# Patient Record
Sex: FEMALE | Race: WHITE | Employment: OTHER | URBAN - METROPOLITAN AREA
[De-identification: names, ages, dates, MRNs, and addresses within clinical notes are randomized per-mention and may not be internally consistent; named-entity substitution may affect disease eponyms.]

---

## 2017-03-02 ENCOUNTER — OFFICE VISIT (OUTPATIENT)
Dept: CARDIOLOGY | Facility: CLINIC | Age: 82
End: 2017-03-02
Attending: INTERNAL MEDICINE
Payer: COMMERCIAL

## 2017-03-02 VITALS
OXYGEN SATURATION: 96 % | WEIGHT: 230.69 LBS | SYSTOLIC BLOOD PRESSURE: 167 MMHG | DIASTOLIC BLOOD PRESSURE: 74 MMHG | HEART RATE: 68 BPM | BODY MASS INDEX: 40.86 KG/M2

## 2017-03-02 DIAGNOSIS — I25.10 CORONARY ARTERY DISEASE INVOLVING NATIVE CORONARY ARTERY OF NATIVE HEART WITHOUT ANGINA PECTORIS: ICD-10-CM

## 2017-03-02 DIAGNOSIS — R07.89 ATYPICAL CHEST PAIN: ICD-10-CM

## 2017-03-02 PROCEDURE — 99213 OFFICE O/P EST LOW 20 MIN: CPT | Performed by: INTERNAL MEDICINE

## 2017-03-02 ASSESSMENT — PAIN SCALES - GENERAL: PAINLEVEL: NO PAIN (0)

## 2017-03-02 NOTE — PROGRESS NOTES
HPI and Plan:   See dictation    Orders Placed This Encounter   Procedures     Follow-Up with Cardiologist       No orders of the defined types were placed in this encounter.      There are no discontinued medications.      Encounter Diagnoses   Name Primary?     Coronary artery disease involving native coronary artery of native heart without angina pectoris      Atypical chest pain        CURRENT MEDICATIONS:  Current Outpatient Prescriptions   Medication Sig Dispense Refill     allopurinol (ZYLOPRIM) 100 MG tablet Take 1 tablet (100 mg) by mouth daily 90 tablet 3     levothyroxine (SYNTHROID/LEVOTHROID) 75 MCG tablet Take 1 tablet (75 mcg) by mouth daily 90 tablet 2     GLUCOSAMINE SULFATE PO        loperamide (IMODIUM A-D) 2 MG tablet Start with 2 tabs (4 mg), then take one tab (2 mg) after each diarrheal stool.  Do not use more than  8 tabs (16 mg) per day. 30 tablet 0     tiZANidine (ZANAFLEX) 2 MG tablet Take 1 tablet (2 mg) by mouth nightly as needed for muscle spasms 30 tablet 0     HYDROcodone-acetaminophen (NORCO) 5-325 MG per tablet Take 1 tablet by mouth 2 times daily as needed for moderate to severe pain Max of 2 a day 30 tablet 0     nystatin (MYCOSTATIN) 361955 UNIT/GM POWD Apply to area where you get a yeast infection daily 60 g 1     psyllium 0.52 G capsule Take 1 capsule (0.52 g) by mouth At Bedtime 540 capsule      acetaminophen (TYLENOL) 325 MG tablet 650 BID and BID  tablet      clotrimazole-betamethasone (LOTRISONE) cream Apply topically 2 times daily 15 g 1     atorvastatin (LIPITOR) 20 MG tablet Take 1 tablet (20 mg) by mouth daily 90 tablet 1     ALOE PO Take 2 capsules by mouth daily        isosorbide mononitrate (IMDUR) 30 MG 24 hr tablet Take 1 tablet (30 mg) by mouth daily 45 tablet 3     Probiotic Product (PROBIOTIC DAILY) CAPS Take 1 capsule by mouth daily 100 capsule 4     multivitamin, therapeutic with minerals (MULTI-VITAMIN) TABS Take 1 tablet by mouth daily 100 tablet 3      metoprolol (TOPROL-XL) 25 MG 24 hr tablet Take 1 tablet (25 mg) by mouth daily 90 tablet 3     aspirin EC 81 MG EC tablet Take 1 tablet (81 mg) by mouth daily       nitroglycerin (NITROSTAT) 0.4 MG SL tablet Place 1 tablet (0.4 mg) under the tongue every 5 minutes as needed for chest pain 60 tablet 1       ALLERGIES     Allergies   Allergen Reactions     Sulfa Drugs Other (See Comments)     Causes burning when urinates. Causes yeast infections.       PAST MEDICAL HISTORY:  Past Medical History   Diagnosis Date     ACS (acute coronary syndrome) (H) 4/14/2014     Anemia 12/6/2012     Arthritis      Chronic kidney disease, stage III (moderate) 10/2/2012     CKD (chronic kidney disease) stage 3, GFR 30-59 ml/min 3/30/2011     COPD (chronic obstructive pulmonary disease) (H)      Generalised anxiety disorder 9/8/2013     GERD 5/17/2005     HX OF COMPOUND HEMANGIOMA NOS [228.00]      HX OF SUPERFICIAL PHLEBITIS      Hyperlipidemia LDL goal <100 10/31/2010     Hypertension      Hypertension goal BP (blood pressure) < 140/90 5/17/2005     Hypothyroidism 9/12/2012     Obesity, unspecified 1/8/2007     Phlebitis and thrombophlebitis of superficial vessels of lower extremities 10/2/2012     Primary localized osteoarthrosis, lower leg 12/26/2012     S/P knee replacement left 1/2/2013     Thyroid disease      Unspecified hypothyroidism 10/2/2012       PAST SURGICAL HISTORY:  Past Surgical History   Procedure Laterality Date     Hysterectomy, hong  1976     Total abdominal hysterectomy & bilateral salpingectomy oophorectomy     Colonoscopy  2002     Cataract iol, rt/lt  4/2010     bilateral     Arthroscopy knee irrigation and debridement  12/10/2010     ARTHROSCOPY KNEE IRRIGATION AND DEBRIDEMENT performed by LEY, JEFFREY DUANE at [facility]     Arthroplasty knee  4/13/2011     Procedure:ARTHROPLASTY KNEE; Surgeon:TANYA GUZMAN; Location:[facility]     Tonsillectomy & adenoidectomy       as child     Arthroplasty shoulder  9/26/2012      Procedure: ARTHROPLASTY SHOULDER;  Right total shoulder arthroplasty;  Surgeon: Sen Parry MD;  Location: [facility]     Arthroplasty knee  12/26/2012     Procedure: ARTHROPLASTY KNEE;  Left Total Knee Arthroplasty;  Surgeon: Sen Parry MD;  Location: [facility]     Coronary angiography adult order       Colonoscopy  6/19/2014     Procedure: COLONOSCOPY;  Surgeon: Steve Deng MD;  Location: [facility]     Arthroplasty shoulder Left 5/4/2016     Procedure: ARTHROPLASTY SHOULDER;  Surgeon: Sen Parry MD;  Location: [facility]       FAMILY HISTORY:  Family History   Problem Relation Age of Onset     Alcohol/Drug Father      DIABETES Brother      DIABETES Brother      Arthritis Mother 20     Other - See Comments Daughter      Fallopian tube infection 06/2015       SOCIAL HISTORY:  Social History     Social History     Marital status: Single     Spouse name: N/A     Number of children: N/A     Years of education: N/A     Social History Main Topics     Smoking status: Former Smoker     Quit date: 1/1/1970     Smokeless tobacco: Never Used      Comment: 20 year hx of smoking 3 packs of cigarettes daily; quit 1970     Alcohol use No     Drug use: No     Sexual activity: Not Currently     Other Topics Concern     Parent/Sibling W/ Cabg, Mi Or Angioplasty Before 65f 55m? Yes     Social History Narrative       Review of Systems:  Skin:  Positive for scaling     Eyes:  Positive for glasses;visual blurring    ENT:  Positive for sinus trouble    Respiratory:  Negative       Cardiovascular:    Positive for;edema;fatigue    Gastroenterology: Negative      Genitourinary:  Negative      Musculoskeletal:  Positive for joint pain    Neurologic:  Positive for headaches;numbness or tingling of feet    Psychiatric:  Negative      Heme/Lymph/Imm:  Negative      Endocrine:  Negative        Physical Exam:  Vitals: /74 (BP Location: Right arm, Patient Position: Chair, Cuff Size: Adult Regular)  Pulse 68  Wt 104.6 kg (230 lb 11  oz)  LMP 01/14/2013  SpO2 96%  BMI 40.86 kg/m2    Constitutional:  cooperative;alert and oriented;in no acute distress        Skin:  warm and dry to the touch        Head:  normocephalic        Eyes:  sclera white        ENT:  no pallor or cyanosis        Neck:  no stiffness        Chest:  clear to auscultation          Cardiac: regular rhythm;normal S1 and S2                  Abdomen:  abdomen soft        Vascular: pulses full and equal                                        Extremities and Back:  no edema              Neurological:  affect appropriate              CC  Les Biggs MD   [facility]  [address]  [place] [id number]

## 2017-03-02 NOTE — MR AVS SNAPSHOT
After Visit Summary   3/2/2017    Elly Choe    MRN: [id number]           Patient Information     Date Of Birth          1935        Visit Information        Provider Department      3/2/2017 3:30 PM Les Biggs MD [facility]        Today's Diagnoses     Coronary artery disease involving native coronary artery of native heart without angina pectoris        Atypical chest pain           Follow-ups after your visit        Additional Services     Follow-Up with Cardiologist                 Your next 10 appointments already scheduled     Mar 02, 2017  3:30 PM CST   Return Visit with Les Biggs MD   [facility] ([facility])    [address] [address]   [phone]              Future tests that were ordered for you today     Open Future Orders        Priority Expected Expires Ordered    Follow-Up with Cardiologist Routine 3/2/2018 7/15/2018 3/2/2017            Who to contact     If you have questions or need follow up information about today's clinic visit or your schedule please contact [facility] directly at [phone].  Normal or non-critical lab and imaging results will be communicated to you by MyChart, letter or phone within 4 business days after the clinic has received the results. If you do not hear from us within 7 days, please contact the clinic through [employer] or phone. If you have a critical or abnormal lab result, we will notify you by phone as soon as possible.  Submit refill requests through [employer] or call your pharmacy and they will forward the refill request to us. Please allow 3 business days for your refill to be completed.          Additional Information About Your Visit        Breadcrumbtrackinghart Information     [employer] lets you send messages to your doctor, view your test results, renew your  "prescriptions, schedule appointments and more. To sign up, go to [email or web address] . Click on \"Log in\" on the left side of the screen, which will take you to the Welcome page. Then click on \"Sign up Now\" on the right side of the page.     You will be asked to enter the access code listed below, as well as some personal information. Please follow the directions to create your username and password.     Your access code is: [email or web address]  Expires: 2017 11:58 AM     Your access code will  in 90 days. If you need help or a new code, please call your [facility] clinic or [phone].        Care EveryWhere ID     This is your Care EveryWhere ID. This could be used by other organizations to access your [facility] medical records  [id number]        Your Vitals Were     Pulse Last Period Pulse Oximetry BMI (Body Mass Index)          68 2013 96% 40.86 kg/m2         Blood Pressure from Last 3 Encounters:   17 167/74   16 107/68   10/07/16 160/72    Weight from Last 3 Encounters:   17 104.6 kg (230 lb 11 oz)   16 104.8 kg (231 lb)   10/07/16 105 kg (231 lb 8 oz)              We Performed the Following     Follow-Up with Cardiologist        Primary Care Provider Office Phone # Fax #    Malina WatersDO [phone]       [address] [address]        Thank you!     Thank you for choosing [facility]  for your care. Our goal is always to provide you with excellent care. Hearing back from our patients is one way we can continue to improve our services. Please take a few minutes to complete the written survey that you may receive in the mail after your visit with us. Thank you!             Your Updated Medication List - Protect others around you: Learn how to safely use, store and throw away your medicines at www.disposemymeds.org.          This list is accurate as of: 3/2/17 11:58 " AM.  Always use your most recent med list.                   Brand Name Dispense Instructions for use    acetaminophen 325 MG tablet    TYLENOL    100 tablet    650 BID and BID PRN       allopurinol 100 MG tablet    ZYLOPRIM    90 tablet    Take 1 tablet (100 mg) by mouth daily       ALOE PO      Take 2 capsules by mouth daily       aspirin 81 MG EC tablet      Take 1 tablet (81 mg) by mouth daily       atorvastatin 20 MG tablet    LIPITOR    90 tablet    Take 1 tablet (20 mg) by mouth daily       clotrimazole-betamethasone cream    LOTRISONE    15 g    Apply topically 2 times daily       GLUCOSAMINE SULFATE PO          HYDROcodone-acetaminophen 5-325 MG per tablet    NORCO    30 tablet    Take 1 tablet by mouth 2 times daily as needed for moderate to severe pain Max of 2 a day       isosorbide mononitrate 30 MG 24 hr tablet    IMDUR    45 tablet    Take 1 tablet (30 mg) by mouth daily       levothyroxine 75 MCG tablet    SYNTHROID/LEVOTHROID    90 tablet    Take 1 tablet (75 mcg) by mouth daily       loperamide 2 MG tablet    IMODIUM A-D    30 tablet    Start with 2 tabs (4 mg), then take one tab (2 mg) after each diarrheal stool.  Do not use more than  8 tabs (16 mg) per day.       metoprolol 25 MG 24 hr tablet    TOPROL-XL    90 tablet    Take 1 tablet (25 mg) by mouth daily       multivitamin, therapeutic with minerals Tabs tablet     100 tablet    Take 1 tablet by mouth daily       nitroglycerin 0.4 MG sublingual tablet    NITROSTAT    60 tablet    Place 1 tablet (0.4 mg) under the tongue every 5 minutes as needed for chest pain       nystatin 356715 UNIT/GM Powd    MYCOSTATIN    60 g    Apply to area where you get a yeast infection daily       PROBIOTIC DAILY Caps     100 capsule    Take 1 capsule by mouth daily       psyllium 0.52 G capsule     540 capsule    Take 1 capsule (0.52 g) by mouth At Bedtime       tiZANidine 2 MG tablet    ZANAFLEX    30 tablet    Take 1 tablet (2 mg) by mouth nightly as needed for  muscle spasms

## 2017-03-02 NOTE — LETTER
3/2/2017    Malina Waters, DO  [facility]   [address] [facility] [address] [address]    RE: Elly Salazarzelda       Dear Colleague,    I had the pleasure of seeing Elly Choe in the [facility].    Ms. Choe is a pleasant 81-year-old female with a history of PCI to the left main in 04/2014 who returns in annual followup.      A year ago she was undergoing orthopedic preoperative risk assessment for her shoulder surgery, and I had the patient undergo a nuclear stress test.  This was completed on 02/10/2016 and showed no evidence for myocardial ischemia.  Gating showed an ejection fraction of 75%.      The patient has been feeling well from a cardiovascular standpoint, denying any chest pain, pressure, shortness of breath, orthopnea or paroxysmal nocturnal dyspnea.      In reviewing her chart, her most recent lipids were from 2015 and show an LDL of 78.  She has continued to take atorvastatin 20 mg daily for secondary prevention.      Please see my separate note with her full physical examination.     Outpatient Encounter Prescriptions as of 3/2/2017   Medication Sig Dispense Refill     allopurinol (ZYLOPRIM) 100 MG tablet Take 1 tablet (100 mg) by mouth daily 90 tablet 3     levothyroxine (SYNTHROID/LEVOTHROID) 75 MCG tablet Take 1 tablet (75 mcg) by mouth daily 90 tablet 2     GLUCOSAMINE SULFATE PO Take 1 tablet by mouth 2 times daily        loperamide (IMODIUM A-D) 2 MG tablet Start with 2 tabs (4 mg), then take one tab (2 mg) after each diarrheal stool.  Do not use more than  8 tabs (16 mg) per day. 30 tablet 0     [DISCONTINUED] tiZANidine (ZANAFLEX) 2 MG tablet Take 1 tablet (2 mg) by mouth nightly as needed for muscle spasms 30 tablet 0     nystatin (MYCOSTATIN) 853855 UNIT/GM POWD Apply to area where you get a yeast infection daily 60 g 1     [DISCONTINUED] HYDROcodone-acetaminophen (NORCO) 5-325 MG per tablet Take 1 tablet by mouth 2 times  daily as needed for moderate to severe pain Max of 2 a day 30 tablet 0     psyllium 0.52 G capsule Take 1 capsule (0.52 g) by mouth At Bedtime (Patient taking differently: Take 1 capsule by mouth 2 times daily ) 540 capsule      acetaminophen (TYLENOL) 325 MG tablet 650 BID and BID  tablet      clotrimazole-betamethasone (LOTRISONE) cream Apply topically 2 times daily 15 g 1     atorvastatin (LIPITOR) 20 MG tablet Take 1 tablet (20 mg) by mouth daily 90 tablet 1     ALOE PO Take 2 capsules by mouth daily        isosorbide mononitrate (IMDUR) 30 MG 24 hr tablet Take 1 tablet (30 mg) by mouth daily 45 tablet 3     Probiotic Product (PROBIOTIC DAILY) CAPS Take 1 capsule by mouth daily 100 capsule 4     multivitamin, therapeutic with minerals (MULTI-VITAMIN) TABS Take 1 tablet by mouth daily 100 tablet 3     metoprolol (TOPROL-XL) 25 MG 24 hr tablet Take 1 tablet (25 mg) by mouth daily 90 tablet 3     aspirin EC 81 MG EC tablet Take 1 tablet (81 mg) by mouth daily       nitroglycerin (NITROSTAT) 0.4 MG SL tablet Place 1 tablet (0.4 mg) under the tongue every 5 minutes as needed for chest pain 60 tablet 1     No facility-administered encounter medications on file as of 3/2/2017.       IMPRESSION AND PLAN:  Ms. Choe is a pleasant 81-year-old female with stable coronary artery disease with a prior history of left main PCI in 2014.  At this time, she is not manifesting any anginal symptoms and had a stress test that was normal a year ago.  I will continue her aspirin 81 mg a day, metoprolol succinate 25 mg daily, isosorbide mononitrate 30 mg daily, and atorvastatin unchanged for secondary prevention.  I have asked Ms. Choe to return to see me in routine followup in 1 year.     Again, thank you for allowing me to participate in the care of your patient.      Sincerely,    Les Biggs MD

## 2017-03-03 NOTE — PROGRESS NOTES
HISTORY OF PRESENT ILLNESS:  Ms. Choe is a pleasant 81-year-old female with a history of PCI to the left main in 2014 who returns in annual followup.      A year ago she was undergoing orthopedic preoperative risk assessment for her shoulder surgery, and I had the patient undergo a nuclear stress test.  This was completed on 02/10/2016 and showed no evidence for myocardial ischemia.  Gating showed an ejection fraction of 75%.      The patient has been feeling well from a cardiovascular standpoint, denying any chest pain, pressure, shortness of breath, orthopnea or paroxysmal nocturnal dyspnea.      In reviewing her chart, her most recent lipids were from  and show an LDL of 78.  She has continued to take atorvastatin 20 mg daily for secondary prevention.      Please see my separate note with her full physical examination.      IMPRESSION AND PLAN:  Ms. Choe is a pleasant 81-year-old female with stable coronary artery disease with a prior history of left main PCI in .  At this time, she is not manifesting any anginal symptoms and had a stress test that was normal a year ago.  I will continue her aspirin 81 mg a day, metoprolol succinate 25 mg daily, isosorbide mononitrate 30 mg daily, and atorvastatin unchanged for secondary prevention.  I have asked Ms. Choe to return to see me in routine followup in 1 year.         KAUR HAQUE MD             D: 2017 12:24   T: 2017 21:55   MT: MARU      Name:     TIFFANY CHOE   MRN:      [id number]        Account:      [id number]   :      1935           Service Date: 2017      Document: [id number]

## 2017-04-04 ENCOUNTER — APPOINTMENT (OUTPATIENT)
Dept: CT IMAGING | Facility: CLINIC | Age: 82
End: 2017-04-04
Attending: FAMILY MEDICINE
Payer: COMMERCIAL

## 2017-04-04 ENCOUNTER — HOSPITAL ENCOUNTER (EMERGENCY)
Facility: CLINIC | Age: 82
Discharge: HOME | End: 2017-04-04
Attending: FAMILY MEDICINE | Admitting: FAMILY MEDICINE
Payer: COMMERCIAL

## 2017-04-04 ENCOUNTER — TELEPHONE (OUTPATIENT)
Dept: FAMILY MEDICINE | Facility: CLINIC | Age: 82
End: 2017-04-04

## 2017-04-04 VITALS
RESPIRATION RATE: 14 BRPM | SYSTOLIC BLOOD PRESSURE: 128 MMHG | HEART RATE: 79 BPM | TEMPERATURE: 98 F | DIASTOLIC BLOOD PRESSURE: 87 MMHG | OXYGEN SATURATION: 94 %

## 2017-04-04 DIAGNOSIS — H81.399 PERIPHERAL VERTIGO, UNSPECIFIED LATERALITY: ICD-10-CM

## 2017-04-04 LAB
ALBUMIN UR-MCNC: NEGATIVE MG/DL
ANION GAP SERPL CALCULATED.3IONS-SCNC: 8 MMOL/L (ref 3–14)
APPEARANCE UR: CLEAR
BACTERIA #/AREA URNS HPF: ABNORMAL /HPF
BASOPHILS # BLD AUTO: 0.1 10E9/L (ref 0–0.2)
BASOPHILS NFR BLD AUTO: 1.3 %
BILIRUB UR QL STRIP: NEGATIVE
BUN SERPL-MCNC: 17 MG/DL (ref 7–30)
CALCIUM SERPL-MCNC: 8.7 MG/DL (ref 8.5–10.1)
CHLORIDE SERPL-SCNC: 106 MMOL/L (ref 94–109)
CO2 SERPL-SCNC: 27 MMOL/L (ref 20–32)
COLOR UR AUTO: YELLOW
CREAT SERPL-MCNC: 1.53 MG/DL (ref 0.52–1.04)
DIFFERENTIAL METHOD BLD: NORMAL
EOSINOPHIL # BLD AUTO: 0.1 10E9/L (ref 0–0.7)
EOSINOPHIL NFR BLD AUTO: 2.5 %
ERYTHROCYTE [DISTWIDTH] IN BLOOD BY AUTOMATED COUNT: 13.3 % (ref 10–15)
GFR SERPL CREATININE-BSD FRML MDRD: 33 ML/MIN/1.7M2
GLUCOSE SERPL-MCNC: 97 MG/DL (ref 70–99)
GLUCOSE UR STRIP-MCNC: NEGATIVE MG/DL
HCT VFR BLD AUTO: 39.9 % (ref 35–47)
HGB BLD-MCNC: 13.5 G/DL (ref 11.7–15.7)
HGB UR QL STRIP: NEGATIVE
IMM GRANULOCYTES # BLD: 0 10E9/L (ref 0–0.4)
IMM GRANULOCYTES NFR BLD: 0.2 %
KETONES UR STRIP-MCNC: NEGATIVE MG/DL
LEUKOCYTE ESTERASE UR QL STRIP: ABNORMAL
LYMPHOCYTES # BLD AUTO: 1.6 10E9/L (ref 0.8–5.3)
LYMPHOCYTES NFR BLD AUTO: 31.1 %
MCH RBC QN AUTO: 32.8 PG (ref 26.5–33)
MCHC RBC AUTO-ENTMCNC: 33.8 G/DL (ref 31.5–36.5)
MCV RBC AUTO: 97 FL (ref 78–100)
MONOCYTES # BLD AUTO: 0.4 10E9/L (ref 0–1.3)
MONOCYTES NFR BLD AUTO: 8 %
MUCOUS THREADS #/AREA URNS LPF: PRESENT /LPF
NEUTROPHILS # BLD AUTO: 3 10E9/L (ref 1.6–8.3)
NEUTROPHILS NFR BLD AUTO: 56.9 %
NITRATE UR QL: NEGATIVE
PH UR STRIP: 5 PH (ref 5–7)
PLATELET # BLD AUTO: 174 10E9/L (ref 150–450)
POTASSIUM SERPL-SCNC: 4.1 MMOL/L (ref 3.4–5.3)
RBC # BLD AUTO: 4.11 10E12/L (ref 3.8–5.2)
RBC #/AREA URNS AUTO: 5 /HPF (ref 0–2)
SODIUM SERPL-SCNC: 141 MMOL/L (ref 133–144)
SP GR UR STRIP: 1.01 (ref 1–1.03)
SQUAMOUS #/AREA URNS AUTO: 2 /HPF (ref 0–1)
TROPONIN I SERPL-MCNC: NORMAL UG/L (ref 0–0.04)
URN SPEC COLLECT METH UR: ABNORMAL
UROBILINOGEN UR STRIP-MCNC: NORMAL MG/DL (ref 0–2)
WBC # BLD AUTO: 5.3 10E9/L (ref 4–11)
WBC #/AREA URNS AUTO: 27 /HPF (ref 0–2)

## 2017-04-04 PROCEDURE — 80048 BASIC METABOLIC PNL TOTAL CA: CPT | Performed by: EMERGENCY MEDICINE

## 2017-04-04 PROCEDURE — 87086 URINE CULTURE/COLONY COUNT: CPT | Performed by: FAMILY MEDICINE

## 2017-04-04 PROCEDURE — 84484 ASSAY OF TROPONIN QUANT: CPT | Performed by: EMERGENCY MEDICINE

## 2017-04-04 PROCEDURE — 70450 CT HEAD/BRAIN W/O DYE: CPT

## 2017-04-04 PROCEDURE — 99285 EMERGENCY DEPT VISIT HI MDM: CPT | Mod: 25

## 2017-04-04 PROCEDURE — 81001 URINALYSIS AUTO W/SCOPE: CPT | Performed by: EMERGENCY MEDICINE

## 2017-04-04 PROCEDURE — 85025 COMPLETE CBC W/AUTO DIFF WBC: CPT | Performed by: EMERGENCY MEDICINE

## 2017-04-04 PROCEDURE — 70498 CT ANGIOGRAPHY NECK: CPT

## 2017-04-04 PROCEDURE — 99284 EMERGENCY DEPT VISIT MOD MDM: CPT | Performed by: FAMILY MEDICINE

## 2017-04-04 PROCEDURE — 25500064 ZZH RX 255 OP 636: Performed by: FAMILY MEDICINE

## 2017-04-04 PROCEDURE — 25000125 ZZHC RX 250: Performed by: FAMILY MEDICINE

## 2017-04-04 RX ORDER — MECLIZINE HYDROCHLORIDE 25 MG/1
25 TABLET ORAL 3 TIMES DAILY PRN
Qty: 30 TABLET | Refills: 0 | Status: SHIPPED | OUTPATIENT
Start: 2017-04-04 | End: 2017-06-04

## 2017-04-04 RX ORDER — ONDANSETRON 4 MG/1
4-8 TABLET, ORALLY DISINTEGRATING ORAL EVERY 6 HOURS PRN
Qty: 10 TABLET | Refills: 0 | Status: SHIPPED | OUTPATIENT
Start: 2017-04-04 | End: 2017-06-01

## 2017-04-04 RX ORDER — IOPAMIDOL 755 MG/ML
70 INJECTION, SOLUTION INTRAVASCULAR ONCE
Status: DISCONTINUED | OUTPATIENT
Start: 2017-04-04 | End: 2017-04-04

## 2017-04-04 RX ADMIN — SODIUM CHLORIDE 100 ML: 9 INJECTION, SOLUTION INTRAVENOUS at 18:36

## 2017-04-04 RX ADMIN — IOPAMIDOL 70 ML: 755 INJECTION, SOLUTION INTRAVENOUS at 18:36

## 2017-04-04 NOTE — ED AVS SNAPSHOT
[facility] Emergency Department    [address] [facility] [address]    Phone:  [phone]    Fax:  [phone]                                       Elly Choe   MRN: [id number]    Department:  [facility] Emergency Department   Date of Visit:  4/4/2017           Patient Information     Date Of Birth          1935        Your diagnoses for this visit were:     Peripheral vertigo, unspecified laterality        You were seen by Kulwinder Cowart MD.        Discharge Instructions       Return to the Emergency Room if the following occurs:     Worsened vertigo/vomiting/dehydration, new severe headache, or for any concern at anytime.    Or, follow-up with the following provider as we discussed:     Return to your primary doctor if not improved over the next 5-7 days.    Medications discussed:    Zofran for nausea, as needed.  Meclizine for vertigo, as needed.    If you received pain-relieving or sedating medication during your time in the ER, avoid alcohol, driving automobiles, or working with machinery.  Also, a responsible adult must stay with you.      If you had X-rays or labs done we will attempt to contact you if there is a change needed in your care.      Call the Nurse Advice Line at [phone] or [phone] for any concern at anytime.      24 Hour Appointment Hotline       To make an appointment at any [facility] clinic, call [phone] ([phone]). If you don't have a family doctor or clinic, we will help you find one. [facility] clinics are conveniently located to serve the needs of you and your family.             Review of your medicines      START taking        Dose / Directions Last dose taken    meclizine 25 MG tablet   Commonly known as:  ANTIVERT   Dose:  25 mg   Quantity:  30 tablet        Take 1 tablet (25 mg) by mouth 3 times daily as needed for dizziness   Refills:  0        ondansetron 4 MG ODT tab   Commonly known as:  ZOFRAN ODT   Dose:  4-8 mg    Quantity:  10 tablet        Take 1-2 tablets (4-8 mg) by mouth every 6 hours as needed for nausea   Refills:  0          Our records show that you are taking the medicines listed below. If these are incorrect, please call your family doctor or clinic.        Dose / Directions Last dose taken    acetaminophen 325 MG tablet   Commonly known as:  TYLENOL   Quantity:  100 tablet        650 BID and BID PRN   Refills:  0        allopurinol 100 MG tablet   Commonly known as:  ZYLOPRIM   Dose:  100 mg   Quantity:  90 tablet        Take 1 tablet (100 mg) by mouth daily   Refills:  3        ALOE PO   Dose:  2 capsule        Take 2 capsules by mouth daily   Refills:  0        aspirin 81 MG EC tablet   Dose:  81 mg        Take 1 tablet (81 mg) by mouth daily   Refills:  0        atorvastatin 20 MG tablet   Commonly known as:  LIPITOR   Dose:  20 mg   Quantity:  90 tablet        Take 1 tablet (20 mg) by mouth daily   Refills:  1        clotrimazole-betamethasone cream   Commonly known as:  LOTRISONE   Quantity:  15 g        Apply topically 2 times daily   Refills:  1        GLUCOSAMINE SULFATE PO   Dose:  1 tablet        Take 1 tablet by mouth 2 times daily   Refills:  0        isosorbide mononitrate 30 MG 24 hr tablet   Commonly known as:  IMDUR   Dose:  30 mg   Quantity:  45 tablet        Take 1 tablet (30 mg) by mouth daily   Refills:  3        levothyroxine 75 MCG tablet   Commonly known as:  SYNTHROID/LEVOTHROID   Dose:  75 mcg   Quantity:  90 tablet        Take 1 tablet (75 mcg) by mouth daily   Refills:  2        loperamide 2 MG tablet   Commonly known as:  IMODIUM A-D   Quantity:  30 tablet        Start with 2 tabs (4 mg), then take one tab (2 mg) after each diarrheal stool.  Do not use more than  8 tabs (16 mg) per day.   Refills:  0        metoprolol 25 MG 24 hr tablet   Commonly known as:  TOPROL-XL   Dose:  25 mg   Quantity:  90 tablet        Take 1 tablet (25 mg) by mouth daily   Refills:  3        multivitamin,  therapeutic with minerals Tabs tablet   Dose:  1 tablet   Quantity:  100 tablet        Take 1 tablet by mouth daily   Refills:  3        nitroglycerin 0.4 MG sublingual tablet   Commonly known as:  NITROSTAT   Dose:  0.4 mg   Quantity:  60 tablet        Place 1 tablet (0.4 mg) under the tongue every 5 minutes as needed for chest pain   Refills:  1        nystatin [id number] UNIT/GM Powd   Commonly known as:  MYCOSTATIN   Quantity:  60 g        Apply to area where you get a yeast infection daily   Refills:  1        PROBIOTIC DAILY Caps   Dose:  1 capsule   Quantity:  100 capsule        Take 1 capsule by mouth daily   Refills:  4        psyllium 0.52 G capsule   Dose:  1 capsule   Quantity:  540 capsule        Take 1 capsule (0.52 g) by mouth At Bedtime   Refills:  0                Prescriptions were sent or printed at these locations (2 Prescriptions)                   Other Prescriptions                Printed at Department/Unit printer (2 of 2)         ondansetron (ZOFRAN ODT) 4 MG ODT tab               meclizine (ANTIVERT) 25 MG tablet                Procedures and tests performed during your visit     Basic metabolic panel    CBC with platelets differential    CT Head Neck Angio w/o & w Contrast    CT Head w/o Contrast    Troponin I    UA reflex to Microscopic      Orders Needing Specimen Collection     None      Pending Results     Date and Time Order Name Status Description    4/4/2017 1814 CT Head Neck Angio w/o & w Contrast Preliminary             Pending Culture Results     No orders found from 4/2/2017 to 4/5/2017.            Test Results From Your Hospital Stay        4/4/2017  5:52 PM      Component Results     Component Value Ref Range & Units Status    WBC 5.3 4.0 - 11.0 10e9/L Final    RBC Count 4.11 3.8 - 5.2 10e12/L Final    Hemoglobin 13.5 11.7 - 15.7 g/dL Final    Hematocrit 39.9 35.0 - 47.0 % Final    MCV 97 78 - 100 fl Final    MCH 32.8 26.5 - 33.0 pg Final    MCHC 33.8 31.5 - 36.5 g/dL Final    RDW  13.3 10.0 - 15.0 % Final    Platelet Count 174 150 - 450 10e9/L Final    Diff Method Automated Method  Final    % Neutrophils 56.9 % Final    % Lymphocytes 31.1 % Final    % Monocytes 8.0 % Final    % Eosinophils 2.5 % Final    % Basophils 1.3 % Final    % Immature Granulocytes 0.2 % Final    Absolute Neutrophil 3.0 1.6 - 8.3 10e9/L Final    Absolute Lymphocytes 1.6 0.8 - 5.3 10e9/L Final    Absolute Monocytes 0.4 0.0 - 1.3 10e9/L Final    Absolute Eosinophils 0.1 0.0 - 0.7 10e9/L Final    Absolute Basophils 0.1 0.0 - 0.2 10e9/L Final    Abs Immature Granulocytes 0.0 0 - 0.4 10e9/L Final         4/4/2017  6:24 PM      Component Results     Component Value Ref Range & Units Status    Sodium 141 133 - 144 mmol/L Final    Potassium 4.1 3.4 - 5.3 mmol/L Final    Chloride 106 94 - 109 mmol/L Final    Carbon Dioxide 27 20 - 32 mmol/L Final    Anion Gap 8 3 - 14 mmol/L Final    Glucose 97 70 - 99 mg/dL Final    Urea Nitrogen 17 7 - 30 mg/dL Final    Creatinine 1.53 (H) 0.52 - 1.04 mg/dL Final    GFR Estimate 33 (L) >60 mL/min/1.7m2 Final    Non  GFR Calc    GFR Estimate If Black 39 (L) >60 mL/min/1.7m2 Final    African American GFR Calc    Calcium 8.7 8.5 - 10.1 mg/dL Final         4/4/2017  6:24 PM      Component Results     Component Value Ref Range & Units Status    Troponin I ES  0.000 - 0.045 ug/L Final    <0.015  The 99th percentile for upper reference range is 0.045 ug/L.  Troponin values in   the range of 0.045 - 0.120 ug/L may be associated with risks of adverse   clinical events.           4/4/2017  6:06 PM      Component Results     Component Value Ref Range & Units Status    Color Urine Yellow  Final    Appearance Urine Clear  Final    Glucose Urine Negative NEG mg/dL Final    Bilirubin Urine Negative NEG Final    Ketones Urine Negative NEG mg/dL Final    Specific Gravity Urine 1.012 1.003 - 1.035 Final    Blood Urine Negative NEG Final    pH Urine 5.0 5.0 - 7.0 pH Final    Protein Albumin Urine  Negative NEG mg/dL Final    Urobilinogen mg/dL Normal 0.0 - 2.0 mg/dL Final    Nitrite Urine Negative NEG Final    Leukocyte Esterase Urine Large (A) NEG Final    Source Midstream Urine  Final    RBC Urine 5 (H) 0 - 2 /HPF Final    WBC Urine 27 (H) 0 - 2 /HPF Final    Bacteria Urine Few (A) NEG /HPF Final    Squamous Epithelial /HPF Urine 2 (H) 0 - 1 /HPF Final    Mucous Urine Present (A) NEG /LPF Final         4/4/2017  6:47 PM      Narrative     CT SCAN OF THE HEAD WITHOUT CONTRAST   4/4/2017 6:43 PM     HISTORY: Dizziness.    TECHNIQUE:  Axial images of the head and coronal reformations without  IV contrast material. Radiation dose for this scan was reduced using  automated exposure control, adjustment of the mA and/or kV according  to patient size, or iterative reconstruction technique.    COMPARISON: None.    FINDINGS:  There is generalized atrophy of the brain.  There is low  attenuation in the white matter of the cerebral hemispheres consistent  with sequelae of small vessel ischemic disease. There is no evidence  of intracranial hemorrhage, mass, acute infarct or anomaly.     The visualized portions of the sinuses and mastoids appear normal.  There is no evidence of trauma.        Impression     IMPRESSION:   1. No acute abnormality.  2.  Atrophy of the brain.  White matter changes consistent with  sequelae of small vessel ischemic disease.      SIMONE FERNANDEZ MD         4/4/2017  7:02 PM      Narrative     CT ANGIOGRAM OF THE HEAD AND NECK WITHOUT AND WITH CONTRAST  4/4/2017  6:47 PM     HISTORY: Episode of dizziness occurred yesterday and has since  resolved.    TECHNIQUE: Precontrast localizing scans were followed by CT  angiography with an injection of 70 mL Isovue-370 IV with scans  through the head and neck.  Images were transferred to a separate 3-D  workstation where multiplanar reformations and 3-D images were  created.  Estimates of carotid stenoses are made relative to the  distal internal carotid  "artery diameters except as noted. Radiation  dose for this scan was reduced using automated exposure control,  adjustment of the mA and/or kV according to patient size, or iterative  reconstruction technique.    COMPARISON: None.    CT HEAD FINDINGS: No contrast enhancing lesions.  Cerebral blood flow  is grossly normal.    CT ANGIOGRAM HEAD FINDINGS: Arteries are widely patent with no  aneurysm, significant stenosis, occlusion or intraarterial thrombus.  Venous circulation is unremarkable. Mild calcified atherosclerotic  plaque in the carotid siphons.    CT ANGIOGRAM NECK FINDINGS:   Right carotid artery: Atherosclerotic plaque with calcification in the  distal common carotid and proximal internal and external carotid  arteries. Residual luminal diameter in the right internal carotid  artery is 1.6 mm compared with distal internal carotid diameter of 5.2  mm indicating 69% diameter stenosis by NASCET criteria.      Left carotid artery: Calcified and noncalcified atherosclerotic plaque  in the carotid bifurcation. No significant stenosis.      Vertebral arteries: No significant stenosis.      Other findings: Thyroid atrophy.        Impression     IMPRESSION:   1. No evidence of arterial occlusion.  2. There is 69% diameter stenosis of the proximal right internal  carotid artery by NASCET criteria.                  Thank you for choosing [facility]       Thank you for choosing [facility] for your care. Our goal is always to provide you with excellent care. Hearing back from our patients is one way we can continue to improve our services. Please take a few minutes to complete the written survey that you may receive in the mail after you visit with us. Thank you!        [employer] Information     [employer] lets you send messages to your doctor, view your test results, renew your prescriptions, schedule appointments and more. To sign up, go to [email or web address] . Click on \"Log in\" on the left side of the screen, which " "will take you to the Welcome page. Then click on \"Sign up Now\" on the right side of the page.     You will be asked to enter the access code listed below, as well as some personal information. Please follow the directions to create your username and password.     Your access code is: [email or web address]  Expires: 2017 12:58 PM     Your access code will  in 90 days. If you need help or a new code, please call your [facility] clinic or [phone].        Care EveryWhere ID     This is your Care EveryWhere ID. This could be used by other organizations to access your [facility] medical records  [id number]        After Visit Summary       This is your record. Keep this with you and show to your community pharmacist(s) and doctor(s) at your next visit.                  "

## 2017-04-04 NOTE — TELEPHONE ENCOUNTER
Reason for Call:  Other stroke     Detailed comments: pt is calling in with possible stroke yesterday: trouble finding words, trouble walking, woozy, dizzy    Lasted 2-4 hours, no face drooping or mouth drooping        Phone Number Patient can be reached at: Home number on file [phone] (home)    Best Time: Sent to RN     Can we leave a detailed message on this number? Not Applicable    Call taken on 4/4/2017 at 3:16 PM by Michelle Schafer

## 2017-04-04 NOTE — TELEPHONE ENCOUNTER
Patient reports that she had a 4 hour episode yesterday where she felt dizzy, had difficulty walking.  She would try to move her right foot and her left would move.  Patient denies difficulty speaking.  She did get nauseated and throw up.  Today she feels hot and over heated, which she states is abnormal for her.  She usually feels cold.  Also more tired than normal today.  Advised patient to be evaluated in the ER.  Patient will have a friend drive her.    Alisha Jimenez RN

## 2017-04-04 NOTE — ED PROVIDER NOTES
"  HPI  Patient is an 81-year-old female presenting with dizziness.  She has a known history of coronary artery disease with two coronary stents placed in 2014.  She has rheumatoid arthritis.  She has COPD.  She has chronic kidney disease, stage III.  She has morbid obesity.  She quit smoking in 1970.  No drugs.  No alcohol.    The patient was well until yesterday afternoon.  She got up from sitting and became very dizzy.  She describes her dizziness as spinning or vertigo.  She became nauseous.  When she sat back down her symptoms improved.  When she lay flat her symptoms resolved.  When she got back up her symptoms recur.  This occurred over a couple of hours yesterday but then resolved.  Today, she feels \"off\" but there is been no recurrent vertigo.  No headache.  No other neurological deficits described.  No ear fullness or changes in hearing.  No recent nasal congestion.  No sinus pressure or green nasal discharge.  No fever.  No meningismus described.  No trauma or injury.  No urinary symptoms described.    ROS: All other review of systems are negative other than that noted above.   PMH: Reviewed.  SH: Reviewed.  FH: Reviewed.      PHYSICAL  /87  Pulse 79  Temp 98  F (36.7  C)  Resp 16  LMP 01/14/2013  SpO2 94%  General: Patient is alert and in no distress.  Talkative.  MO.  Neurological: Alert.  No dysarthria or dysphasia.  CN II-VIII intact grossly.  Moving U/L extremities B.  Strength 5/5 U/L extremities B.  Sensation intact grossly to touch (equal).  Rapid alternating movements intact.  Negative Rhomberg.  Normal finger-to-nose movments.  Head / Neck: Atraumatic.  Ears: Not done.  Eyes: Pupils are equal, round, and reactive.  Normal conjunctiva.  Nose: Midline.  No epistaxis.  Mouth / Throat: No ulcerations or lesions.  Upper pharynx is not erythematous.  Moist.  Respiratory: No respiratory distress. CTA B.  Cardiovascular: Regular rhythm.  Peripheral extremities are warm.  No edema.  No calf " tenderness.  Abdomen / Pelvis: Not tender.  No distention.  Soft throughout.  Genitalia: Not done.  Musculoskeletal: No tenderness over major muscles and joints.  Skin: No evidence of rash or trauma.        PHYSICIAN  1816.  The patient has vertigo.  There is likely a peripheral source.  That said, she is 81 years old and has known vascular disease.  Lab values pending.  Urinalysis does show findings concerning for a urinary tract infection.  She does not have typical symptoms.  Culture pending.  CT of her head ordered.  CT of her head and neck ordered.    Labs Ordered and Resulted from Time of ED Arrival Up to the Time of Departure from the ED   BASIC METABOLIC PANEL - Abnormal; Notable for the following:        Result Value    Creatinine 1.53 (*)     GFR Estimate 33 (*)     GFR Estimate If Black 39 (*)     All other components within normal limits   URINE MACROSCOPIC WITH REFLEX TO MICRO - Abnormal; Notable for the following:     Leukocyte Esterase Urine Large (*)     RBC Urine 5 (*)     WBC Urine 27 (*)     Bacteria Urine Few (*)     Squamous Epithelial /HPF Urine 2 (*)     Mucous Urine Present (*)     All other components within normal limits   CBC WITH PLATELETS DIFFERENTIAL   TROPONIN I   URINE CULTURE AEROBIC BACTERIAL       IMAGING  CT head without contrast.  CTA of the head and neck.  Images reviewed by me.  Radiology report was also reviewed.      1931.  CT images are unremarkable for bleed or obvious stroke.  No evidence of abscess or tumor.  There is moderate obstruction seen in the right internal carotid artery.  I did review this with the patient.  Further discussion can be had with her primary doctor and I reassured her that there was could've been no emergent intervention for this problem.  I do not think her current symptoms are related to this.  I will provide meclozine and Zofran to be used as needed only.  We talked about peripheral vertigo and potential causes.  She has been without symptoms  over the past 24 hours.  Hopefully, she will not have any recurrent dizziness.  Follow up discussed.      IMPRESSION    ICD-10-CM    1. Peripheral vertigo, unspecified laterality H81.399            Critical Care Time:  none   Trauma:      CMS Coding:  None         Kulwinder Cowart MD  04/04/17 1932

## 2017-04-04 NOTE — ED AVS SNAPSHOT
[facility] Emergency Department    [address] [facility] [address] [address]    Phone:  [phone]    Fax:  [phone]                                       Elly Choe   MRN: [id number]    Department:  [facility] Emergency Department   Date of Visit:  4/4/2017           After Visit Summary Signature Page     I have received my discharge instructions, and my questions have been answered. I have discussed any challenges I see with this plan with the nurse or doctor.    ..........................................................................................................................................  Patient/Patient Representative Signature      ..........................................................................................................................................  Patient Representative Print Name and Relationship to Patient    ..................................................               ................................................  Date                                            Time    ..........................................................................................................................................  Reviewed by Signature/Title    ...................................................              ..............................................  Date                                                            Time

## 2017-04-05 NOTE — DISCHARGE INSTRUCTIONS
Return to the Emergency Room if the following occurs:     Worsened vertigo/vomiting/dehydration, new severe headache, or for any concern at anytime.    Or, follow-up with the following provider as we discussed:     Return to your primary doctor if not improved over the next 5-7 days.    Medications discussed:    Zofran for nausea, as needed.  Meclizine for vertigo, as needed.    If you received pain-relieving or sedating medication during your time in the ER, avoid alcohol, driving automobiles, or working with machinery.  Also, a responsible adult must stay with you.      If you had X-rays or labs done we will attempt to contact you if there is a change needed in your care.      Call the Nurse Advice Line at [phone] or [phone] for any concern at anytime.

## 2017-04-06 LAB
BACTERIA SPEC CULT: NORMAL
MICRO REPORT STATUS: NORMAL
SPECIMEN SOURCE: NORMAL

## 2017-05-23 ENCOUNTER — OFFICE VISIT (OUTPATIENT)
Dept: FAMILY MEDICINE | Facility: CLINIC | Age: 82
End: 2017-05-23
Payer: COMMERCIAL

## 2017-05-23 VITALS
OXYGEN SATURATION: 96 % | SYSTOLIC BLOOD PRESSURE: 112 MMHG | WEIGHT: 226 LBS | BODY MASS INDEX: 40.03 KG/M2 | HEART RATE: 80 BPM | DIASTOLIC BLOOD PRESSURE: 60 MMHG | TEMPERATURE: 99.2 F

## 2017-05-23 DIAGNOSIS — B37.2 YEAST INFECTION OF THE SKIN: Primary | ICD-10-CM

## 2017-05-23 DIAGNOSIS — R30.0 DYSURIA: ICD-10-CM

## 2017-05-23 LAB
ALBUMIN UR-MCNC: NEGATIVE MG/DL
APPEARANCE UR: CLEAR
BILIRUB UR QL STRIP: NEGATIVE
COLOR UR AUTO: YELLOW
CREAT UR-MCNC: 135 MG/DL
GLUCOSE UR STRIP-MCNC: NEGATIVE MG/DL
HGB UR QL STRIP: ABNORMAL
KETONES UR STRIP-MCNC: NEGATIVE MG/DL
LEUKOCYTE ESTERASE UR QL STRIP: NEGATIVE
MICRO REPORT STATUS: NORMAL
MICROALBUMIN UR-MCNC: 11 MG/L
MICROALBUMIN/CREAT UR: 7.93 MG/G CR (ref 0–25)
NITRATE UR QL: NEGATIVE
NON-SQ EPI CELLS #/AREA URNS LPF: NORMAL /LPF
PH UR STRIP: 6 PH (ref 5–7)
RBC #/AREA URNS AUTO: NORMAL /HPF (ref 0–2)
SP GR UR STRIP: 1.01 (ref 1–1.03)
SPECIMEN SOURCE: NORMAL
URN SPEC COLLECT METH UR: ABNORMAL
UROBILINOGEN UR STRIP-ACNC: 0.2 EU/DL (ref 0.2–1)
WBC #/AREA URNS AUTO: NORMAL /HPF (ref 0–2)
WET PREP SPEC: NORMAL

## 2017-05-23 PROCEDURE — 87210 SMEAR WET MOUNT SALINE/INK: CPT | Performed by: NURSE PRACTITIONER

## 2017-05-23 PROCEDURE — 87086 URINE CULTURE/COLONY COUNT: CPT | Performed by: NURSE PRACTITIONER

## 2017-05-23 PROCEDURE — 81001 URINALYSIS AUTO W/SCOPE: CPT | Performed by: NURSE PRACTITIONER

## 2017-05-23 PROCEDURE — 82043 UR ALBUMIN QUANTITATIVE: CPT | Performed by: NURSE PRACTITIONER

## 2017-05-23 PROCEDURE — 99213 OFFICE O/P EST LOW 20 MIN: CPT | Performed by: NURSE PRACTITIONER

## 2017-05-23 RX ORDER — NYSTATIN 100000 [USP'U]/G
POWDER TOPICAL 3 TIMES DAILY PRN
Qty: 30 G | Refills: 1 | Status: SHIPPED | OUTPATIENT
Start: 2017-05-23 | End: 2017-06-04

## 2017-05-23 NOTE — MR AVS SNAPSHOT
"              After Visit Summary   5/23/2017    Elly Choe    MRN: [id number]           Patient Information     Date Of Birth          1935        Visit Information        Provider Department      5/23/2017 1:40 PM [facility] SAME DAY PROVIDER [facility]        Today's Diagnoses     Dysuria    -  1    Yeast infection of the skin          Care Instructions    Antibiotics as directed   Urine culture is pending, will contact you if there is a change in treatment therapy.   Drink plenty of fluids. Prevention and treatment of UTI's discussed.   Signs and symptoms of pyelonephritis mentioned.   RTC if any worsening symptoms or if not improving as expected.   Follow-up with your primary care provider next week and as needed.    Indications for emergent return to emergency department discussed with patient, who verbalized good understanding and agreement.  Patient understands the limitations of today's evaluation.         Dysuria  Dysuria is pain felt during urination. It is often described as a burning. Learn more about this problem and how it can be treated.     Painful urination (dysuria) is often caused by a problem in the urinary tract.   What causes dysuria?  Possible causes include:    Infection with a bacteria or virus. This can be a urinary tract infection (UTI). Or it may be a sexually transmitted infection (STI).    Sensitivity or allergy to chemicals. These chemicals are found in lotions and other products.    Prostate or bladder problems    Radiation therapy to the pelvic area  How is dysuria diagnosed?  Your health care provider will examine you. He or she will ask about your symptoms and health. After talking with you and doing a physical exam, your health care provider may know what is causing your dysuria. He or she will usually request  a sample of your urine. Tests of your urine, or a \"urinalysis,\" are done. A urinalysis may include:    Looking at the urine sample (visual " exam)    Checking for substances (chemical exam)    Checking a small amount under a microscope (microscopic exam)  Some parts of the urinalysis may be done in the provider's office and some in a lab. And, the urine sample may be checked for bacteria and yeast (urine culture). Your health care provider will tell you more about these tests if they are needed.  How is dysuria treated?  Treatment depends on the cause. If you have a bacterial infection, you may need antibiotics. You may be given medications to make it easier for you to urinate and help relieve pain. Your health care provider can tell you more about your treatment options. Untreated, symptoms may get worse.  Call the health care provider right away if you have any of the following:    Fever of 100.4 F (38 C) or higher     No improvement after three days of treatment    Trouble urinating because of pain    New or increased discharge from the vagina or penis    Rash or joint pain    Increased back or abdominal pain    Enlarged painful lymph nodes (lumps) in the groin     7123-9932 [employer]. [address] [place] [address]. All rights reserved. This information is not intended as a substitute for professional medical care. Always follow your healthcare professional's instructions.        Candida Skin Infection (Adult)  Candida is type of yeast. It grows naturally on the skin and in the mouth. If it grows out of control, it can cause an infection. Candida can cause infections in the genital area, skin folds, and under the breasts. Anyone can get this infection. It is more common in a person with a weakened immune system, such as from diabetes, HIV, or cancer. It s also more common in someone who has been on antibiotic therapy. And it s more common people who are overweight or who have incontinence. Wearing tight-fitting clothing and taking part in activities with lots of skin-to-skin contact can also put you at risk.  Candida causes  the skin to become bright red and inflamed. The border of the infected part of the skin is often raised. The infection causes pain and itching. Sometimes the skin peels and bleeds.  A Candida rash is most often treated with an antifungal cream or ointment. The rash will clear a few days after starting the medication. Infections that don t go away may need a prescription medication. In rare cases, a bacterial infection can also occur.  Home care  Your health care provider will recommend an antifungal cream or ointment for the rash. He or she may also prescribe a medication for the itch. Follow all instructions for using these medications. Don t use cornstarch powder. Cornstarch can cause the Candida infection to get worse.  General care:    Keep your skin clean by washing the area twice a day.    Use the cream as directed until your rash is gone. Once the skin has healed, keep it dry to prevent another infection.     If you are overweight, talk with your health care provider about a plan to lose excess weight.    Avoid clothes that fit tightly.  Follow-up care  Your rash will clear in 7 to 14 days. Follow up with your health care provider if the rash is not gone after 14 days.  When to seek medical advice  Call your health care provider right away if any of these occur:    Pain or redness that gets worse or spreads    Fluid coming from the skin    Yellow crusts on the skin    Fever of 100.4 F (38 C) or higher, or as directed by your health care provider       8373-0562 [employer]. [address] [place] [address], [place] [address]. All rights reserved. This information is not intended as a substitute for professional medical care. Always follow your healthcare professional's instructions.              Follow-ups after your visit        Who to contact     If you have questions or need follow up information about today's clinic visit or your schedule please contact [facility] directly at  "[phone].  Normal or non-critical lab and imaging results will be communicated to you by MyChart, letter or phone within 4 business days after the clinic has received the results. If you do not hear from us within 7 days, please contact the clinic through [email or web address] or phone. If you have a critical or abnormal lab result, we will notify you by phone as soon as possible.  Submit refill requests through [employer] or call your pharmacy and they will forward the refill request to us. Please allow 3 business days for your refill to be completed.          Additional Information About Your Visit        [email or web address] Information     [employer] lets you send messages to your doctor, view your test results, renew your prescriptions, schedule appointments and more. To sign up, go to [email or web address] . Click on \"Log in\" on the left side of the screen, which will take you to the Welcome page. Then click on \"Sign up Now\" on the right side of the page.     You will be asked to enter the access code listed below, as well as some personal information. Please follow the directions to create your username and password.     Your access code is: [email or web address]  Expires: 2017 12:58 PM     Your access code will  in 90 days. If you need help or a new code, please call your [facility] clinic or [phone].        Care EveryWhere ID     This is your Care EveryWhere ID. This could be used by other organizations to access your [facility] medical records  [id number]        Your Vitals Were     Pulse Temperature Last Period Pulse Oximetry BMI (Body Mass Index)       80 99.2  F (37.3  C) (Tympanic) 2013 96% 40.03 kg/m2        Blood Pressure from Last 3 Encounters:   17 112/60   17 128/87   17 167/74    Weight from Last 3 Encounters:   17 226 lb (102.5 kg)   17 230 lb 11 oz (104.6 kg)   16 231 lb (104.8 kg)              We Performed the Following     **Albumin Random Urine Quant FUTURE 3mo     *UA " reflex to Microscopic and Culture ([place] and [facility] (except [place] and [place])     Urine Microscopic     Wet prep          Today's Medication Changes          These changes are accurate as of: 5/23/17  2:12 PM.  If you have any questions, ask your nurse or doctor.               These medicines have changed or have updated prescriptions.        Dose/Directions    * nystatin 133129 UNIT/GM Powd   Commonly known as:  MYCOSTATIN   This may have changed:  Another medication with the same name was added. Make sure you understand how and when to take each.   Used for:  Tinea corporis   Changed by:  Anali Pierson MD        Apply to area where you get a yeast infection daily   Quantity:  60 g   Refills:  1       * nystatin 512235 UNIT/GM Powd   Commonly known as:  MYCOSTATIN   This may have changed:  You were already taking a medication with the same name, and this prescription was added. Make sure you understand how and when to take each.   Used for:  Yeast infection of the skin        Apply topically 3 times daily as needed   Quantity:  30 g   Refills:  1       psyllium 0.52 G capsule   This may have changed:  when to take this   Used for:  Slow transit constipation        Dose:  1 capsule   Take 1 capsule (0.52 g) by mouth At Bedtime   Quantity:  540 capsule   Refills:  0       * Notice:  This list has 2 medication(s) that are the same as other medications prescribed for you. Read the directions carefully, and ask your doctor or other care provider to review them with you.         Where to get your medicines      Some of these will need a paper prescription and others can be bought over the counter.  Ask your nurse if you have questions.     Bring a paper prescription for each of these medications     nystatin 390084 UNIT/GM Powd                Primary Care Provider Office Phone # Fax #    Malina Waters [phone]       [address]  [address]        Thank you!     Thank you for choosing [facility]  for your care. Our goal is always to provide you with excellent care. Hearing back from our patients is one way we can continue to improve our services. Please take a few minutes to complete the written survey that you may receive in the mail after your visit with us. Thank you!             Your Updated Medication List - Protect others around you: Learn how to safely use, store and throw away your medicines at www.disposemymeds.org.          This list is accurate as of: 5/23/17  2:12 PM.  Always use your most recent med list.                   Brand Name Dispense Instructions for use    acetaminophen 325 MG tablet    TYLENOL    100 tablet    650 BID and BID PRN       allopurinol 100 MG tablet    ZYLOPRIM    90 tablet    Take 1 tablet (100 mg) by mouth daily       ALOE PO      Take 2 capsules by mouth daily       aspirin 81 MG EC tablet      Take 1 tablet (81 mg) by mouth daily       atorvastatin 20 MG tablet    LIPITOR    90 tablet    Take 1 tablet (20 mg) by mouth daily       clotrimazole-betamethasone cream    LOTRISONE    15 g    Apply topically 2 times daily       GLUCOSAMINE SULFATE PO      Take 1 tablet by mouth 2 times daily       isosorbide mononitrate 30 MG 24 hr tablet    IMDUR    45 tablet    Take 1 tablet (30 mg) by mouth daily       levothyroxine 75 MCG tablet    SYNTHROID/LEVOTHROID    90 tablet    Take 1 tablet (75 mcg) by mouth daily       loperamide 2 MG tablet    IMODIUM A-D    30 tablet    Start with 2 tabs (4 mg), then take one tab (2 mg) after each diarrheal stool.  Do not use more than  8 tabs (16 mg) per day.       meclizine 25 MG tablet    ANTIVERT    30 tablet    Take 1 tablet (25 mg) by mouth 3 times daily as needed for dizziness       metoprolol 25 MG 24 hr tablet    TOPROL-XL    90 tablet    Take 1 tablet (25 mg) by mouth daily       multivitamin, therapeutic with minerals Tabs tablet     100 tablet    Take  1 tablet by mouth daily       nitroglycerin 0.4 MG sublingual tablet    NITROSTAT    60 tablet    Place 1 tablet (0.4 mg) under the tongue every 5 minutes as needed for chest pain       * nystatin 860091 UNIT/GM Powd    MYCOSTATIN    60 g    Apply to area where you get a yeast infection daily       * nystatin 462350 UNIT/GM Powd    MYCOSTATIN    30 g    Apply topically 3 times daily as needed       ondansetron 4 MG ODT tab    ZOFRAN ODT    10 tablet    Take 1-2 tablets (4-8 mg) by mouth every 6 hours as needed for nausea       PROBIOTIC DAILY Caps     100 capsule    Take 1 capsule by mouth daily       psyllium 0.52 G capsule     540 capsule    Take 1 capsule (0.52 g) by mouth At Bedtime       * Notice:  This list has 2 medication(s) that are the same as other medications prescribed for you. Read the directions carefully, and ask your doctor or other care provider to review them with you.

## 2017-05-23 NOTE — NURSING NOTE
"Chief Complaint   Patient presents with     UTI       Initial LMP 01/14/2013 Estimated body mass index is 40.86 kg/(m^2) as calculated from the following:    Height as of 11/23/16: 5' 3\" (1.6 m).    Weight as of 3/2/17: 230 lb 11 oz (104.6 kg).  Medication Reconciliation: complete    Health Maintenance that is potentially due pending provider review:  Microalbumin- done today, lipid screening- will do another time as she has to be fasting for this lab test, Fall risk assessment- done today           "

## 2017-05-23 NOTE — PATIENT INSTRUCTIONS
"Antibiotics as directed   Urine culture is pending, will contact you if there is a change in treatment therapy.   Drink plenty of fluids. Prevention and treatment of UTI's discussed.   Signs and symptoms of pyelonephritis mentioned.   RTC if any worsening symptoms or if not improving as expected.   Follow-up with your primary care provider next week and as needed.    Indications for emergent return to emergency department discussed with patient, who verbalized good understanding and agreement.  Patient understands the limitations of today's evaluation.         Dysuria  Dysuria is pain felt during urination. It is often described as a burning. Learn more about this problem and how it can be treated.     Painful urination (dysuria) is often caused by a problem in the urinary tract.   What causes dysuria?  Possible causes include:    Infection with a bacteria or virus. This can be a urinary tract infection (UTI). Or it may be a sexually transmitted infection (STI).    Sensitivity or allergy to chemicals. These chemicals are found in lotions and other products.    Prostate or bladder problems    Radiation therapy to the pelvic area  How is dysuria diagnosed?  Your health care provider will examine you. He or she will ask about your symptoms and health. After talking with you and doing a physical exam, your health care provider may know what is causing your dysuria. He or she will usually request  a sample of your urine. Tests of your urine, or a \"urinalysis,\" are done. A urinalysis may include:    Looking at the urine sample (visual exam)    Checking for substances (chemical exam)    Checking a small amount under a microscope (microscopic exam)  Some parts of the urinalysis may be done in the provider's office and some in a lab. And, the urine sample may be checked for bacteria and yeast (urine culture). Your health care provider will tell you more about these tests if they are needed.  How is dysuria " treated?  Treatment depends on the cause. If you have a bacterial infection, you may need antibiotics. You may be given medications to make it easier for you to urinate and help relieve pain. Your health care provider can tell you more about your treatment options. Untreated, symptoms may get worse.  Call the health care provider right away if you have any of the following:    Fever of 100.4 F (38 C) or higher     No improvement after three days of treatment    Trouble urinating because of pain    New or increased discharge from the vagina or penis    Rash or joint pain    Increased back or abdominal pain    Enlarged painful lymph nodes (lumps) in the groin     [id number] [employer]. [address] [place] [address]. All rights reserved. This information is not intended as a substitute for professional medical care. Always follow your healthcare professional's instructions.        Candida Skin Infection (Adult)  Candida is type of yeast. It grows naturally on the skin and in the mouth. If it grows out of control, it can cause an infection. Candida can cause infections in the genital area, skin folds, and under the breasts. Anyone can get this infection. It is more common in a person with a weakened immune system, such as from diabetes, HIV, or cancer. It s also more common in someone who has been on antibiotic therapy. And it s more common people who are overweight or who have incontinence. Wearing tight-fitting clothing and taking part in activities with lots of skin-to-skin contact can also put you at risk.  Candida causes the skin to become bright red and inflamed. The border of the infected part of the skin is often raised. The infection causes pain and itching. Sometimes the skin peels and bleeds.  A Candida rash is most often treated with an antifungal cream or ointment. The rash will clear a few days after starting the medication. Infections that don t go away may need a prescription  medication. In rare cases, a bacterial infection can also occur.  Home care  Your health care provider will recommend an antifungal cream or ointment for the rash. He or she may also prescribe a medication for the itch. Follow all instructions for using these medications. Don t use cornstarch powder. Cornstarch can cause the Candida infection to get worse.  General care:    Keep your skin clean by washing the area twice a day.    Use the cream as directed until your rash is gone. Once the skin has healed, keep it dry to prevent another infection.     If you are overweight, talk with your health care provider about a plan to lose excess weight.    Avoid clothes that fit tightly.  Follow-up care  Your rash will clear in 7 to 14 days. Follow up with your health care provider if the rash is not gone after 14 days.  When to seek medical advice  Call your health care provider right away if any of these occur:    Pain or redness that gets worse or spreads    Fluid coming from the skin    Yellow crusts on the skin    Fever of 100.4 F (38 C) or higher, or as directed by your health care provider       8898-0334 [employer]. [address] [place] [address], [place] [address]. All rights reserved. This information is not intended as a substitute for professional medical care. Always follow your healthcare professional's instructions.

## 2017-05-23 NOTE — PROGRESS NOTES
SUBJECTIVE:                                                    Elly Choe is a 81 year old female who presents to clinic today for the following health issues:      URINARY TRACT SYMPTOMS      Duration: over a week     Description  dysuria, frequency and urine was clear and yellow just now     Intensity:  mild, moderate    Accompanying signs and symptoms:  Fever/chills: no   Flank pain no   Nausea and vomiting: no   Vaginal symptoms: discharge and burning   Abdominal/Pelvic Pain: no     History  History of frequent UTI's: no   History of kidney stones: no   Sexually Active: no   Possibility of pregnancy: No    Precipitating or alleviating factors: None    Therapies tried and outcome: increase fluid intake           Past Medical History:   Diagnosis Date     ACS (acute coronary syndrome) (H) 4/14/2014     Anemia 12/6/2012     Arthritis      Chronic kidney disease, stage III (moderate) 10/2/2012     CKD (chronic kidney disease) stage 3, GFR 30-59 ml/min 3/30/2011     COPD (chronic obstructive pulmonary disease) (H)      Generalised anxiety disorder 9/8/2013     GERD 5/17/2005     HX OF COMPOUND HEMANGIOMA NOS [228.00]      HX OF SUPERFICIAL PHLEBITIS      Hyperlipidemia LDL goal <100 10/31/2010     Hypertension      Hypertension goal BP (blood pressure) < 140/90 5/17/2005     Hypothyroidism 9/12/2012     Obesity, unspecified 1/8/2007     Phlebitis and thrombophlebitis of superficial vessels of lower extremities 10/2/2012     Primary localized osteoarthrosis, lower leg 12/26/2012     S/P knee replacement left 1/2/2013     Thyroid disease      Unspecified hypothyroidism 10/2/2012     Current Outpatient Prescriptions   Medication Sig Dispense Refill     ondansetron (ZOFRAN ODT) 4 MG ODT tab Take 1-2 tablets (4-8 mg) by mouth every 6 hours as needed for nausea 10 tablet 0     meclizine (ANTIVERT) 25 MG tablet Take 1 tablet (25 mg) by mouth 3 times daily as needed for dizziness 30 tablet 0     allopurinol  (ZYLOPRIM) 100 MG tablet Take 1 tablet (100 mg) by mouth daily 90 tablet 3     levothyroxine (SYNTHROID/LEVOTHROID) 75 MCG tablet Take 1 tablet (75 mcg) by mouth daily 90 tablet 2     GLUCOSAMINE SULFATE PO Take 1 tablet by mouth 2 times daily        loperamide (IMODIUM A-D) 2 MG tablet Start with 2 tabs (4 mg), then take one tab (2 mg) after each diarrheal stool.  Do not use more than  8 tabs (16 mg) per day. 30 tablet 0     nystatin (MYCOSTATIN) 321128 UNIT/GM POWD Apply to area where you get a yeast infection daily 60 g 1     psyllium 0.52 G capsule Take 1 capsule (0.52 g) by mouth At Bedtime (Patient taking differently: Take 1 capsule by mouth 2 times daily ) 540 capsule      acetaminophen (TYLENOL) 325 MG tablet 650 BID and BID  tablet      clotrimazole-betamethasone (LOTRISONE) cream Apply topically 2 times daily 15 g 1     atorvastatin (LIPITOR) 20 MG tablet Take 1 tablet (20 mg) by mouth daily 90 tablet 1     ALOE PO Take 2 capsules by mouth daily        isosorbide mononitrate (IMDUR) 30 MG 24 hr tablet Take 1 tablet (30 mg) by mouth daily 45 tablet 3     Probiotic Product (PROBIOTIC DAILY) CAPS Take 1 capsule by mouth daily 100 capsule 4     multivitamin, therapeutic with minerals (MULTI-VITAMIN) TABS Take 1 tablet by mouth daily 100 tablet 3     metoprolol (TOPROL-XL) 25 MG 24 hr tablet Take 1 tablet (25 mg) by mouth daily 90 tablet 3     aspirin EC 81 MG EC tablet Take 1 tablet (81 mg) by mouth daily       nitroglycerin (NITROSTAT) 0.4 MG SL tablet Place 1 tablet (0.4 mg) under the tongue every 5 minutes as needed for chest pain 60 tablet 1       ROS:   CONSTITUTIONAL:NEGATIVE for fever, chills, change in weight  INTEGUMENTARY/SKIN: NEGATIVE for worrisome rashes, moles or lesions  EYES: NEGATIVE for vision changes or irritation  ENT/MOUTH: NEGATIVE for ear, mouth and throat problems  RESP:NEGATIVE for significant cough or SOB  CV: NEGATIVE for chest pain, palpitations or peripheral edema  GI:  NEGATIVE for nausea, abdominal pain, heartburn, or change in bowel habits  : See above   MUSCULOSKELETAL: NEGATIVE for significant arthralgias or myalgia  NEURO: NEGATIVE for weakness, dizziness or paresthesias    OBJECTIVE:  /60  Pulse 80  Temp 99.2  F (37.3  C) (Tympanic)  Wt 226 lb (102.5 kg)  LMP 01/14/2013  SpO2 96%  BMI 40.03 kg/m2  GENERAL APPEARANCE: healthy, alert and no distress  RESP: lungs clear to auscultation - no rales, rhonchi or wheezes  CV: regular rates and rhythm, normal S1 S2, no murmur noted  ABDOMEN:  soft, nontender, no HSM or masses and bowel sounds normal  :  Examination of the external vaginal area  rash reveals: Candida: flat, intensely inflamed, well-defined, clustered bright red macules  BACK: No CVA tenderness  SKIN: no suspicious lesions or rashes    UA:  Results for orders placed or performed in visit on 05/23/17   *UA reflex to Microscopic and Culture ([place] and [facility] (except [place] and [place])   Result Value Ref Range    Color Urine Yellow     Appearance Urine Clear     Glucose Urine Negative NEG mg/dL    Bilirubin Urine Negative NEG    Ketones Urine Negative NEG mg/dL    Specific Gravity Urine 1.015 1.003 - 1.035    Blood Urine Trace (A) NEG    pH Urine 6.0 5.0 - 7.0 pH    Protein Albumin Urine Negative NEG mg/dL    Urobilinogen Urine 0.2 0.2 - 1.0 EU/dL    Nitrite Urine Negative NEG    Leukocyte Esterase Urine Negative NEG    Source Midstream Urine    Urine Microscopic   Result Value Ref Range    WBC Urine O - 2 0 - 2 /HPF    RBC Urine O - 2 0 - 2 /HPF    Squamous Epithelial /LPF Urine Few FEW /LPF   Wet prep   Result Value Ref Range    Specimen Description Vagina     Wet Prep       No yeast seen  No clue cells seen  No Trichomonas seen      Micro Report Status FINAL 05/23/2017          ASSESSMENT:     ICD-10-CM    1. Yeast infection of the skin B37.2 nystatin (MYCOSTATIN) 695233 UNIT/GM POWD     Urine Culture Aerobic Bacterial   2. Dysuria R30.0  "*UA reflex to Microscopic and Culture ([place] and [facility] (except [place] and [place])     Urine Microscopic     **Albumin Random Urine Quant FUTURE 3mo     **Albumin Random Urine Quant FUTURE 3mo     Wet prep         PLAN:  Patient Instructions     Antibiotics as directed   Urine culture is pending, will contact you if there is a change in treatment therapy.   Drink plenty of fluids. Prevention and treatment of UTI's discussed.   Signs and symptoms of pyelonephritis mentioned.   RTC if any worsening symptoms or if not improving as expected.   Follow-up with your primary care provider next week and as needed.    Indications for emergent return to emergency department discussed with patient, who verbalized good understanding and agreement.  Patient understands the limitations of today's evaluation.         Dysuria  Dysuria is pain felt during urination. It is often described as a burning. Learn more about this problem and how it can be treated.     Painful urination (dysuria) is often caused by a problem in the urinary tract.   What causes dysuria?  Possible causes include:    Infection with a bacteria or virus. This can be a urinary tract infection (UTI). Or it may be a sexually transmitted infection (STI).    Sensitivity or allergy to chemicals. These chemicals are found in lotions and other products.    Prostate or bladder problems    Radiation therapy to the pelvic area  How is dysuria diagnosed?  Your health care provider will examine you. He or she will ask about your symptoms and health. After talking with you and doing a physical exam, your health care provider may know what is causing your dysuria. He or she will usually request  a sample of your urine. Tests of your urine, or a \"urinalysis,\" are done. A urinalysis may include:    Looking at the urine sample (visual exam)    Checking for substances (chemical exam)    Checking a small amount under a microscope (microscopic exam)  Some parts of the " urinalysis may be done in the provider's office and some in a lab. And, the urine sample may be checked for bacteria and yeast (urine culture). Your health care provider will tell you more about these tests if they are needed.  How is dysuria treated?  Treatment depends on the cause. If you have a bacterial infection, you may need antibiotics. You may be given medications to make it easier for you to urinate and help relieve pain. Your health care provider can tell you more about your treatment options. Untreated, symptoms may get worse.  Call the health care provider right away if you have any of the following:    Fever of 100.4 F (38 C) or higher     No improvement after three days of treatment    Trouble urinating because of pain    New or increased discharge from the vagina or penis    Rash or joint pain    Increased back or abdominal pain    Enlarged painful lymph nodes (lumps) in the groin     4253-1944 [employer]. [address] [place] [address]. All rights reserved. This information is not intended as a substitute for professional medical care. Always follow your healthcare professional's instructions.        Candida Skin Infection (Adult)  Candida is type of yeast. It grows naturally on the skin and in the mouth. If it grows out of control, it can cause an infection. Candida can cause infections in the genital area, skin folds, and under the breasts. Anyone can get this infection. It is more common in a person with a weakened immune system, such as from diabetes, HIV, or cancer. It s also more common in someone who has been on antibiotic therapy. And it s more common people who are overweight or who have incontinence. Wearing tight-fitting clothing and taking part in activities with lots of skin-to-skin contact can also put you at risk.  Candida causes the skin to become bright red and inflamed. The border of the infected part of the skin is often raised. The infection causes pain  and itching. Sometimes the skin peels and bleeds.  A Candida rash is most often treated with an antifungal cream or ointment. The rash will clear a few days after starting the medication. Infections that don t go away may need a prescription medication. In rare cases, a bacterial infection can also occur.  Home care  Your health care provider will recommend an antifungal cream or ointment for the rash. He or she may also prescribe a medication for the itch. Follow all instructions for using these medications. Don t use cornstarch powder. Cornstarch can cause the Candida infection to get worse.  General care:    Keep your skin clean by washing the area twice a day.    Use the cream as directed until your rash is gone. Once the skin has healed, keep it dry to prevent another infection.     If you are overweight, talk with your health care provider about a plan to lose excess weight.    Avoid clothes that fit tightly.  Follow-up care  Your rash will clear in 7 to 14 days. Follow up with your health care provider if the rash is not gone after 14 days.  When to seek medical advice  Call your health care provider right away if any of these occur:    Pain or redness that gets worse or spreads    Fluid coming from the skin    Yellow crusts on the skin    Fever of 100.4 F (38 C) or higher, or as directed by your health care provider       3758-4251 [employer]. [address] [place] [address], [place] [address]. All rights reserved. This information is not intended as a substitute for professional medical care. Always follow your healthcare professional's instructions.            ROSA Arceo CNP

## 2017-05-25 LAB
BACTERIA SPEC CULT: ABNORMAL
MICRO REPORT STATUS: ABNORMAL
SPECIMEN SOURCE: ABNORMAL

## 2017-06-01 ENCOUNTER — OFFICE VISIT (OUTPATIENT)
Dept: FAMILY MEDICINE | Facility: CLINIC | Age: 82
End: 2017-06-01
Payer: COMMERCIAL

## 2017-06-01 VITALS
WEIGHT: 223 LBS | BODY MASS INDEX: 39.51 KG/M2 | HEIGHT: 63 IN | DIASTOLIC BLOOD PRESSURE: 72 MMHG | TEMPERATURE: 97.8 F | SYSTOLIC BLOOD PRESSURE: 120 MMHG | HEART RATE: 80 BPM | OXYGEN SATURATION: 95 %

## 2017-06-01 DIAGNOSIS — I25.10 CORONARY ARTERY DISEASE INVOLVING NATIVE CORONARY ARTERY OF NATIVE HEART WITHOUT ANGINA PECTORIS: Chronic | ICD-10-CM

## 2017-06-01 DIAGNOSIS — E78.5 HYPERLIPIDEMIA LDL GOAL <100: Chronic | ICD-10-CM

## 2017-06-01 DIAGNOSIS — E03.9 ACQUIRED HYPOTHYROIDISM: Primary | Chronic | ICD-10-CM

## 2017-06-01 LAB
CHOLEST SERPL-MCNC: 196 MG/DL
HDLC SERPL-MCNC: 26 MG/DL
LDLC SERPL CALC-MCNC: 111 MG/DL
NONHDLC SERPL-MCNC: 170 MG/DL
TRIGL SERPL-MCNC: 294 MG/DL
TSH SERPL DL<=0.005 MIU/L-ACNC: 0.67 MU/L (ref 0.4–4)

## 2017-06-01 PROCEDURE — 80061 LIPID PANEL: CPT | Performed by: NURSE PRACTITIONER

## 2017-06-01 PROCEDURE — 99213 OFFICE O/P EST LOW 20 MIN: CPT | Performed by: NURSE PRACTITIONER

## 2017-06-01 PROCEDURE — 84443 ASSAY THYROID STIM HORMONE: CPT | Performed by: NURSE PRACTITIONER

## 2017-06-01 PROCEDURE — 36415 COLL VENOUS BLD VENIPUNCTURE: CPT | Performed by: NURSE PRACTITIONER

## 2017-06-01 NOTE — PROGRESS NOTES
SUBJECTIVE:                                                    Elly Choe is a 81 year old female who presents to clinic today for the following health issues:      Hypothyroidism Follow-up      Since last visit, patient describes the following symptoms: dry skin and mild depression       Amount of exercise or physical activity: as able     Problems taking medications regularly: No    Medication side effects: none    Diet: regular (no restrictions)    Things are not like they used to be-intermittent mood that are down.  Family in [place]-misses them.    Hyperlipidemia Follow-Up      Rate your low fat/cholesterol diet?: good    Taking statin?  Stopped states cholesterol has never been elevated and that this was maintenance mediation from cardiology.    Other lipid medications/supplements?:  none     Would like fasting lab draw today.       Problem list and histories reviewed & adjusted, as indicated.  Additional history: as documented    Patient Active Problem List   Diagnosis     Gastroesophageal reflux disease without esophagitis     Hemangioma     Benign essential hypertension     Diverticulitis of colon     surgical menopause (SARAH BSO) for non-cancerous reasons     Morbid obesity due to excess calories (H)     Idiopathic chronic gout of multiple sites without tophus     Chronic airway obstruction (H)     Acute dermatitis due to solar radiation     Hyperlipidemia LDL goal <100     CKD (chronic kidney disease) stage 3, GFR 30-59 ml/min     Advanced directives, counseling/discussion     Acquired hypothyroidism     DJD of shoulder     Phlebitis and thrombophlebitis of superficial vessels of lower extremities     Status post shoulder joint replacement     Anemia     Primary localized osteoarthrosis, lower leg     S/P knee replacement left     Generalized anxiety disorder     Health Care Home     Coronary artery disease involving native coronary artery of native heart without angina pectoris     S/P PTCA  (percutaneous transluminal coronary angioplasty)     FH: rheumatoid arthritis     Osteopenia     Status post total shoulder arthroplasty, left     Past Surgical History:   Procedure Laterality Date     ARTHROPLASTY KNEE  4/13/2011    Procedure:ARTHROPLASTY KNEE; Surgeon:SEN PARRY; Location:[facility]     ARTHROPLASTY KNEE  12/26/2012    Procedure: ARTHROPLASTY KNEE;  Left Total Knee Arthroplasty;  Surgeon: Sen Parry MD;  Location: [facility]     ARTHROPLASTY SHOULDER  9/26/2012    Procedure: ARTHROPLASTY SHOULDER;  Right total shoulder arthroplasty;  Surgeon: Sen Parry MD;  Location: [facility]     ARTHROPLASTY SHOULDER Left 5/4/2016    Procedure: ARTHROPLASTY SHOULDER;  Surgeon: Sen Parry MD;  Location: [facility]     ARTHROSCOPY KNEE IRRIGATION AND DEBRIDEMENT  12/10/2010    ARTHROSCOPY KNEE IRRIGATION AND DEBRIDEMENT performed by LEY, JEFFREY DUANE at [facility]     CATARACT IOL, RT/LT  4/2010    bilateral     COLONOSCOPY  2002     COLONOSCOPY  6/19/2014    Procedure: COLONOSCOPY;  Surgeon: Steve Deng MD;  Location: [facility]     CORONARY ANGIOGRAPHY ADULT ORDER       HYSTERECTOMY, SARAH  1976    Total abdominal hysterectomy & bilateral salpingectomy oophorectomy     TONSILLECTOMY & ADENOIDECTOMY      as child       Social History   Substance Use Topics     Smoking status: Former Smoker     Quit date: 1/1/1970     Smokeless tobacco: Never Used      Comment: 20 year hx of smoking 3 packs of cigarettes daily; quit 1970     Alcohol use No     Family History   Problem Relation Age of Onset     Alcohol/Drug Father      DIABETES Brother      DIABETES Brother      Arthritis Mother 20     Other - See Comments Daughter      Fallopian tube infection 06/2015         Current Outpatient Prescriptions   Medication Sig Dispense Refill     allopurinol (ZYLOPRIM) 100 MG tablet Take 1 tablet (100 mg) by mouth daily 90 tablet 3     levothyroxine (SYNTHROID/LEVOTHROID) 75 MCG tablet Take 1 tablet (75 mcg) by mouth daily 90  tablet 2     nystatin (MYCOSTATIN) 161772 UNIT/GM POWD Apply to area where you get a yeast infection daily 60 g 1     acetaminophen (TYLENOL) 325 MG tablet 650 BID and BID  tablet      nystatin (MYCOSTATIN) 561115 UNIT/GM POWD Apply topically 3 times daily as needed (Patient not taking: Reported on 6/1/2017) 30 g 1     meclizine (ANTIVERT) 25 MG tablet Take 1 tablet (25 mg) by mouth 3 times daily as needed for dizziness (Patient not taking: Reported on 6/1/2017) 30 tablet 0     GLUCOSAMINE SULFATE PO Take 1 tablet by mouth 2 times daily        loperamide (IMODIUM A-D) 2 MG tablet Start with 2 tabs (4 mg), then take one tab (2 mg) after each diarrheal stool.  Do not use more than  8 tabs (16 mg) per day. (Patient not taking: Reported on 6/1/2017) 30 tablet 0     psyllium 0.52 G capsule Take 1 capsule (0.52 g) by mouth At Bedtime (Patient not taking: Reported on 6/1/2017) 540 capsule      clotrimazole-betamethasone (LOTRISONE) cream Apply topically 2 times daily (Patient not taking: Reported on 6/1/2017) 15 g 1     atorvastatin (LIPITOR) 20 MG tablet Take 1 tablet (20 mg) by mouth daily (Patient not taking: Reported on 6/1/2017) 90 tablet 1     ALOE PO Take 2 capsules by mouth daily        isosorbide mononitrate (IMDUR) 30 MG 24 hr tablet Take 1 tablet (30 mg) by mouth daily (Patient not taking: Reported on 6/1/2017) 45 tablet 3     Probiotic Product (PROBIOTIC DAILY) CAPS Take 1 capsule by mouth daily (Patient not taking: Reported on 6/1/2017) 100 capsule 4     multivitamin, therapeutic with minerals (MULTI-VITAMIN) TABS Take 1 tablet by mouth daily (Patient not taking: Reported on 6/1/2017) 100 tablet 3     metoprolol (TOPROL-XL) 25 MG 24 hr tablet Take 1 tablet (25 mg) by mouth daily (Patient not taking: Reported on 6/1/2017) 90 tablet 3     aspirin EC 81 MG EC tablet Take 1 tablet (81 mg) by mouth daily (Patient not taking: Reported on 6/1/2017)       nitroglycerin (NITROSTAT) 0.4 MG SL tablet Place 1  "tablet (0.4 mg) under the tongue every 5 minutes as needed for chest pain (Patient not taking: Reported on 6/1/2017) 60 tablet 1     Allergies   Allergen Reactions     Sulfa Drugs Other (See Comments)     Causes burning when urinates. Causes yeast infections.     Percocet [Oxycodone-Acetaminophen] Other (See Comments)     Freaked out and broke a toilet at [facility]     Labs reviewed in EPIC    Reviewed and updated as needed this visit by clinical staff  Tobacco  Allergies  Meds  Med Hx  Surg Hx  Fam Hx  Soc Hx      Reviewed and updated as needed this visit by Provider         ROS:  Constitutional, HEENT, cardiovascular, pulmonary, gi and gu systems are negative, except as otherwise noted.    OBJECTIVE:                                                    /72 (BP Location: Other (Comment), Cuff Size: Adult Small)  Pulse 80  Temp 97.8  F (36.6  C) (Tympanic)  Ht 5' 3\" (1.6 m)  Wt 223 lb (101.2 kg)  LMP 01/14/2013  BMI 39.5 kg/m2  Body mass index is 39.5 kg/(m^2).  GENERAL: healthy, alert and no distress  NECK: no adenopathy, no asymmetry, masses, or scars and thyroid normal to palpation  RESP: lungs clear to auscultation - no rales, rhonchi or wheezes  CV: regular rate and rhythm, normal S1 S2, no S3 or S4, no murmur, click or rub  ABDOMEN: soft, nontender, no hepatosplenomegaly, no masses and bowel sounds normal  PSYCH: mentation appears normal, affect normal/bright    Diagnostic Test Results:  Results for orders placed or performed in visit on 06/01/17   TSH with free T4 reflex   Result Value Ref Range    TSH 0.67 0.40 - 4.00 mU/L   Lipid Profile (Chol, Trig, HDL, LDL calc)   Result Value Ref Range    Cholesterol 196 <200 mg/dL    Triglycerides 294 (H) <150 mg/dL    HDL Cholesterol 26 (L) >49 mg/dL    LDL Cholesterol Calculated 111 (H) <100 mg/dL    Non HDL Cholesterol 170 (H) <130 mg/dL        ASSESSMENT/PLAN:                                                      1. Acquired " hypothyroidism  Controlled.  Continue on current dose of medication.  - TSH with free T4 reflex  - levothyroxine (SYNTHROID/LEVOTHROID) 75 MCG tablet; Take 1 tablet (75 mcg) by mouth daily  Dispense: 90 tablet; Refill: 3    2. Hyperlipidemia LDL goal <100  Triglycerides and LDL elevated.  Not taking statin and not interested in restarting medication.  Working on diet and increasing physical activity.  - Lipid Profile (Chol, Trig, HDL, LDL calc)    3. Coronary artery disease involving native coronary artery of native heart without angina pectoris  Stopped statin and blood pressure medications a long time ago stating that her blood pressure and cholesterol have never been elevated and that they were maintenance mediations from cardiology. She is not interested in being on these medications.    Home care instructions were reviewed with the patient. The risks, benefits and treatment options of prescribed medications or other treatments have been discussed with the patient. The patient verbalized their understanding and should call or follow up if no improvement or if they develop further problems.      ROSA Patel [facility]

## 2017-06-01 NOTE — MR AVS SNAPSHOT
"              After Visit Summary   2017    Elly Choe    MRN: [id number]           Patient Information     Date Of Birth          1935        Visit Information        Provider Department      2017 9:20 AM Joy Kam APRN CNP [facility]        Today's Diagnoses     Acquired hypothyroidism    -  1    Hyperlipidemia LDL goal <100        Other specified hypothyroidism          Care Instructions    Labs today-we will notify you with those results          Follow-ups after your visit        Who to contact     If you have questions or need follow up information about today's clinic visit or your schedule please contact [facility] directly at [phone].  Normal or non-critical lab and imaging results will be communicated to you by MyChart, letter or phone within 4 business days after the clinic has received the results. If you do not hear from us within 7 days, please contact the clinic through MyChart or phone. If you have a critical or abnormal lab result, we will notify you by phone as soon as possible.  Submit refill requests through Novalact or call your pharmacy and they will forward the refill request to us. Please allow 3 business days for your refill to be completed.          Additional Information About Your Visit        MyChart Information     Novalact lets you send messages to your doctor, view your test results, renew your prescriptions, schedule appointments and more. To sign up, go to [email or web address] . Click on \"Log in\" on the left side of the screen, which will take you to the Welcome page. Then click on \"Sign up Now\" on the right side of the page.     You will be asked to enter the access code listed below, as well as some personal information. Please follow the directions to create your username and password.     Your access code is: [email or web address]  Expires: 2017  9:50 AM     Your access code will  in 90 days. If " "you need help or a new code, please call your [place] clinic or [phone].        Care EveryWhere ID     This is your Care EveryWhere ID. This could be used by other organizations to access your [place] medical records  [id number]        Your Vitals Were     Pulse Temperature Height Last Period BMI (Body Mass Index)       80 97.8  F (36.6  C) (Tympanic) 5' 3\" (1.6 m) 01/14/2013 39.5 kg/m2        Blood Pressure from Last 3 Encounters:   06/01/17 120/72   05/23/17 112/60   04/04/17 128/87    Weight from Last 3 Encounters:   06/01/17 223 lb (101.2 kg)   05/23/17 226 lb (102.5 kg)   03/02/17 230 lb 11 oz (104.6 kg)              We Performed the Following     Lipid Profile (Chol, Trig, HDL, LDL calc)     TSH with free T4 reflex          Today's Medication Changes          These changes are accurate as of: 6/1/17  9:50 AM.  If you have any questions, ask your nurse or doctor.               Stop taking these medicines if you haven't already. Please contact your care team if you have questions.     ondansetron 4 MG ODT tab   Commonly known as:  ZOFRAN ODT   Stopped by:  Joy Kam APRN CNP                    Primary Care Provider Office Phone # Fax #    Malina Waters [phone]       [address] [address]        Thank you!     Thank you for choosing [facility]  for your care. Our goal is always to provide you with excellent care. Hearing back from our patients is one way we can continue to improve our services. Please take a few minutes to complete the written survey that you may receive in the mail after your visit with us. Thank you!             Your Updated Medication List - Protect others around you: Learn how to safely use, store and throw away your medicines at www.disposemymeds.org.          This list is accurate as of: 6/1/17  9:50 AM.  Always use your most recent med list.                   Brand Name Dispense " Instructions for use    acetaminophen 325 MG tablet    TYLENOL    100 tablet    650 BID and BID PRN       allopurinol 100 MG tablet    ZYLOPRIM    90 tablet    Take 1 tablet (100 mg) by mouth daily       ALOE PO      Take 2 capsules by mouth daily       aspirin 81 MG EC tablet      Take 1 tablet (81 mg) by mouth daily       atorvastatin 20 MG tablet    LIPITOR    90 tablet    Take 1 tablet (20 mg) by mouth daily       clotrimazole-betamethasone cream    LOTRISONE    15 g    Apply topically 2 times daily       GLUCOSAMINE SULFATE PO      Take 1 tablet by mouth 2 times daily       isosorbide mononitrate 30 MG 24 hr tablet    IMDUR    45 tablet    Take 1 tablet (30 mg) by mouth daily       levothyroxine 75 MCG tablet    SYNTHROID/LEVOTHROID    90 tablet    Take 1 tablet (75 mcg) by mouth daily       loperamide 2 MG tablet    IMODIUM A-D    30 tablet    Start with 2 tabs (4 mg), then take one tab (2 mg) after each diarrheal stool.  Do not use more than  8 tabs (16 mg) per day.       meclizine 25 MG tablet    ANTIVERT    30 tablet    Take 1 tablet (25 mg) by mouth 3 times daily as needed for dizziness       metoprolol 25 MG 24 hr tablet    TOPROL-XL    90 tablet    Take 1 tablet (25 mg) by mouth daily       multivitamin, therapeutic with minerals Tabs tablet     100 tablet    Take 1 tablet by mouth daily       nitroglycerin 0.4 MG sublingual tablet    NITROSTAT    60 tablet    Place 1 tablet (0.4 mg) under the tongue every 5 minutes as needed for chest pain       * nystatin 622428 UNIT/GM Powd    MYCOSTATIN    60 g    Apply to area where you get a yeast infection daily       * nystatin 088427 UNIT/GM Powd    MYCOSTATIN    30 g    Apply topically 3 times daily as needed       PROBIOTIC DAILY Caps     100 capsule    Take 1 capsule by mouth daily       psyllium 0.52 G capsule     540 capsule    Take 1 capsule (0.52 g) by mouth At Bedtime       * Notice:  This list has 2 medication(s) that are the same as other medications  prescribed for you. Read the directions carefully, and ask your doctor or other care provider to review them with you.

## 2017-06-01 NOTE — NURSING NOTE
"Chief Complaint   Patient presents with     Urinary Problem       Initial /72 (BP Location: Other (Comment), Cuff Size: Adult Small)  Pulse 80  Temp 97.8  F (36.6  C) (Tympanic)  Ht 5' 3\" (1.6 m)  Wt 223 lb (101.2 kg)  LMP 01/14/2013  BMI 39.5 kg/m2 Estimated body mass index is 39.5 kg/(m^2) as calculated from the following:    Height as of this encounter: 5' 3\" (1.6 m).    Weight as of this encounter: 223 lb (101.2 kg).  Medication Reconciliation: complete    Health Maintenance that is potentially due pending provider review:  NONE    n/a        "

## 2017-06-05 RX ORDER — LEVOTHYROXINE SODIUM 75 UG/1
75 TABLET ORAL DAILY
Qty: 90 TABLET | Refills: 3 | Status: SHIPPED | OUTPATIENT
Start: 2017-06-05 | End: 2018-06-15

## 2017-06-13 ENCOUNTER — RADIANT APPOINTMENT (OUTPATIENT)
Dept: GENERAL RADIOLOGY | Facility: CLINIC | Age: 82
End: 2017-06-13
Attending: NURSE PRACTITIONER
Payer: COMMERCIAL

## 2017-06-13 ENCOUNTER — OFFICE VISIT (OUTPATIENT)
Dept: FAMILY MEDICINE | Facility: CLINIC | Age: 82
End: 2017-06-13
Payer: COMMERCIAL

## 2017-06-13 VITALS
TEMPERATURE: 97.9 F | OXYGEN SATURATION: 93 % | BODY MASS INDEX: 39.51 KG/M2 | DIASTOLIC BLOOD PRESSURE: 84 MMHG | WEIGHT: 223 LBS | HEART RATE: 64 BPM | HEIGHT: 63 IN | SYSTOLIC BLOOD PRESSURE: 132 MMHG

## 2017-06-13 DIAGNOSIS — M16.11 PRIMARY OSTEOARTHRITIS OF RIGHT HIP: Primary | ICD-10-CM

## 2017-06-13 DIAGNOSIS — M53.3 SACRAL BACK PAIN: ICD-10-CM

## 2017-06-13 DIAGNOSIS — M25.551 RIGHT HIP PAIN: ICD-10-CM

## 2017-06-13 PROCEDURE — 99213 OFFICE O/P EST LOW 20 MIN: CPT | Performed by: NURSE PRACTITIONER

## 2017-06-13 PROCEDURE — 73502 X-RAY EXAM HIP UNI 2-3 VIEWS: CPT

## 2017-06-13 PROCEDURE — 72220 X-RAY EXAM SACRUM TAILBONE: CPT

## 2017-06-13 NOTE — PROGRESS NOTES
SUBJECTIVE:                                                    Elly Choe is a 81 year old female who presents to clinic today for the following health issues:      Joint Pain     Onset: 1 mo     Description:   Location: right hip and tailbone   Character: Sharp    Intensity: moderate    Progression of Symptoms: worse    Accompanying Signs & Symptoms:  Other symptoms: none   History:   Previous similar pain: no       Precipitating factors:   Trauma or overuse: no     Alleviating factors:  Improved by: acetaminophen       Therapies Tried and outcome: tylenol - helped a little     Hip pain worse when getting from standing to sitting  Sacral pain when sitting, unrelated to hip    Problem list and histories reviewed & adjusted, as indicated.  Additional history: as documented    Patient Active Problem List   Diagnosis     Gastroesophageal reflux disease without esophagitis     Hemangioma     Benign essential hypertension     Diverticulitis of colon     surgical menopause (SARAH BSO) for non-cancerous reasons     Morbid obesity due to excess calories (H)     Idiopathic chronic gout of multiple sites without tophus     Chronic airway obstruction (H)     Acute dermatitis due to solar radiation     Hyperlipidemia LDL goal <100     CKD (chronic kidney disease) stage 3, GFR 30-59 ml/min     Advanced directives, counseling/discussion     Acquired hypothyroidism     DJD of shoulder     Phlebitis and thrombophlebitis of superficial vessels of lower extremities     Status post shoulder joint replacement     Anemia     Primary localized osteoarthrosis, lower leg     S/P knee replacement left     Generalized anxiety disorder     Health Care Home     Coronary artery disease involving native coronary artery of native heart without angina pectoris     S/P PTCA (percutaneous transluminal coronary angioplasty)     FH: rheumatoid arthritis     Osteopenia     Status post total shoulder arthroplasty, left     Past Surgical  History:   Procedure Laterality Date     ARTHROPLASTY KNEE  4/13/2011    Procedure:ARTHROPLASTY KNEE; Surgeon:SEN PARRY; Location:[facility]     ARTHROPLASTY KNEE  12/26/2012    Procedure: ARTHROPLASTY KNEE;  Left Total Knee Arthroplasty;  Surgeon: Sen Parry MD;  Location: [facility]     ARTHROPLASTY SHOULDER  9/26/2012    Procedure: ARTHROPLASTY SHOULDER;  Right total shoulder arthroplasty;  Surgeon: Sen Parry MD;  Location: [facility]     ARTHROPLASTY SHOULDER Left 5/4/2016    Procedure: ARTHROPLASTY SHOULDER;  Surgeon: Sen Parry MD;  Location: [facility]     ARTHROSCOPY KNEE IRRIGATION AND DEBRIDEMENT  12/10/2010    ARTHROSCOPY KNEE IRRIGATION AND DEBRIDEMENT performed by LEY, JEFFREY DUANE at [facility] OR     CATARACT IOL, RT/LT  4/2010    bilateral     COLONOSCOPY  2002     COLONOSCOPY  6/19/2014    Procedure: COLONOSCOPY;  Surgeon: Steve Deng MD;  Location: [facility]     CORONARY ANGIOGRAPHY ADULT ORDER       HYSTERECTOMY, SARAH  1976    Total abdominal hysterectomy & bilateral salpingectomy oophorectomy     TONSILLECTOMY & ADENOIDECTOMY      as child       Social History   Substance Use Topics     Smoking status: Former Smoker     Quit date: 1/1/1970     Smokeless tobacco: Never Used      Comment: 20 year hx of smoking 3 packs of cigarettes daily; quit 1970     Alcohol use No     Family History   Problem Relation Age of Onset     Alcohol/Drug Father      DIABETES Brother      DIABETES Brother      Arthritis Mother 20     Other - See Comments Daughter      Fallopian tube infection 06/2015         Current Outpatient Prescriptions   Medication Sig Dispense Refill     levothyroxine (SYNTHROID/LEVOTHROID) 75 MCG tablet Take 1 tablet (75 mcg) by mouth daily 90 tablet 3     allopurinol (ZYLOPRIM) 100 MG tablet Take 1 tablet (100 mg) by mouth daily 90 tablet 3     GLUCOSAMINE SULFATE PO Take 1 tablet by mouth 2 times daily        nystatin (MYCOSTATIN) 744289 UNIT/GM POWD Apply to area where you get a yeast  "infection daily 60 g 1     acetaminophen (TYLENOL) 325 MG tablet 650 BID and BID  tablet      atorvastatin (LIPITOR) 20 MG tablet Take 1 tablet (20 mg) by mouth daily 90 tablet 1     ALOE PO Take 2 capsules by mouth daily        multivitamin, therapeutic with minerals (MULTI-VITAMIN) TABS Take 1 tablet by mouth daily 100 tablet 3     aspirin EC 81 MG EC tablet Take 1 tablet (81 mg) by mouth daily       nitroglycerin (NITROSTAT) 0.4 MG SL tablet Place 1 tablet (0.4 mg) under the tongue every 5 minutes as needed for chest pain 60 tablet 1     Allergies   Allergen Reactions     Sulfa Drugs Other (See Comments)     Causes burning when urinates. Causes yeast infections.     Percocet [Oxycodone-Acetaminophen] Other (See Comments)     Freaked out and broke a toilet at [facility]     Labs reviewed in EPIC    Reviewed and updated as needed this visit by clinical staff       Reviewed and updated as needed this visit by Provider         ROS:  Constitutional, HEENT, cardiovascular, pulmonary, gi and gu systems are negative, except as otherwise noted.    OBJECTIVE:                                                    /84 (BP Location: Other (Comment), Cuff Size: Adult Small)  Pulse 64  Temp 97.9  F (36.6  C) (Tympanic)  Ht 5' 3\" (1.6 m)  Wt 223 lb (101.2 kg)  LMP 01/14/2013  SpO2 93%  BMI 39.5 kg/m2  Body mass index is 39.5 kg/(m^2).  GENERAL: healthy, alert and no distress  MS: tenderness to palpation at right trochanter and mid low sacrum, discomfort with right hip internal rotation  SKIN: no erythema or skin breakdown at sacral/coccyx area  PSYCH: mentation appears normal, affect normal/bright    Diagnostic Test Results:  Xray -  SACRUM AND COCCYX TWO VIEWS  6/13/2017 1:39 PM    HISTORY: Sacrococcygeal disorders, not elsewhere classified.    COMPARISON: None.             Impression             IMPRESSION: No definite sacral fracture. No definite coccygeal  fracture or dislocation.    ALAN MELENDEZ MD       "          XR HIP RIGHT 2-3 VIEWS 6/13/2017 1:39 PM    COMPARISON: None.    HISTORY: Right hip pain.             Impression             IMPRESSION: Moderate degenerative changes in the right hip with mild  to moderate joint space loss. No fractures are seen.    ANNABEL HAGEN              Last Resulted: 06/14/17  7:54 AM           ASSESSMENT/PLAN:                                                      1. Primary osteoarthritis of right hip  No evidence of fracture on x ray. Moderate degenerative changes at right hip. Would like to see ortho for ongoing symptoms and significant degenerative changes. Symptomatic care and follow up discussed.  - ORTHOPEDICS ADULT REFERRAL    2. Right hip pain    - XR Hip Right 2-3 Views; Future  - ORTHOPEDICS ADULT REFERRAL    3. Sacral back pain     - XR Sacrum and Coccyx 2 Views; Future    Home care instructions were reviewed with the patient. The risks, benefits and treatment options of prescribed medications or other treatments have been discussed with the patient. The patient verbalized their understanding and should call or follow up if no improvement or if they develop further problems.      Patient Instructions     Ortho referral made  Osteoarthritis  Osteoarthritis (also called degenerative joint disease) happens when the cartilage in a joint becomes damaged and worn. This may be due to age, wear and tear, overuse of the joint, or other problems. Osteoarthritis can affect any joint. But it is most common in hands, knees, spine, hips, and feet. Symptoms include joint stiffness, pain, and swelling.  Home care    When a joint is more sore than usual, rest it for a day or two.    Heat can help relieve stiffness. Take a hot bath or apply a heating pad for up to 30 minutes at a time. If symptoms are worse in the morning, using heat just after awakening can help relax the muscle and soothe the joints.     Ice helps relieve pain and swelling. It is often used after activity. Use a cold pack  wrapped in a thin cloth on the joint for 10-15 minutes at a time.     Alternating hot and cold can also help relieve pain. Try this for 20 minutes at a time, several times per day.    Exercise helps prevent the muscles and ligaments around the joint from becoming weak. It also helps maintain function in the joint.  Be as active as you can. Talk to your doctor about what activity program is best for you.    Excess weight puts a lot of extra strain on weight-bearing joints of the lower back, hips, knees, feet and ankles. If you are overweight, talk to your doctor about a safe and effective weight loss program.    Use anti-inflammatory medications as prescribed for pain. This incudes acetaminophen or NSAIDs such as ibuprofen or naproxen. If needed, topical or injected medications may be recommended. Talk to your doctor if these options are not enough to manage your pain.    Talk with your doctor about devices that might help improve your function and reduce pain.  Follow-up care  Follow up with your doctor as advised by our staff.  When to seek medical attention  Call your doctor right away if any of the following occur:    Redness or swelling of a painful joint    Discharge or pus from a painful joint    Fever of 100.4 F (38 C) or higher, or as directed by your healthcare provider    Worsening joint pain    Decreased ability to move the joint or bear weight on the joint    3797-4783 [employer]. [address] [place] [address], [place] [address]. All rights reserved. This information is not intended as a substitute for professional medical care. Always follow your healthcare professional's instructions.            ROSA Patel [facility]

## 2017-06-13 NOTE — MR AVS SNAPSHOT
After Visit Summary   6/13/2017    Elly Choe    MRN: [id number]           Patient Information     Date Of Birth          1935        Visit Information        Provider Department      6/13/2017 12:40 PM Joy Kam APRN CNP [facility]        Today's Diagnoses     Right hip pain    -  1    Sacral back pain        Primary osteoarthritis of right hip          Care Instructions      Ortho referral made  Osteoarthritis  Osteoarthritis (also called degenerative joint disease) happens when the cartilage in a joint becomes damaged and worn. This may be due to age, wear and tear, overuse of the joint, or other problems. Osteoarthritis can affect any joint. But it is most common in hands, knees, spine, hips, and feet. Symptoms include joint stiffness, pain, and swelling.  Home care    When a joint is more sore than usual, rest it for a day or two.    Heat can help relieve stiffness. Take a hot bath or apply a heating pad for up to 30 minutes at a time. If symptoms are worse in the morning, using heat just after awakening can help relax the muscle and soothe the joints.     Ice helps relieve pain and swelling. It is often used after activity. Use a cold pack wrapped in a thin cloth on the joint for 10-15 minutes at a time.     Alternating hot and cold can also help relieve pain. Try this for 20 minutes at a time, several times per day.    Exercise helps prevent the muscles and ligaments around the joint from becoming weak. It also helps maintain function in the joint.  Be as active as you can. Talk to your doctor about what activity program is best for you.    Excess weight puts a lot of extra strain on weight-bearing joints of the lower back, hips, knees, feet and ankles. If you are overweight, talk to your doctor about a safe and effective weight loss program.    Use anti-inflammatory medications as prescribed for pain. This incudes acetaminophen or NSAIDs such as  ibuprofen or naproxen. If needed, topical or injected medications may be recommended. Talk to your doctor if these options are not enough to manage your pain.    Talk with your doctor about devices that might help improve your function and reduce pain.  Follow-up care  Follow up with your doctor as advised by our staff.  When to seek medical attention  Call your doctor right away if any of the following occur:    Redness or swelling of a painful joint    Discharge or pus from a painful joint    Fever of 100.4 F (38 C) or higher, or as directed by your healthcare provider    Worsening joint pain    Decreased ability to move the joint or bear weight on the joint    7679-6743 [employer]. [address] [place] [address]. All rights reserved. This information is not intended as a substitute for professional medical care. Always follow your healthcare professional's instructions.                Follow-ups after your visit        Additional Services     ORTHOPEDICS ADULT REFERRAL       Your provider has referred you to: [facility] [place] [phone]   [email or web address]    Please be aware that coverage of these services is subject to the terms and limitations of your health insurance plan.  Call member services at your health plan with any benefit or coverage questions.      Please bring the following to your appointment:    >>   Any x-rays, CTs or MRIs which have been performed.  Contact the facility where they were done to arrange for  prior to your scheduled appointment.    >>   List of current medications   >>   This referral request   >>   Any documents/labs given to you for this referral                  Who to contact     If you have questions or need follow up information about today's clinic visit or your schedule please contact [facility] directly at [phone].  Normal or non-critical lab and  "imaging results will be communicated to you by MyChart, letter or phone within 4 business days after the clinic has received the results. If you do not hear from us within 7 days, please contact the clinic through Codotat or phone. If you have a critical or abnormal lab result, we will notify you by phone as soon as possible.  Submit refill requests through [employer] or call your pharmacy and they will forward the refill request to us. Please allow 3 business days for your refill to be completed.          Additional Information About Your Visit        knowNormalharAntidot Information     Playground Sessions lets you send messages to your doctor, view your test results, renew your prescriptions, schedule appointments and more. To sign up, go to [email or web address] . Click on \"Log in\" on the left side of the screen, which will take you to the Welcome page. Then click on \"Sign up Now\" on the right side of the page.     You will be asked to enter the access code listed below, as well as some personal information. Please follow the directions to create your username and password.     Your access code is: [email or web address]  Expires: 2017  9:50 AM     Your access code will  in 90 days. If you need help or a new code, please call your [place] clinic or [phone].        Care EveryWhere ID     This is your Care EveryWhere ID. This could be used by other organizations to access your [place] medical records  [id number]        Your Vitals Were     Pulse Temperature Height Last Period Pulse Oximetry BMI (Body Mass Index)    64 97.9  F (36.6  C) (Tympanic) 5' 3\" (1.6 m) 1977 93% 39.5 kg/m2       Blood Pressure from Last 3 Encounters:   17 132/84   17 120/72   17 112/60    Weight from Last 3 Encounters:   17 223 lb (101.2 kg)   17 223 lb (101.2 kg)   17 226 lb (102.5 kg)              We Performed the Following     ORTHOPEDICS ADULT REFERRAL        Primary Care Provider Office Phone # Fax #    " Malina Waters,  [phone]       [facility] [address] [facility] [address]        Thank you!     Thank you for choosing [facility]  for your care. Our goal is always to provide you with excellent care. Hearing back from our patients is one way we can continue to improve our services. Please take a few minutes to complete the written survey that you may receive in the mail after your visit with us. Thank you!             Your Updated Medication List - Protect others around you: Learn how to safely use, store and throw away your medicines at www.disposemymeds.org.          This list is accurate as of: 6/13/17  1:59 PM.  Always use your most recent med list.                   Brand Name Dispense Instructions for use    acetaminophen 325 MG tablet    TYLENOL    100 tablet    650 BID and BID PRN       allopurinol 100 MG tablet    ZYLOPRIM    90 tablet    Take 1 tablet (100 mg) by mouth daily       ALOE PO      Take 2 capsules by mouth daily       aspirin 81 MG EC tablet      Take 1 tablet (81 mg) by mouth daily       atorvastatin 20 MG tablet    LIPITOR    90 tablet    Take 1 tablet (20 mg) by mouth daily       GLUCOSAMINE SULFATE PO      Take 1 tablet by mouth 2 times daily       levothyroxine 75 MCG tablet    SYNTHROID/LEVOTHROID    90 tablet    Take 1 tablet (75 mcg) by mouth daily       multivitamin, therapeutic with minerals Tabs tablet     100 tablet    Take 1 tablet by mouth daily       nitroglycerin 0.4 MG sublingual tablet    NITROSTAT    60 tablet    Place 1 tablet (0.4 mg) under the tongue every 5 minutes as needed for chest pain       nystatin 562108 UNIT/GM Powd    MYCOSTATIN    60 g    Apply to area where you get a yeast infection daily

## 2017-06-13 NOTE — PATIENT INSTRUCTIONS
Ortho referral made  Osteoarthritis  Osteoarthritis (also called degenerative joint disease) happens when the cartilage in a joint becomes damaged and worn. This may be due to age, wear and tear, overuse of the joint, or other problems. Osteoarthritis can affect any joint. But it is most common in hands, knees, spine, hips, and feet. Symptoms include joint stiffness, pain, and swelling.  Home care    When a joint is more sore than usual, rest it for a day or two.    Heat can help relieve stiffness. Take a hot bath or apply a heating pad for up to 30 minutes at a time. If symptoms are worse in the morning, using heat just after awakening can help relax the muscle and soothe the joints.     Ice helps relieve pain and swelling. It is often used after activity. Use a cold pack wrapped in a thin cloth on the joint for 10-15 minutes at a time.     Alternating hot and cold can also help relieve pain. Try this for 20 minutes at a time, several times per day.    Exercise helps prevent the muscles and ligaments around the joint from becoming weak. It also helps maintain function in the joint.  Be as active as you can. Talk to your doctor about what activity program is best for you.    Excess weight puts a lot of extra strain on weight-bearing joints of the lower back, hips, knees, feet and ankles. If you are overweight, talk to your doctor about a safe and effective weight loss program.    Use anti-inflammatory medications as prescribed for pain. This incudes acetaminophen or NSAIDs such as ibuprofen or naproxen. If needed, topical or injected medications may be recommended. Talk to your doctor if these options are not enough to manage your pain.    Talk with your doctor about devices that might help improve your function and reduce pain.  Follow-up care  Follow up with your doctor as advised by our staff.  When to seek medical attention  Call your doctor right away if any of the following occur:    Redness or swelling of a  painful joint    Discharge or pus from a painful joint    Fever of 100.4 F (38 C) or higher, or as directed by your healthcare provider    Worsening joint pain    Decreased ability to move the joint or bear weight on the joint    0110-4255 [employer]. [address] [place] [address]. All rights reserved. This information is not intended as a substitute for professional medical care. Always follow your healthcare professional's instructions.

## 2017-06-13 NOTE — NURSING NOTE
"Chief Complaint   Patient presents with     Musculoskeletal Problem       Initial /84 (BP Location: Other (Comment), Cuff Size: Adult Small)  Pulse 64  Temp 97.9  F (36.6  C) (Tympanic)  Ht 5' 3\" (1.6 m)  Wt 223 lb (101.2 kg)  LMP 01/14/2013  BMI 39.5 kg/m2 Estimated body mass index is 39.5 kg/(m^2) as calculated from the following:    Height as of this encounter: 5' 3\" (1.6 m).    Weight as of this encounter: 223 lb (101.2 kg).  Medication Reconciliation: complete    Health Maintenance that is potentially due pending provider review:  NONE    n/a      "

## 2017-07-18 ENCOUNTER — OFFICE VISIT (OUTPATIENT)
Dept: FAMILY MEDICINE | Facility: CLINIC | Age: 82
End: 2017-07-18
Payer: COMMERCIAL

## 2017-07-18 VITALS
SYSTOLIC BLOOD PRESSURE: 122 MMHG | TEMPERATURE: 98.4 F | BODY MASS INDEX: 39.51 KG/M2 | DIASTOLIC BLOOD PRESSURE: 72 MMHG | WEIGHT: 223 LBS | HEIGHT: 63 IN | OXYGEN SATURATION: 96 % | HEART RATE: 74 BPM

## 2017-07-18 DIAGNOSIS — R39.89 URINARY PROBLEM: ICD-10-CM

## 2017-07-18 DIAGNOSIS — N30.01 ACUTE CYSTITIS WITH HEMATURIA: Primary | ICD-10-CM

## 2017-07-18 LAB
ALBUMIN UR-MCNC: ABNORMAL MG/DL
APPEARANCE UR: ABNORMAL
BACTERIA #/AREA URNS HPF: ABNORMAL /HPF
BILIRUB UR QL STRIP: NEGATIVE
COLOR UR AUTO: YELLOW
GLUCOSE UR STRIP-MCNC: NEGATIVE MG/DL
HGB UR QL STRIP: ABNORMAL
KETONES UR STRIP-MCNC: NEGATIVE MG/DL
LEUKOCYTE ESTERASE UR QL STRIP: ABNORMAL
NITRATE UR QL: POSITIVE
PH UR STRIP: 5.5 PH (ref 5–7)
RBC #/AREA URNS AUTO: ABNORMAL /HPF (ref 0–2)
SP GR UR STRIP: 1.01 (ref 1–1.03)
URN SPEC COLLECT METH UR: ABNORMAL
UROBILINOGEN UR STRIP-ACNC: 0.2 EU/DL (ref 0.2–1)
WBC #/AREA URNS AUTO: >100 /HPF (ref 0–2)

## 2017-07-18 PROCEDURE — 99213 OFFICE O/P EST LOW 20 MIN: CPT | Performed by: NURSE PRACTITIONER

## 2017-07-18 PROCEDURE — 81001 URINALYSIS AUTO W/SCOPE: CPT | Performed by: NURSE PRACTITIONER

## 2017-07-18 PROCEDURE — 87086 URINE CULTURE/COLONY COUNT: CPT | Performed by: NURSE PRACTITIONER

## 2017-07-18 PROCEDURE — 87186 SC STD MICRODIL/AGAR DIL: CPT | Performed by: NURSE PRACTITIONER

## 2017-07-18 PROCEDURE — 87088 URINE BACTERIA CULTURE: CPT | Performed by: NURSE PRACTITIONER

## 2017-07-18 RX ORDER — CEPHALEXIN 500 MG/1
500 CAPSULE ORAL 2 TIMES DAILY
Qty: 10 CAPSULE | Refills: 0 | Status: SHIPPED | OUTPATIENT
Start: 2017-07-18 | End: 2017-07-23

## 2017-07-18 NOTE — NURSING NOTE
"Chief Complaint   Patient presents with     Urinary Problem       Initial /72 (Cuff Size: Adult Large)  Pulse 74  Temp 98.4  F (36.9  C) (Tympanic)  Ht 5' 3\" (1.6 m)  Wt 223 lb (101.2 kg)  LMP 01/01/1977  SpO2 96%  BMI 39.5 kg/m2 Estimated body mass index is 39.5 kg/(m^2) as calculated from the following:    Height as of this encounter: 5' 3\" (1.6 m).    Weight as of this encounter: 223 lb (101.2 kg).  Medication Reconciliation: complete    Health Maintenance that is potentially due pending provider review:  NONE    Malina Metcalf, CMA        "

## 2017-07-18 NOTE — PROGRESS NOTES
SUBJECTIVE:                                                    Elly Choe is a 81 year old female who presents to clinic today for the following health issues:      URINARY TRACT SYMPTOMS  Onset: worse x 1 week     Description:   Painful urination (Dysuria): YES- burning   Blood in urine (Hematuria): no   Delay in urine (Hesitency): no     Intensity: moderate    Progression of Symptoms:  improving    Accompanying Signs & Symptoms:  Fever/chills: no   Flank pain no   Nausea and vomiting: no   Any vaginal symptoms: burning sensation   Abdominal/Pelvic Pain: YES- burning   Dizziness     History:   History of frequent UTI's: YES  History of kidney stones: no   Sexually Active: no   Possibility of pregnancy: No    Precipitating factors:   none    Therapies Tried and outcome: used vinegar on vaginal area - helped with burning sensation      Problem list and histories reviewed & adjusted, as indicated.  Additional history: as documented    Patient Active Problem List   Diagnosis     Gastroesophageal reflux disease without esophagitis     Hemangioma     Benign essential hypertension     Diverticulitis of colon     surgical menopause (SARAH BSO) for non-cancerous reasons     Morbid obesity due to excess calories (H)     Idiopathic chronic gout of multiple sites without tophus     Chronic airway obstruction (H)     Acute dermatitis due to solar radiation     Hyperlipidemia LDL goal <100     CKD (chronic kidney disease) stage 3, GFR 30-59 ml/min     Advanced directives, counseling/discussion     Acquired hypothyroidism     DJD of shoulder     Phlebitis and thrombophlebitis of superficial vessels of lower extremities     Status post shoulder joint replacement     Anemia     Primary localized osteoarthrosis, lower leg     S/P knee replacement left     Generalized anxiety disorder     Health Care Home     Coronary artery disease involving native coronary artery of native heart without angina pectoris     S/P PTCA  (percutaneous transluminal coronary angioplasty)     FH: rheumatoid arthritis     Osteopenia     Status post total shoulder arthroplasty, left     Past Surgical History:   Procedure Laterality Date     ARTHROPLASTY KNEE  4/13/2011    Procedure:ARTHROPLASTY KNEE; Surgeon:SEN PARRY; Location:[facility]     ARTHROPLASTY KNEE  12/26/2012    Procedure: ARTHROPLASTY KNEE;  Left Total Knee Arthroplasty;  Surgeon: Sen Parry MD;  Location: [facility]     ARTHROPLASTY SHOULDER  9/26/2012    Procedure: ARTHROPLASTY SHOULDER;  Right total shoulder arthroplasty;  Surgeon: Sen Parry MD;  Location: [facility]     ARTHROPLASTY SHOULDER Left 5/4/2016    Procedure: ARTHROPLASTY SHOULDER;  Surgeon: Sen Parry MD;  Location: [facility]     ARTHROSCOPY KNEE IRRIGATION AND DEBRIDEMENT  12/10/2010    ARTHROSCOPY KNEE IRRIGATION AND DEBRIDEMENT performed by LEY, JEFFREY DUANE at [facility]     CATARACT IOL, RT/LT  4/2010    bilateral     COLONOSCOPY  2002     COLONOSCOPY  6/19/2014    Procedure: COLONOSCOPY;  Surgeon: Steve Deng MD;  Location: [facility]     CORONARY ANGIOGRAPHY ADULT ORDER       HYSTERECTOMY, SARAH  1976    Total abdominal hysterectomy & bilateral salpingectomy oophorectomy     TONSILLECTOMY & ADENOIDECTOMY      as child       Social History   Substance Use Topics     Smoking status: Former Smoker     Quit date: 1/1/1970     Smokeless tobacco: Never Used      Comment: 20 year hx of smoking 3 packs of cigarettes daily; quit 1970     Alcohol use No     Family History   Problem Relation Age of Onset     Alcohol/Drug Father      DIABETES Brother      DIABETES Brother      Arthritis Mother 20     Other - See Comments Daughter      Fallopian tube infection 06/2015         Current Outpatient Prescriptions   Medication Sig Dispense Refill     cephALEXin (KEFLEX) 500 MG capsule Take 1 capsule (500 mg) by mouth 2 times daily for 5 days 10 capsule 0     levothyroxine (SYNTHROID/LEVOTHROID) 75 MCG tablet Take 1 tablet (75 mcg)  "by mouth daily 90 tablet 3     allopurinol (ZYLOPRIM) 100 MG tablet Take 1 tablet (100 mg) by mouth daily 90 tablet 3     GLUCOSAMINE SULFATE PO Take 1 tablet by mouth 2 times daily        nystatin (MYCOSTATIN) 450100 UNIT/GM POWD Apply to area where you get a yeast infection daily 60 g 1     acetaminophen (TYLENOL) 325 MG tablet 650 BID and BID  tablet      atorvastatin (LIPITOR) 20 MG tablet Take 1 tablet (20 mg) by mouth daily 90 tablet 1     ALOE PO Take 2 capsules by mouth daily        multivitamin, therapeutic with minerals (MULTI-VITAMIN) TABS Take 1 tablet by mouth daily 100 tablet 3     aspirin EC 81 MG EC tablet Take 1 tablet (81 mg) by mouth daily       nitroglycerin (NITROSTAT) 0.4 MG SL tablet Place 1 tablet (0.4 mg) under the tongue every 5 minutes as needed for chest pain 60 tablet 1     Allergies   Allergen Reactions     Sulfa Drugs Other (See Comments)     Causes burning when urinates. Causes yeast infections.     Percocet [Oxycodone-Acetaminophen] Other (See Comments)     Freaked out and broke a toilet at [facility]     Labs reviewed in EPIC      Reviewed and updated as needed this visit by clinical staff       Reviewed and updated as needed this visit by Provider       ROS:  Constitutional, HEENT, cardiovascular, pulmonary, gi and gu systems are negative, except as otherwise noted.      OBJECTIVE:   /72 (Cuff Size: Adult Large)  Pulse 74  Temp 98.4  F (36.9  C) (Tympanic)  Ht 5' 3\" (1.6 m)  Wt 223 lb (101.2 kg)  LMP 01/01/1977  SpO2 96%  BMI 39.5 kg/m2  Body mass index is 39.5 kg/(m^2).  GENERAL: healthy, alert and no distress  RESP: lungs clear to auscultation - no rales, rhonchi or wheezes  CV: regular rate and rhythm, normal S1 S2, no S3 or S4, no murmur, click or rub  ABDOMEN: soft, nontender, and bowel sounds normal  BACK: no CVA tenderness, no paralumbar tenderness  PSYCH: mentation appears normal, affect normal/bright    Diagnostic Test Results:  Results for orders " placed or performed in visit on 07/18/17   *UA reflex to Microscopic and Culture ([place] and [facility] (except [place] and [place])   Result Value Ref Range    Color Urine Yellow     Appearance Urine Cloudy     Glucose Urine Negative NEG mg/dL    Bilirubin Urine Negative NEG    Ketones Urine Negative NEG mg/dL    Specific Gravity Urine 1.015 1.003 - 1.035    Blood Urine Small (A) NEG    pH Urine 5.5 5.0 - 7.0 pH    Protein Albumin Urine Trace (A) NEG mg/dL    Urobilinogen Urine 0.2 0.2 - 1.0 EU/dL    Nitrite Urine Positive (A) NEG    Leukocyte Esterase Urine Large (A) NEG    Source Midstream Urine    Urine Microscopic   Result Value Ref Range    WBC Urine >100 (A) 0 - 2 /HPF    RBC Urine 2-5 (A) 0 - 2 /HPF    Bacteria Urine Many (A) NEG /HPF   Urine Culture Aerobic Bacterial   Result Value Ref Range    Specimen Description Midstream Urine     Culture Micro (A)      >100,000 colonies/mL Gram negative rods  Identification and susceptibility to follow      Micro Report Status Pending        ASSESSMENT/PLAN:     1. Acute cystitis with hematuria  No acute distress.  Keflex sent to the pharmacy for symptoms.  Culture pending.  Symptomatic care and follow up discussed.  - cephALEXin (KEFLEX) 500 MG capsule; Take 1 capsule (500 mg) by mouth 2 times daily for 5 days  Dispense: 10 capsule; Refill: 0    2. Urinary problem    - *UA reflex to Microscopic and Culture ([place] and [facility] (except [place] and [place])  - Urine Microscopic  - Urine Culture Aerobic Bacterial    Home care instructions were reviewed with the patient. The risks, benefits and treatment options of prescribed medications or other treatments have been discussed with the patient. The patient verbalized their understanding and should call or follow up if no improvement or if they develop further problems.      Patient Instructions   Keflex sent to the pharmacy for urinary tract infection    Follow up if symptoms do not improve or  "worsen.       * BLADDER INFECTION,Female (Adult)    A bladder infection (\"cystitis\" or \"UTI\") usually causes a constant urge to urinate and a burning when passing urine. Urine may be cloudy, smelly or dark. There may be pain in the lower abdomen. A bladder infection occurs when bacteria from the vaginal area enter the bladder opening (urethra). This can occur from sexual intercourse, wearing tight clothing, dehydration and other factors.  HOME CARE:  1. Drink lots of fluids (at least 6-8 glasses a day, unless you must restrict fluids for other medical reasons). This will force the medicine into your urinary system and flush the bacteria out of your body. Cranberry juice has been shown to help clear out the bacteria.  2. Avoid sexual intercourse until your symptoms are gone.  3. A bladder infection is treated with antibiotics. You may also be given Pyridium (generic = phenazopyridine) to reduce the burning sensation. This medicine will cause your urine to become a bright orange color. The orange urine may stain clothing. You may wear a pad or panty-liner to protect clothing.  PREVENTING FUTURE INFECTIONS:  1. Always wipe from front to back after a bowel movement.  2. Keep the genital area clean and dry.  3. Drink plenty of fluids each day to avoid dehydration.  4. Urinate right after intercourse to flush out the bladder.  5. Wear cotton underwear and cotton-lined panty hose; avoid tight-fitting pants.  6. If you are on birth control pills and are having frequent bladder infections, discuss with your doctor.  FOLLOW UP: Return to this facility or see your doctor if ALL symptoms are not gone after three days of treatment.  GET PROMPT MEDICAL ATTENTION if any of the following occur:    Fever over 101 F (38.3 C)    No improvement by the third day of treatment    Increasing back or abdominal pain    Repeated vomiting; unable to keep medicine down    Weakness, dizziness or fainting    Vaginal discharge    Pain, redness or " swelling in the labia (outer vaginal area)    9292-7917 [employer], [address] [place] [address], [place] [address]. All rights reserved. This information is not intended as a substitute for professional medical care. Always follow your healthcare professional's instructions.        ROSA Patel [facility]

## 2017-07-18 NOTE — MR AVS SNAPSHOT
"              After Visit Summary   7/18/2017    Elly Choe    MRN: [id number]           Patient Information     Date Of Birth          1935        Visit Information        Provider Department      7/18/2017 10:40 AM Joy Kam APRN [facility]        Today's Diagnoses     Acute cystitis with hematuria    -  1    Urinary problem          Care Instructions    Keflex sent to the pharmacy for urinary tract infection    Follow up if symptoms do not improve or worsen.       * BLADDER INFECTION,Female (Adult)    A bladder infection (\"cystitis\" or \"UTI\") usually causes a constant urge to urinate and a burning when passing urine. Urine may be cloudy, smelly or dark. There may be pain in the lower abdomen. A bladder infection occurs when bacteria from the vaginal area enter the bladder opening (urethra). This can occur from sexual intercourse, wearing tight clothing, dehydration and other factors.  HOME CARE:  1. Drink lots of fluids (at least 6-8 glasses a day, unless you must restrict fluids for other medical reasons). This will force the medicine into your urinary system and flush the bacteria out of your body. Cranberry juice has been shown to help clear out the bacteria.  2. Avoid sexual intercourse until your symptoms are gone.  3. A bladder infection is treated with antibiotics. You may also be given Pyridium (generic = phenazopyridine) to reduce the burning sensation. This medicine will cause your urine to become a bright orange color. The orange urine may stain clothing. You may wear a pad or panty-liner to protect clothing.  PREVENTING FUTURE INFECTIONS:  1. Always wipe from front to back after a bowel movement.  2. Keep the genital area clean and dry.  3. Drink plenty of fluids each day to avoid dehydration.  4. Urinate right after intercourse to flush out the bladder.  5. Wear cotton underwear and cotton-lined panty hose; avoid tight-fitting pants.  6. If you " "are on birth control pills and are having frequent bladder infections, discuss with your doctor.  FOLLOW UP: Return to this facility or see your doctor if ALL symptoms are not gone after three days of treatment.  GET PROMPT MEDICAL ATTENTION if any of the following occur:    Fever over 101 F (38.3 C)    No improvement by the third day of treatment    Increasing back or abdominal pain    Repeated vomiting; unable to keep medicine down    Weakness, dizziness or fainting    Vaginal discharge    Pain, redness or swelling in the labia (outer vaginal area)    2405-6671 [employer], [address] [place] [address], [place] [address]. All rights reserved. This information is not intended as a substitute for professional medical care. Always follow your healthcare professional's instructions.            Follow-ups after your visit        Who to contact     If you have questions or need follow up information about today's clinic visit or your schedule please contact [facility] directly at [phone].  Normal or non-critical lab and imaging results will be communicated to you by MyChart, letter or phone within 4 business days after the clinic has received the results. If you do not hear from us within 7 days, please contact the clinic through [email or web address] or phone. If you have a critical or abnormal lab result, we will notify you by phone as soon as possible.  Submit refill requests through [employer] or call your pharmacy and they will forward the refill request to us. Please allow 3 business days for your refill to be completed.          Additional Information About Your Visit        [employer] Information     [employer] lets you send messages to your doctor, view your test results, renew your prescriptions, schedule appointments and more. To sign up, go to [email or web address] . Click on \"Log in\" on the left side of the screen, which will take you to the Welcome page. Then click on \"Sign up Now\" on the right side " "of the page.     You will be asked to enter the access code listed below, as well as some personal information. Please follow the directions to create your username and password.     Your access code is: [email or web address]  Expires: 2017  9:50 AM     Your access code will  in 90 days. If you need help or a new code, please call your [facility] clinic or [phone].        Care EveryWhere ID     This is your Care EveryWhere ID. This could be used by other organizations to access your [facility] medical records  [id number]        Your Vitals Were     Pulse Temperature Height Last Period Pulse Oximetry BMI (Body Mass Index)    74 98.4  F (36.9  C) (Tympanic) 5' 3\" (1.6 m) 1977 96% 39.5 kg/m2       Blood Pressure from Last 3 Encounters:   17 122/72   17 132/84   17 120/72    Weight from Last 3 Encounters:   17 223 lb (101.2 kg)   17 223 lb (101.2 kg)   17 223 lb (101.2 kg)              We Performed the Following     *UA reflex to Microscopic and Culture ([place] and [facility] (except [place] and [place])     Urine Culture Aerobic Bacterial     Urine Microscopic          Today's Medication Changes          These changes are accurate as of: 17 11:07 AM.  If you have any questions, ask your nurse or doctor.               Start taking these medicines.        Dose/Directions    cephALEXin 500 MG capsule   Commonly known as:  KEFLEX   Used for:  Acute cystitis with hematuria   Started by:  Joy Kam APRN CNP        Dose:  500 mg   Take 1 capsule (500 mg) by mouth 2 times daily for 5 days   Quantity:  10 capsule   Refills:  0            Where to get your medicines      These medications were sent to [facility] [address] [address]     Phone:  [phone]     cephALEXin 500 MG capsule                Primary Care Provider Office Phone # Fax #    ROSA Steven CNP " [phone]       [facility] [address] [facility] [address]        Equal Access to Services     MASON VICENTE : Hadii aad ku hadvirgilio Harrington, alejandro alejandranino, evan ramos, isidro mack laashantiannette lam. So [facility] [phone].    ATENCIÓN: Si habla español, tiene a willis disposición servicios gratuitos de asistencia lingüística. Llame al [phone].    We comply with applicable federal civil rights laws and [place] laws. We do not discriminate on the basis of race, color, national origin, age, disability sex, sexual orientation or gender identity.            Thank you!     Thank you for choosing [facility]  for your care. Our goal is always to provide you with excellent care. Hearing back from our patients is one way we can continue to improve our services. Please take a few minutes to complete the written survey that you may receive in the mail after your visit with us. Thank you!             Your Updated Medication List - Protect others around you: Learn how to safely use, store and throw away your medicines at www.disposemymeds.org.          This list is accurate as of: 7/18/17 11:07 AM.  Always use your most recent med list.                   Brand Name Dispense Instructions for use Diagnosis    acetaminophen 325 MG tablet    TYLENOL    100 tablet    650 BID and BID PRN    Primary osteoarthritis of left shoulder       allopurinol 100 MG tablet    ZYLOPRIM    90 tablet    Take 1 tablet (100 mg) by mouth daily    Gouty arthropathy       ALOE PO      Take 2 capsules by mouth daily        aspirin 81 MG EC tablet      Take 1 tablet (81 mg) by mouth daily    CAD (coronary artery disease)       atorvastatin 20 MG tablet    LIPITOR    90 tablet    Take 1 tablet (20 mg) by mouth daily    Hyperlipidemia LDL goal <100       cephALEXin 500 MG capsule    KEFLEX    10 capsule    Take 1 capsule (500 mg) by mouth 2 times daily for 5 days    Acute  cystitis with hematuria       GLUCOSAMINE SULFATE PO      Take 1 tablet by mouth 2 times daily        levothyroxine 75 MCG tablet    SYNTHROID/LEVOTHROID    90 tablet    Take 1 tablet (75 mcg) by mouth daily    Acquired hypothyroidism       multivitamin, therapeutic with minerals Tabs tablet     100 tablet    Take 1 tablet by mouth daily    Anemia, unspecified anemia type       nitroGLYcerin 0.4 MG sublingual tablet    NITROSTAT    60 tablet    Place 1 tablet (0.4 mg) under the tongue every 5 minutes as needed for chest pain    CAD (coronary artery disease)       nystatin 032279 UNIT/GM Powd    MYCOSTATIN    60 g    Apply to area where you get a yeast infection daily    Tinea corporis

## 2017-07-18 NOTE — PATIENT INSTRUCTIONS
"Keflex sent to the pharmacy for urinary tract infection    Follow up if symptoms do not improve or worsen.       * BLADDER INFECTION,Female (Adult)    A bladder infection (\"cystitis\" or \"UTI\") usually causes a constant urge to urinate and a burning when passing urine. Urine may be cloudy, smelly or dark. There may be pain in the lower abdomen. A bladder infection occurs when bacteria from the vaginal area enter the bladder opening (urethra). This can occur from sexual intercourse, wearing tight clothing, dehydration and other factors.  HOME CARE:  1. Drink lots of fluids (at least 6-8 glasses a day, unless you must restrict fluids for other medical reasons). This will force the medicine into your urinary system and flush the bacteria out of your body. Cranberry juice has been shown to help clear out the bacteria.  2. Avoid sexual intercourse until your symptoms are gone.  3. A bladder infection is treated with antibiotics. You may also be given Pyridium (generic = phenazopyridine) to reduce the burning sensation. This medicine will cause your urine to become a bright orange color. The orange urine may stain clothing. You may wear a pad or panty-liner to protect clothing.  PREVENTING FUTURE INFECTIONS:  1. Always wipe from front to back after a bowel movement.  2. Keep the genital area clean and dry.  3. Drink plenty of fluids each day to avoid dehydration.  4. Urinate right after intercourse to flush out the bladder.  5. Wear cotton underwear and cotton-lined panty hose; avoid tight-fitting pants.  6. If you are on birth control pills and are having frequent bladder infections, discuss with your doctor.  FOLLOW UP: Return to this facility or see your doctor if ALL symptoms are not gone after three days of treatment.  GET PROMPT MEDICAL ATTENTION if any of the following occur:    Fever over 101 F (38.3 C)    No improvement by the third day of treatment    Increasing back or abdominal pain    Repeated vomiting; unable " to keep medicine down    Weakness, dizziness or fainting    Vaginal discharge    Pain, redness or swelling in the labia (outer vaginal area)    7519-7457 [employer], [address] [place] [address], [place] [address]. All rights reserved. This information is not intended as a substitute for professional medical care. Always follow your healthcare professional's instructions.

## 2017-07-20 LAB
BACTERIA SPEC CULT: ABNORMAL
MICRO REPORT STATUS: ABNORMAL
MICROORGANISM SPEC CULT: ABNORMAL
SPECIMEN SOURCE: ABNORMAL

## 2017-07-26 ENCOUNTER — OFFICE VISIT (OUTPATIENT)
Dept: FAMILY MEDICINE | Facility: CLINIC | Age: 82
End: 2017-07-26
Payer: COMMERCIAL

## 2017-07-26 VITALS
DIASTOLIC BLOOD PRESSURE: 77 MMHG | HEART RATE: 76 BPM | BODY MASS INDEX: 39.5 KG/M2 | TEMPERATURE: 98.8 F | WEIGHT: 223 LBS | SYSTOLIC BLOOD PRESSURE: 136 MMHG

## 2017-07-26 DIAGNOSIS — R30.0 DYSURIA: Primary | ICD-10-CM

## 2017-07-26 LAB
ALBUMIN UR-MCNC: NEGATIVE MG/DL
APPEARANCE UR: CLEAR
BACTERIA #/AREA URNS HPF: ABNORMAL /HPF
BILIRUB UR QL STRIP: NEGATIVE
COLOR UR AUTO: YELLOW
GLUCOSE UR STRIP-MCNC: NEGATIVE MG/DL
HGB UR QL STRIP: NEGATIVE
KETONES UR STRIP-MCNC: NEGATIVE MG/DL
LEUKOCYTE ESTERASE UR QL STRIP: ABNORMAL
NITRATE UR QL: NEGATIVE
NON-SQ EPI CELLS #/AREA URNS LPF: ABNORMAL /LPF
PH UR STRIP: 6.5 PH (ref 5–7)
RBC #/AREA URNS AUTO: ABNORMAL /HPF (ref 0–2)
SP GR UR STRIP: 1.01 (ref 1–1.03)
URN SPEC COLLECT METH UR: ABNORMAL
UROBILINOGEN UR STRIP-ACNC: 0.2 EU/DL (ref 0.2–1)
WBC #/AREA URNS AUTO: ABNORMAL /HPF (ref 0–2)
WBC CLUMPS #/AREA URNS HPF: PRESENT /HPF

## 2017-07-26 PROCEDURE — 81001 URINALYSIS AUTO W/SCOPE: CPT | Performed by: FAMILY MEDICINE

## 2017-07-26 PROCEDURE — 99213 OFFICE O/P EST LOW 20 MIN: CPT | Performed by: FAMILY MEDICINE

## 2017-07-26 RX ORDER — CIPROFLOXACIN 250 MG/1
250 TABLET, FILM COATED ORAL 2 TIMES DAILY
Qty: 14 TABLET | Refills: 0 | Status: SHIPPED | OUTPATIENT
Start: 2017-07-26 | End: 2017-08-02

## 2017-07-26 RX ORDER — CIPROFLOXACIN 500 MG/1
500 TABLET, FILM COATED ORAL 2 TIMES DAILY
Qty: 14 TABLET | Refills: 0 | Status: SHIPPED | OUTPATIENT
Start: 2017-07-26 | End: 2017-07-26 | Stop reason: DRUGHIGH

## 2017-07-26 NOTE — MR AVS SNAPSHOT
"              After Visit Summary   2017    Elly Choe    MRN: [id number]           Patient Information     Date Of Birth          1935        Visit Information        Provider Department      2017 1:40 PM Sen Anaya MD [facility]        Today's Diagnoses     Dysuria    -  1      Care Instructions    1. Looks like a recurrent UTI    2. Lets change antibiotics    3. Call if not better.    4. Lets think about a follow up Urine study.           Follow-ups after your visit        Who to contact     If you have questions or need follow up information about today's clinic visit or your schedule please contact [facility] directly at [phone].  Normal or non-critical lab and imaging results will be communicated to you by MyChart, letter or phone within 4 business days after the clinic has received the results. If you do not hear from us within 7 days, please contact the clinic through [employer] or phone. If you have a critical or abnormal lab result, we will notify you by phone as soon as possible.  Submit refill requests through [employer] or call your pharmacy and they will forward the refill request to us. Please allow 3 business days for your refill to be completed.          Additional Information About Your Visit        MyChart Information     [employer] lets you send messages to your doctor, view your test results, renew your prescriptions, schedule appointments and more. To sign up, go to [email or web address] . Click on \"Log in\" on the left side of the screen, which will take you to the Welcome page. Then click on \"Sign up Now\" on the right side of the page.     You will be asked to enter the access code listed below, as well as some personal information. Please follow the directions to create your username and password.     Your access code is: [email or web address]  Expires: 2017  9:50 AM     Your access code will  in 90 days. If " you need help or a new code, please call your [facility] clinic or [phone].        Care EveryWhere ID     This is your Care EveryWhere ID. This could be used by other organizations to access your [facility] medical records  [id number]        Your Vitals Were     Pulse Temperature Last Period BMI (Body Mass Index)          76 98.8  F (37.1  C) (Tympanic) 01/01/1977 39.5 kg/m2         Blood Pressure from Last 3 Encounters:   07/26/17 136/77   07/18/17 122/72   06/13/17 132/84    Weight from Last 3 Encounters:   07/26/17 223 lb (101.2 kg)   07/18/17 223 lb (101.2 kg)   06/13/17 223 lb (101.2 kg)              We Performed the Following     *UA reflex to Microscopic and Culture ([facility] (except [place] and [facility])     Urine Microscopic          Today's Medication Changes          These changes are accurate as of: 7/26/17  2:11 PM.  If you have any questions, ask your nurse or doctor.               Start taking these medicines.        Dose/Directions    ciprofloxacin 500 MG tablet   Commonly known as:  CIPRO   Used for:  Dysuria   Started by:  Sen Anaya MD        Dose:  500 mg   Take 1 tablet (500 mg) by mouth 2 times daily   Quantity:  14 tablet   Refills:  0            Where to get your medicines      These medications were sent to [facility] [place] [address]     Phone:  [phone]     ciprofloxacin 500 MG tablet                Primary Care Provider Office Phone # Fax #    Joy ROSA Ashton [phone]       [place] [id number]        Equal Access to Services     JACOB VICENTE AH: Hadii wendy Harrington, waaxda luqadaha, qaybta kaalmada rachel, isidro fritz. So [facility] [phone].    ATENCIÓN: Si habla español, tiene a willis disposición servicios gratuitos de asistencia lingüística. [place]  al [phone].    We comply with applicable federal civil rights laws and [place] laws. We do not discriminate on the basis of race, color, national origin, age, disability sex, sexual orientation or gender identity.            Thank you!     Thank you for choosing [facility]  for your care. Our goal is always to provide you with excellent care. Hearing back from our patients is one way we can continue to improve our services. Please take a few minutes to complete the written survey that you may receive in the mail after your visit with us. Thank you!             Your Updated Medication List - Protect others around you: Learn how to safely use, store and throw away your medicines at www.disposemymeds.org.          This list is accurate as of: 7/26/17  2:11 PM.  Always use your most recent med list.                   Brand Name Dispense Instructions for use Diagnosis    acetaminophen 325 MG tablet    TYLENOL    100 tablet    650 BID and BID PRN    Primary osteoarthritis of left shoulder       allopurinol 100 MG tablet    ZYLOPRIM    90 tablet    Take 1 tablet (100 mg) by mouth daily    Gouty arthropathy       ALOE PO      Take 2 capsules by mouth daily        aspirin 81 MG EC tablet      Take 1 tablet (81 mg) by mouth daily    CAD (coronary artery disease)       atorvastatin 20 MG tablet    LIPITOR    90 tablet    Take 1 tablet (20 mg) by mouth daily    Hyperlipidemia LDL goal <100       ciprofloxacin 500 MG tablet    CIPRO    14 tablet    Take 1 tablet (500 mg) by mouth 2 times daily    Dysuria       GLUCOSAMINE SULFATE PO      Take 1 tablet by mouth 2 times daily        levothyroxine 75 MCG tablet    SYNTHROID/LEVOTHROID    90 tablet    Take 1 tablet (75 mcg) by mouth daily    Acquired hypothyroidism       multivitamin, therapeutic with minerals Tabs tablet     100 tablet    Take 1 tablet by mouth daily    Anemia, unspecified anemia type       nitroGLYcerin 0.4 MG sublingual tablet     NITROSTAT    60 tablet    Place 1 tablet (0.4 mg) under the tongue every 5 minutes as needed for chest pain    CAD (coronary artery disease)       nystatin 422713 UNIT/GM Powd    MYCOSTATIN    60 g    Apply to area where you get a yeast infection daily    Tinea corporis

## 2017-07-26 NOTE — NURSING NOTE
"Chief Complaint   Patient presents with     Dysuria       Initial /77  Pulse 76  Temp 98.8  F (37.1  C) (Tympanic)  Wt 223 lb (101.2 kg)  LMP 01/01/1977  BMI 39.5 kg/m2 Estimated body mass index is 39.5 kg/(m^2) as calculated from the following:    Height as of 7/18/17: 5' 3\" (1.6 m).    Weight as of this encounter: 223 lb (101.2 kg).  Medication Reconciliation: complete    Health Maintenance that is potentially due pending provider review:  NONE    n/a    Is there anyone who you would like to be able to receive your results? No  If yes have patient fill out CLEO      "

## 2017-07-26 NOTE — PROGRESS NOTES
SUBJECTIVE:                                                    Elly Choe is a 81 year old female who presents to clinic today for the following health issues:  Comes today with recurrent UTI.      URINARY TRACT SYMPTOMS      Duration: hasn't been completely gone    Description  dysuria, frequency and urgency    Intensity:  severe    Accompanying signs and symptoms:  Fever/chills: no   Flank pain no   Nausea and vomiting: no   Vaginal symptoms: itching  Abdominal/Pelvic Pain: YES- abdominal pain    History  History of frequent UTI's: YES  History of kidney stones: no   Sexually Active: no   Possibility of pregnancy: No    Precipitating or alleviating factors: None    Therapies tried and outcome: course of antibiotics - keflex   Outcome: didn't feel like symptoms every went away            Problem list and histories reviewed & adjusted, as indicated.  Additional history: as documented    Patient Active Problem List   Diagnosis     Gastroesophageal reflux disease without esophagitis     Hemangioma     Benign essential hypertension     Diverticulitis of colon     surgical menopause (SARAH BSO) for non-cancerous reasons     Morbid obesity due to excess calories (H)     Idiopathic chronic gout of multiple sites without tophus     Chronic airway obstruction (H)     Acute dermatitis due to solar radiation     Hyperlipidemia LDL goal <100     CKD (chronic kidney disease) stage 3, GFR 30-59 ml/min     Advanced directives, counseling/discussion     Acquired hypothyroidism     DJD of shoulder     Phlebitis and thrombophlebitis of superficial vessels of lower extremities     Status post shoulder joint replacement     Anemia     Primary localized osteoarthrosis, lower leg     S/P knee replacement left     Generalized anxiety disorder     Health Care Home     Coronary artery disease involving native coronary artery of native heart without angina pectoris     S/P PTCA (percutaneous transluminal coronary angioplasty)      FH: rheumatoid arthritis     Osteopenia     Status post total shoulder arthroplasty, left     Past Surgical History:   Procedure Laterality Date     ARTHROPLASTY KNEE  4/13/2011    Procedure:ARTHROPLASTY KNEE; Surgeon:SEN PARRY; Location:[facility]     ARTHROPLASTY KNEE  12/26/2012    Procedure: ARTHROPLASTY KNEE;  Left Total Knee Arthroplasty;  Surgeon: Sen Parry MD;  Location: [facility]     ARTHROPLASTY SHOULDER  9/26/2012    Procedure: ARTHROPLASTY SHOULDER;  Right total shoulder arthroplasty;  Surgeon: Sen Parry MD;  Location: [facility]     ARTHROPLASTY SHOULDER Left 5/4/2016    Procedure: ARTHROPLASTY SHOULDER;  Surgeon: Sen Parry MD;  Location: [facility]     ARTHROSCOPY KNEE IRRIGATION AND DEBRIDEMENT  12/10/2010    ARTHROSCOPY KNEE IRRIGATION AND DEBRIDEMENT performed by LEY, JEFFREY DUANE at [facility] OR     CATARACT IOL, RT/LT  4/2010    bilateral     COLONOSCOPY  2002     COLONOSCOPY  6/19/2014    Procedure: COLONOSCOPY;  Surgeon: Steve Deng MD;  Location: [facility]     CORONARY ANGIOGRAPHY ADULT ORDER       HYSTERECTOMY, SARAH  1976    Total abdominal hysterectomy & bilateral salpingectomy oophorectomy     TONSILLECTOMY & ADENOIDECTOMY      as child       Social History   Substance Use Topics     Smoking status: Former Smoker     Quit date: 1/1/1970     Smokeless tobacco: Never Used      Comment: 20 year hx of smoking 3 packs of cigarettes daily; quit 1970     Alcohol use No     Family History   Problem Relation Age of Onset     Alcohol/Drug Father      DIABETES Brother      DIABETES Brother      Arthritis Mother 20     Other - See Comments Daughter      Fallopian tube infection 06/2015         Current Outpatient Prescriptions   Medication Sig Dispense Refill     ciprofloxacin (CIPRO) 500 MG tablet Take 1 tablet (500 mg) by mouth 2 times daily 14 tablet 0     levothyroxine (SYNTHROID/LEVOTHROID) 75 MCG tablet Take 1 tablet (75 mcg) by mouth daily 90 tablet 3     allopurinol (ZYLOPRIM) 100 MG  tablet Take 1 tablet (100 mg) by mouth daily 90 tablet 3     GLUCOSAMINE SULFATE PO Take 1 tablet by mouth 2 times daily        nystatin (MYCOSTATIN) 270738 UNIT/GM POWD Apply to area where you get a yeast infection daily 60 g 1     acetaminophen (TYLENOL) 325 MG tablet 650 BID and BID  tablet      atorvastatin (LIPITOR) 20 MG tablet Take 1 tablet (20 mg) by mouth daily 90 tablet 1     ALOE PO Take 2 capsules by mouth daily        multivitamin, therapeutic with minerals (MULTI-VITAMIN) TABS Take 1 tablet by mouth daily 100 tablet 3     aspirin EC 81 MG EC tablet Take 1 tablet (81 mg) by mouth daily       nitroglycerin (NITROSTAT) 0.4 MG SL tablet Place 1 tablet (0.4 mg) under the tongue every 5 minutes as needed for chest pain 60 tablet 1     Allergies   Allergen Reactions     Sulfa Drugs Other (See Comments)     Causes burning when urinates. Causes yeast infections.     Percocet [Oxycodone-Acetaminophen] Other (See Comments)     Freaked out and broke a toilet at [facility]         Reviewed and updated as needed this visit by clinical staffTobacco  Allergies  Med Hx  Surg Hx  Fam Hx  Soc Hx      Reviewed and updated as needed this visit by Provider         ROS:  C: NEGATIVE for fever, chills, change in weight  E/M: NEGATIVE for ear, mouth and throat problems  R: NEGATIVE for significant cough or SOB  CV: NEGATIVE for chest pain, palpitations or peripheral edema    OBJECTIVE:     /77  Pulse 76  Temp 98.8  F (37.1  C) (Tympanic)  Wt 223 lb (101.2 kg)  LMP 01/01/1977  BMI 39.5 kg/m2  Body mass index is 39.5 kg/(m^2).  GENERAL: healthy, alert and no distress  NECK: no adenopathy, no asymmetry, masses, or scars and thyroid normal to palpation  RESP: lungs clear to auscultation - no rales, rhonchi or wheezes  CV: regular rate and rhythm, normal S1 S2, no S3 or S4, no murmur, click or rub, no peripheral edema and peripheral pulses strong  ABDOMEN: soft, nontender, no hepatosplenomegaly, no masses  and bowel sounds normal  MS: no gross musculoskeletal defects noted, no edema        ASSESSMENT/PLAN:     ASSESSMENT/PLAN:      ICD-10-CM    1. Dysuria R30.0 *UA reflex to Microscopic and Culture ([facility] (except [place] and [facility])     Urine Microscopic     ciprofloxacin (CIPRO) 500 MG tablet       Patient Instructions   1. Looks like a recurrent UTI    2. Lets change antibiotics    3. Call if not better.    4. Lets think about a follow up Urine study.                       Sen Anaya MD  [facility]

## 2017-07-26 NOTE — PATIENT INSTRUCTIONS
1. Looks like a recurrent UTI    2. Lets change antibiotics    3. Call if not better.    4. Lets think about a follow up Urine study.

## 2017-08-03 DIAGNOSIS — R30.0 DYSURIA: ICD-10-CM

## 2017-08-03 LAB
ALBUMIN UR-MCNC: NEGATIVE MG/DL
APPEARANCE UR: CLEAR
BILIRUB UR QL STRIP: NEGATIVE
COLOR UR AUTO: YELLOW
GLUCOSE UR STRIP-MCNC: NEGATIVE MG/DL
HGB UR QL STRIP: NEGATIVE
KETONES UR STRIP-MCNC: NEGATIVE MG/DL
LEUKOCYTE ESTERASE UR QL STRIP: NEGATIVE
NITRATE UR QL: NEGATIVE
PH UR STRIP: 5.5 PH (ref 5–7)
SP GR UR STRIP: 1.01 (ref 1–1.03)
URN SPEC COLLECT METH UR: NORMAL
UROBILINOGEN UR STRIP-ACNC: 0.2 EU/DL (ref 0.2–1)

## 2017-08-03 PROCEDURE — 81003 URINALYSIS AUTO W/O SCOPE: CPT | Performed by: FAMILY MEDICINE

## 2017-10-01 ENCOUNTER — HOSPITAL ENCOUNTER (EMERGENCY)
Facility: CLINIC | Age: 82
Discharge: HOME | End: 2017-10-01
Attending: FAMILY MEDICINE | Admitting: FAMILY MEDICINE
Payer: COMMERCIAL

## 2017-10-01 ENCOUNTER — APPOINTMENT (OUTPATIENT)
Dept: GENERAL RADIOLOGY | Facility: CLINIC | Age: 82
End: 2017-10-01
Attending: FAMILY MEDICINE
Payer: COMMERCIAL

## 2017-10-01 VITALS — OXYGEN SATURATION: 96 % | TEMPERATURE: 98.7 F | SYSTOLIC BLOOD PRESSURE: 137 MMHG | DIASTOLIC BLOOD PRESSURE: 79 MMHG

## 2017-10-01 DIAGNOSIS — S70.01XA CONTUSION OF RIGHT HIP, INITIAL ENCOUNTER: ICD-10-CM
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
## 2017-10-01 DIAGNOSIS — S46.912A STRAIN OF LEFT SHOULDER, INITIAL ENCOUNTER: ICD-10-CM

## 2017-10-01 PROCEDURE — 99283 EMERGENCY DEPT VISIT LOW MDM: CPT

## 2017-10-01 PROCEDURE — 99283 EMERGENCY DEPT VISIT LOW MDM: CPT | Performed by: FAMILY MEDICINE

## 2017-10-01 PROCEDURE — 73502 X-RAY EXAM HIP UNI 2-3 VIEWS: CPT

## 2017-10-01 PROCEDURE — 25000132 ZZH RX MED GY IP 250 OP 250 PS 637: Performed by: FAMILY MEDICINE

## 2017-10-01 RX ADMIN — IBUPROFEN 600 MG: 400 TABLET ORAL at 11:47

## 2017-10-01 NOTE — ED AVS SNAPSHOT
[facility]    [address] [address]    Phone:  [phone]    Fax:  [phone]                                       Elly Choe   MRN: [id number]    Department:  [facility]   Date of Visit:  10/1/2017           Patient Information     Date Of Birth          1935        Your diagnoses for this visit were:     Fall, initial encounter     Strain of left shoulder, initial encounter     Contusion of right hip, initial encounter        You were seen by Joesph Flowers MD.        Discharge Instructions       You may use acetaminophen 1000 mg 4 times per day if needed for pain.  You may add ibuprofen 400 mg 4 times per day if needed for pain.  Return to be seen if new concerning symptoms such as increased pain, radiating pain, weakness or numbness.    24 Hour Appointment Hotline       To make an appointment at any [facility] clinic, call [phone] ([phone]). If you don't have a family doctor or clinic, we will help you find one. [facility] clinics are conveniently located to serve the needs of you and your family.             Review of your medicines      Our records show that you are taking the medicines listed below. If these are incorrect, please call your family doctor or clinic.        Dose / Directions Last dose taken    acetaminophen 325 MG tablet   Commonly known as:  TYLENOL   Quantity:  100 tablet        650 BID and BID PRN   Refills:  0        allopurinol 100 MG tablet   Commonly known as:  ZYLOPRIM   Dose:  100 mg   Quantity:  90 tablet        Take 1 tablet (100 mg) by mouth daily   Refills:  3        aspirin 81 MG EC tablet   Dose:  81 mg        Take 1 tablet (81 mg) by mouth daily   Refills:  0        GLUCOSAMINE-CHONDROITIN PO   Dose:  1 tablet        Take 1 tablet by mouth 2 times daily   Refills:  0        levothyroxine 75 MCG tablet   Commonly known as:  SYNTHROID/LEVOTHROID   Dose:  75 mcg   Quantity:  90  tablet        Take 1 tablet (75 mcg) by mouth daily   Refills:  3        multivitamin, therapeutic with minerals Tabs tablet   Dose:  1 tablet   Quantity:  100 tablet        Take 1 tablet by mouth daily   Refills:  3        nitroGLYcerin 0.4 MG sublingual tablet   Commonly known as:  NITROSTAT   Dose:  0.4 mg   Quantity:  60 tablet        Place 1 tablet (0.4 mg) under the tongue every 5 minutes as needed for chest pain   Refills:  1        nystatin 516751 UNIT/GM Powd   Commonly known as:  MYCOSTATIN   Quantity:  60 g        Apply to area where you get a yeast infection daily   Refills:  1                Procedures and tests performed during your visit     Pelvis XR w/ unilateral hip right      Orders Needing Specimen Collection     None      Pending Results     No orders found from 9/29/2017 to 10/2/2017.            Pending Culture Results     No orders found from 9/29/2017 to 10/2/2017.            Pending Results Instructions     If you had any lab results that were not finalized at the time of your Discharge, you can call the ED Lab Result RN at [phone]. You will be contacted by this team for any positive Lab results or changes in treatment. The nurses are available 7 days a week from 10A to 6:30P.  You can leave a message 24 hours per day and they will return your call.        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  Right hip joint alignment is normal. There is no evidence for  fracture.    PAOLA COLEMAN MD                Thank you for choosing [place]       Thank you  "for choosing [facility] for your care. Our goal is always to provide you with excellent care. Hearing back from our patients is one way we can continue to improve our services. Please take a few minutes to complete the written survey that you may receive in the mail after you visit with us. Thank you!        [employer] Information     [employer] lets you send messages to your doctor, view your test results, renew your prescriptions, schedule appointments and more. To sign up, go to [email or web address] . Click on \"Log in\" on the left side of the screen, which will take you to the Welcome page. Then click on \"Sign up Now\" on the right side of the page.     You will be asked to enter the access code listed below, as well as some personal information. Please follow the directions to create your username and password.     Your access code is: [email or web address]  Expires: 2017 12:27 PM     Your access code will  in 90 days. If you need help or a new code, please call your [facility] clinic or [phone].        Care EveryWhere ID     This is your Care EveryWhere ID. This could be used by other organizations to access your [facility] medical records  [id number]        Equal Access to Services     MASON VICENTE : Gianfranco Harrington, alejandro zhang, evan ramos, isidro fritz. So [facility] [phone].    ATENCIÓN: Si habla español, tiene a willis disposición servicios gratuitos de asistencia lingüística. Llame al [phone].    We comply with applicable federal civil rights laws and [place] laws. We do not discriminate on the basis of race, color, national origin, age, disability, sex, sexual orientation, or gender identity.            After Visit Summary       This is your record. Keep this with you and show to your community pharmacist(s) and doctor(s) at your next visit.                  "

## 2017-10-01 NOTE — ED NOTES
Pt tripped and fell on buttocks.  Having some pain in R hip, able to stand without difficulty.  Feels like she landed on R buttock.  Not having significant pain, but is able to feel discomfort at times.  Has had surgery on hip, wants to make sure she didn't fracture anything.  Denies any neck or back pain from fall.  No pain with palpation.  CMS intact to R foot/leg.  Alert and oriented.

## 2017-10-01 NOTE — ED AVS SNAPSHOT
[facility]    [address] [address]    Phone:  [phone]    Fax:  [phone]                                       Elly Choe   MRN: [id number]    Department:  [facility]   Date of Visit:  10/1/2017           After Visit Summary Signature Page     I have received my discharge instructions, and my questions have been answered. I have discussed any challenges I see with this plan with the nurse or doctor.    ..........................................................................................................................................  Patient/Patient Representative Signature      ..........................................................................................................................................  Patient Representative Print Name and Relationship to Patient    ..................................................               ................................................  Date                                            Time    ..........................................................................................................................................  Reviewed by Signature/Title    ...................................................              ..............................................  Date                                                            Time

## 2017-10-01 NOTE — ED PROVIDER NOTES
History     Chief Complaint   Patient presents with     Fall     Pt tripped and fell on buttocks.  Having some pain in R hip, able to stand without difficulty.     HPI  Elly Choe is a 81 year old female with history of osteopenia, rheumatoid arthritis, a bilateral arthroplasty in both shoulders and both knees who presents to the ED with right hip pain from a fall. Patient was trying to get off the couch this morning when he tripped over the blanket she was carrying and she fell on her right hip. Patient was not able to get get up so slide on the floor to the phone.  She had a steroid injection by Dr. Parry due having for osteoarthritis in the right hip one month ago. Patient had been able to ambulate since fall.  She called paramedics and they assisted her up and brought her in here to be checked out.  Patient has minor back pain and chronic neck pain but no new neck symptoms.  She denies hitting her head and denies other injuries.    I have reviewed the Medications, Allergies, Past Medical and Surgical History, and Social History in the Epic system.    Review of Systems   Further problem focused review negative.  Hip pain  Back Pain    Physical Exam   BP: (!) 176/91  Temp: 98.7  F (37.1  C)  SpO2: 95 %  Physical Exam   Nursing note and vitals were reviewed.  Constitutional: Awake and alert, obese but otherwise healthy appearing 81-year-old in no apparent discomfort, who does not appear acutely ill, and who answers questions appropriately and cooperates with examination.  HEENT: Atraumatic and normocephalic EOMI.  Neck: Freely mobile.  Cardiovascular: Cardiac examination reveals normal heart rate and regular rhythm without murmur.  Pulmonary/Chest: Breathing is unlabored.  Breath sounds are clear and equal bilaterally.  There no retractions, tachypnea, rales, wheezes, or rhonchi.  Abdomen: Soft, nontender, no HSM or masses rebound or guarding.  Musculoskeletal: Extremities are warm and well-perfused  and without edema.  I'm able to move her right and left hips through a full range of motion with mild discomfort.  No tenderness to palpation or percussion over the cervical or lumbar spines.  She moves other extremities freely without discomfort.  Neurological: Alert, oriented, thought content logical, coherent   Skin: Warm, dry, no rashes.  Psychiatric: Affect broad and appropriate.        ED Course     ED Course     Procedures             Critical Care time:  none               Labs Ordered and Resulted from Time of ED Arrival Up to the Time of Departure from the ED - No data to display     Results for orders placed or performed during the hospital encounter of 10/01/17 (from the past 24 hour(s))   Pelvis XR w/ unilateral hip right    Narrative    PELVIS AND HIP RIGHT ONE VIEW 10/1/2017 10:45 AM     COMPARISON: None    HISTORY: Fall onto right hip; hip pain      Impression    IMPRESSION: There is moderate osteoarthritic degenerative change in  the right hip joint including severe loss of joint space width,  marginal osteophytic spur formation on both the femoral head and  acetabulum, subchondral sclerosis and subchondral cyst formation.  Right hip joint alignment is normal. There is no evidence for  fracture.       Medications   ibuprofen (ADVIL/MOTRIN) tablet 600 mg (600 mg Oral Given 10/1/17 1147)       10:10 AM Patient assessed. Patient care outlined.     Assessments & Plan (with Medical Decision Making)     81-year-old female presents after a fall as above.  She fell onto her right hip and has right hip pain.  Imaging shows no evidence of fracture or dislocation.  Physical examination is reassuring with normal motion of the hip and she has been able to ambulate.  Later in the visit she tells me that her shoulder starting to hurt on the left side.  She said that the paramedics had tried to lift her up from that shoulder and she thinks she may have strained it.  I examined her shoulder and we are able to move  it through a limited range of motion which is not changed compared to her previous restricted range of motion since her arthroplasty.  There was no evidence of dislocation or serious injury on exam.  I suspect a sprain of the shoulder.  We discussed that she may find other soft tissue injuries that are not apparent today.  She will manage her symptoms with Tylenol and ibuprofen.  She will return if she develops any new concerning symptoms such as difficulty control pain, weakness or numbness in an extremity or other concerning symptoms.    I have reviewed the nursing notes.    I have reviewed the findings, diagnosis, plan and need for follow up with the patient.       New Prescriptions    No medications on file       Final diagnoses:   Fall, initial encounter   Strain of left shoulder, initial encounter   Contusion of right hip, initial encounter     This document serves as a record of the services and decisions personally performed and made by Joesph Flowers MD. It was created on HIS/HER behalf by   Paula Mcfadden, a trained medical scribe. The creation of this document is based the provider's statements to the medical scribe.  Paula Mcfadden 10:19 AM 10/1/2017    Provider:   The information in this document, created by the medical scribe for me, accurately reflects the services I personally performed and the decisions made by me. I have reviewed and approved this document for accuracy prior to leaving the patient care area.  Joesph Flowers MD 10:19 AM 10/1/2017    10/1/2017   [facility] EMERGENCY DEPARTMENT     Joesph Flowers MD  10/01/17 1300

## 2017-10-01 NOTE — DISCHARGE INSTRUCTIONS
You may use acetaminophen 1000 mg 4 times per day if needed for pain.  You may add ibuprofen 400 mg 4 times per day if needed for pain.  Return to be seen if new concerning symptoms such as increased pain, radiating pain, weakness or numbness.

## 2017-10-25 ENCOUNTER — TRANSFERRED RECORDS (OUTPATIENT)
Dept: HEALTH INFORMATION MANAGEMENT | Facility: CLINIC | Age: 82
End: 2017-10-25

## 2017-10-27 ENCOUNTER — HOSPITAL ENCOUNTER (EMERGENCY)
Facility: CLINIC | Age: 82
Discharge: HOME | End: 2017-10-27
Attending: EMERGENCY MEDICINE | Admitting: EMERGENCY MEDICINE
Payer: COMMERCIAL

## 2017-10-27 ENCOUNTER — APPOINTMENT (OUTPATIENT)
Dept: GENERAL RADIOLOGY | Facility: CLINIC | Age: 82
End: 2017-10-27
Attending: EMERGENCY MEDICINE
Payer: COMMERCIAL

## 2017-10-27 ENCOUNTER — APPOINTMENT (OUTPATIENT)
Dept: CT IMAGING | Facility: CLINIC | Age: 82
End: 2017-10-27
Attending: EMERGENCY MEDICINE
Payer: COMMERCIAL

## 2017-10-27 VITALS
RESPIRATION RATE: 18 BRPM | HEART RATE: 71 BPM | TEMPERATURE: 98.9 F | DIASTOLIC BLOOD PRESSURE: 72 MMHG | SYSTOLIC BLOOD PRESSURE: 134 MMHG | OXYGEN SATURATION: 95 %

## 2017-10-27 DIAGNOSIS — E87.1 HYPONATREMIA: ICD-10-CM

## 2017-10-27 DIAGNOSIS — M54.50 LOW BACK PAIN WITHOUT SCIATICA, UNSPECIFIED BACK PAIN LATERALITY, UNSPECIFIED CHRONICITY: ICD-10-CM

## 2017-10-27 DIAGNOSIS — N39.0 URINARY TRACT INFECTION WITHOUT HEMATURIA, SITE UNSPECIFIED: ICD-10-CM

## 2017-10-27 DIAGNOSIS — N39.0 ACUTE UTI: ICD-10-CM

## 2017-10-27 DIAGNOSIS — M54.50 ACUTE RIGHT-SIDED LOW BACK PAIN WITHOUT SCIATICA: ICD-10-CM

## 2017-10-27 DIAGNOSIS — R10.9 RIGHT FLANK PAIN: ICD-10-CM

## 2017-10-27 LAB
ALBUMIN UR-MCNC: NEGATIVE MG/DL
ANION GAP SERPL CALCULATED.3IONS-SCNC: 7 MMOL/L (ref 3–14)
APPEARANCE UR: CLEAR
BACTERIA #/AREA URNS HPF: ABNORMAL /HPF
BASOPHILS # BLD AUTO: 0 10E9/L (ref 0–0.2)
BASOPHILS NFR BLD AUTO: 0.8 %
BILIRUB UR QL STRIP: NEGATIVE
BUN SERPL-MCNC: 13 MG/DL (ref 7–30)
CALCIUM SERPL-MCNC: 8.4 MG/DL (ref 8.5–10.1)
CHLORIDE SERPL-SCNC: 95 MMOL/L (ref 94–109)
CO2 SERPL-SCNC: 27 MMOL/L (ref 20–32)
COLOR UR AUTO: YELLOW
CREAT SERPL-MCNC: 1.17 MG/DL (ref 0.52–1.04)
DIFFERENTIAL METHOD BLD: NORMAL
EOSINOPHIL # BLD AUTO: 0.2 10E9/L (ref 0–0.7)
EOSINOPHIL NFR BLD AUTO: 3.4 %
ERYTHROCYTE [DISTWIDTH] IN BLOOD BY AUTOMATED COUNT: 12.3 % (ref 10–15)
GFR SERPL CREATININE-BSD FRML MDRD: 44 ML/MIN/1.7M2
GLUCOSE SERPL-MCNC: 95 MG/DL (ref 70–99)
GLUCOSE UR STRIP-MCNC: NEGATIVE MG/DL
HCT VFR BLD AUTO: 40.6 % (ref 35–47)
HGB BLD-MCNC: 14.7 G/DL (ref 11.7–15.7)
HGB UR QL STRIP: NEGATIVE
IMM GRANULOCYTES # BLD: 0.1 10E9/L (ref 0–0.4)
IMM GRANULOCYTES NFR BLD: 1.3 %
KETONES UR STRIP-MCNC: NEGATIVE MG/DL
LEUKOCYTE ESTERASE UR QL STRIP: ABNORMAL
LYMPHOCYTES # BLD AUTO: 1.1 10E9/L (ref 0.8–5.3)
LYMPHOCYTES NFR BLD AUTO: 21.6 %
MCH RBC QN AUTO: 32.6 PG (ref 26.5–33)
MCHC RBC AUTO-ENTMCNC: 36.2 G/DL (ref 31.5–36.5)
MCV RBC AUTO: 90 FL (ref 78–100)
MONOCYTES # BLD AUTO: 0.5 10E9/L (ref 0–1.3)
MONOCYTES NFR BLD AUTO: 8.6 %
NEUTROPHILS # BLD AUTO: 3.4 10E9/L (ref 1.6–8.3)
NEUTROPHILS NFR BLD AUTO: 64.3 %
NITRATE UR QL: NEGATIVE
PH UR STRIP: 7.5 PH (ref 5–7)
PLATELET # BLD AUTO: 176 10E9/L (ref 150–450)
POTASSIUM SERPL-SCNC: 4.2 MMOL/L (ref 3.4–5.3)
RBC # BLD AUTO: 4.51 10E12/L (ref 3.8–5.2)
RBC #/AREA URNS AUTO: 0 /HPF (ref 0–2)
SODIUM SERPL-SCNC: 129 MMOL/L (ref 133–144)
SOURCE: ABNORMAL
SP GR UR STRIP: 1 (ref 1–1.03)
SQUAMOUS #/AREA URNS AUTO: 2 /HPF (ref 0–1)
TRANS CELLS #/AREA URNS HPF: <1 /HPF (ref 0–1)
UROBILINOGEN UR STRIP-MCNC: NORMAL MG/DL (ref 0–2)
WBC # BLD AUTO: 5.2 10E9/L (ref 4–11)
WBC #/AREA URNS AUTO: 7 /HPF (ref 0–2)

## 2017-10-27 PROCEDURE — 80048 BASIC METABOLIC PNL TOTAL CA: CPT | Performed by: EMERGENCY MEDICINE

## 2017-10-27 PROCEDURE — 81001 URINALYSIS AUTO W/SCOPE: CPT | Performed by: EMERGENCY MEDICINE

## 2017-10-27 PROCEDURE — 85025 COMPLETE CBC W/AUTO DIFF WBC: CPT | Performed by: EMERGENCY MEDICINE

## 2017-10-27 PROCEDURE — 99285 EMERGENCY DEPT VISIT HI MDM: CPT | Mod: Z6 | Performed by: EMERGENCY MEDICINE

## 2017-10-27 PROCEDURE — 72100 X-RAY EXAM L-S SPINE 2/3 VWS: CPT

## 2017-10-27 PROCEDURE — 99285 EMERGENCY DEPT VISIT HI MDM: CPT | Mod: 25 | Performed by: EMERGENCY MEDICINE

## 2017-10-27 PROCEDURE — 74176 CT ABD & PELVIS W/O CONTRAST: CPT

## 2017-10-27 PROCEDURE — 96374 THER/PROPH/DIAG INJ IV PUSH: CPT | Performed by: EMERGENCY MEDICINE

## 2017-10-27 PROCEDURE — 96361 HYDRATE IV INFUSION ADD-ON: CPT | Performed by: EMERGENCY MEDICINE

## 2017-10-27 PROCEDURE — 25000128 H RX IP 250 OP 636: Performed by: EMERGENCY MEDICINE

## 2017-10-27 PROCEDURE — 71020 XR CHEST 2 VW: CPT

## 2017-10-27 PROCEDURE — 87086 URINE CULTURE/COLONY COUNT: CPT | Performed by: EMERGENCY MEDICINE

## 2017-10-27 PROCEDURE — 25000132 ZZH RX MED GY IP 250 OP 250 PS 637: Performed by: EMERGENCY MEDICINE

## 2017-10-27 RX ORDER — FENTANYL CITRATE 50 UG/ML
25 INJECTION, SOLUTION INTRAMUSCULAR; INTRAVENOUS ONCE
Status: COMPLETED | OUTPATIENT
Start: 2017-10-27 | End: 2017-10-27

## 2017-10-27 RX ORDER — ACETAMINOPHEN 325 MG/1
650 TABLET ORAL ONCE
Status: COMPLETED | OUTPATIENT
Start: 2017-10-27 | End: 2017-10-27

## 2017-10-27 RX ORDER — CIPROFLOXACIN 250 MG/1
250 TABLET, FILM COATED ORAL 2 TIMES DAILY
Qty: 10 TABLET | Refills: 0 | Status: SHIPPED | OUTPATIENT
Start: 2017-10-27 | End: 2017-12-19

## 2017-10-27 RX ADMIN — FENTANYL CITRATE 25 MCG: 50 INJECTION INTRAMUSCULAR; INTRAVENOUS at 13:43

## 2017-10-27 RX ADMIN — SODIUM CHLORIDE 500 ML: 9 INJECTION, SOLUTION INTRAVENOUS at 13:45

## 2017-10-27 RX ADMIN — ACETAMINOPHEN 650 MG: 325 TABLET, FILM COATED ORAL at 10:15

## 2017-10-27 NOTE — ED PROVIDER NOTES
History     Chief Complaint   Patient presents with     Back Pain     Pt has been having back pain for the past 2 weeks, off and on.  Pain is sometimes sharp, 10/10 pain.       HPI  Elly Cheo is a 81 year old female who presents emergency Department complaining of right-sided back and flank pain.  Patient states symptoms have been going on intermittently for the past few weeks but over the past few days has had increasing pain in the right flank and lower lumbar spine.  Pain is worsened with movement and is not terrible when she sitting down it does radiate up into her upper back.  She denies any focal numbness or weakness in any extremity.  She has had not had any bowel or bladder dysfunction.  Patient has a history arthritis and has had bilateral hip replacement and bilateral knee replacement.  She denies any recent falls or heavy lifting.  She currently rates her pain at 2 out of 10 worsened with activity.    Problem List:    Patient Active Problem List    Diagnosis Date Noted     Status post total shoulder arthroplasty, left 05/04/2016     Priority: Medium     Osteopenia 03/03/2015     Priority: Medium     Feb 2015 dexa with mostly mild osteopenia.  Repeat in 3-5 yrs.       FH: rheumatoid arthritis 02/12/2015     Priority: Medium     Negative RF, DEVON, CCP 3/26/13  Never tested, but mother had RA and Elly has classic hand deformities.  She does not want to hear about it.       Coronary artery disease involving native coronary artery of native heart without angina pectoris 06/18/2014     Priority: Medium     4/15/2014  Cardiac catheterization-2 drug eluting stents placed in left main artery, moderated disease in LAD and circumflex, mild RCA, normal EF  On Brilinta for one year (dcd 4/28/15)           S/P PTCA (percutaneous transluminal coronary angioplasty) 06/18/2014     Priority: Medium     Health Care Home 04/17/2014     Priority: Medium       Status:  No Services Needed  Care Coordinator:   Yaa Nelson    See Letters for [facility] Care Plan  Date:  February 9, 2016               Generalized anxiety disorder 09/08/2013     Priority: Medium     Diagnosis updated by automated process. Provider to review and confirm.       S/P knee replacement left 01/02/2013     Priority: Medium     Primary localized osteoarthrosis, lower leg 12/26/2012     Priority: Medium     Anemia 12/06/2012     Priority: Medium     Status post shoulder joint replacement 10/05/2012     Priority: Medium     Phlebitis and thrombophlebitis of superficial vessels of lower extremities 10/02/2012     Priority: Medium     DJD of shoulder 09/26/2012     Priority: Medium     Acquired hypothyroidism 09/12/2012     Priority: Medium     Advanced directives, counseling/discussion 04/12/2012     Priority: Medium     Advance Directive received and scanned. Click on Code in the patient header to view. 4/12/2012     DNI/DNR         CKD (chronic kidney disease) stage 3, GFR 30-59 ml/min 03/30/2011     Priority: Medium     Acute dermatitis due to solar radiation 09/09/2010     Priority: Medium     Idiopathic chronic gout of multiple sites without tophus 06/03/2007     Priority: Medium     Josselin 3, 2007: right great toe with elevated uric acid, started on -ALLOPURINOL 100 MG daily. recheck uric acid 2 week after starting medication and titate as needed.  Problem list name updated by automated process. Provider to review and confirm  [employer] Update utility       surgical menopause ([facility]) for non-cancerous reasons 01/08/2007     Priority: Medium     Age 41       Hemangioma 05/17/2005     Priority: Medium     Area on chest subcutaneous between breast tissue  Problem list name updated by automated process. Provider to review       Benign essential hypertension 05/17/2005     Priority: Medium     Diverticulitis of colon 05/17/2005     Priority: Medium     Problem list name updated by automated process. Provider to review       Hyperlipidemia LDL goal <100  10/31/2010     Priority: Low     Chronic airway obstruction (H) 01/13/2008     Priority: Low     9/22/2010:She has been told there is lung damage from smoking over the years but has not been limited by any respiratory symptoms.  Not using any inhalers.   Problem list name updated by automated process. Provider to review       Morbid obesity due to excess calories (H) 01/08/2007     Priority: Low     Problem list name updated by automated process. Provider to review       Gastroesophageal reflux disease without esophagitis 05/17/2005     Priority: Low        Past Medical History:    Past Medical History:   Diagnosis Date     ACS (acute coronary syndrome) (H) 4/14/2014     Anemia 12/6/2012     Arthritis      Chronic kidney disease, stage III (moderate) 10/2/2012     CKD (chronic kidney disease) stage 3, GFR 30-59 ml/min 3/30/2011     COPD (chronic obstructive pulmonary disease) (H)      Generalised anxiety disorder 9/8/2013     GERD 5/17/2005     HX OF COMPOUND HEMANGIOMA NOS [228.00]      HX OF SUPERFICIAL PHLEBITIS      Hyperlipidemia LDL goal <100 10/31/2010     Hypertension      Hypertension goal BP (blood pressure) < 140/90 5/17/2005     Hypothyroidism 9/12/2012     Obesity, unspecified 1/8/2007     Phlebitis and thrombophlebitis of superficial vessels of lower extremities 10/2/2012     Primary localized osteoarthrosis, lower leg 12/26/2012     S/P knee replacement left 1/2/2013     Thyroid disease      Unspecified hypothyroidism 10/2/2012       Past Surgical History:    Past Surgical History:   Procedure Laterality Date     ARTHROPLASTY KNEE  4/13/2011    Procedure:ARTHROPLASTY KNEE; Surgeon:SEN PARRY; Location:[facility]     ARTHROPLASTY KNEE  12/26/2012    Procedure: ARTHROPLASTY KNEE;  Left Total Knee Arthroplasty;  Surgeon: Sen Parry MD;  Location: [facility]     ARTHROPLASTY SHOULDER  9/26/2012    Procedure: ARTHROPLASTY SHOULDER;  Right total shoulder arthroplasty;  Surgeon: Sen Parry MD;   Location: [facility]     ARTHROPLASTY SHOULDER Left 5/4/2016    Procedure: ARTHROPLASTY SHOULDER;  Surgeon: Sen Parry MD;  Location: [facility]     ARTHROSCOPY KNEE IRRIGATION AND DEBRIDEMENT  12/10/2010    ARTHROSCOPY KNEE IRRIGATION AND DEBRIDEMENT performed by LEY, JEFFREY DUANE at [facility] OR     CATARACT IOL, RT/LT  4/2010    bilateral     COLONOSCOPY  2002     COLONOSCOPY  6/19/2014    Procedure: COLONOSCOPY;  Surgeon: Steve Deng MD;  Location: [facility]     CORONARY ANGIOGRAPHY ADULT ORDER       HYSTERECTOMY, SARAH  1976    Total abdominal hysterectomy & bilateral salpingectomy oophorectomy     TONSILLECTOMY & ADENOIDECTOMY      as child       Family History:    Family History   Problem Relation Age of Onset     Alcohol/Drug Father      DIABETES Brother      DIABETES Brother      Arthritis Mother 20     Other - See Comments Daughter      Fallopian tube infection 06/2015       Social History:  Marital Status:  Single [1]  Social History   Substance Use Topics     Smoking status: Former Smoker     Quit date: 1/1/1970     Smokeless tobacco: Never Used      Comment: 20 year hx of smoking 3 packs of cigarettes daily; quit 1970     Alcohol use No        Medications:      GLUCOSAMINE-CHONDROITIN PO   levothyroxine (SYNTHROID/LEVOTHROID) 75 MCG tablet   allopurinol (ZYLOPRIM) 100 MG tablet   nystatin (MYCOSTATIN) 475742 UNIT/GM POWD   acetaminophen (TYLENOL) 325 MG tablet   multivitamin, therapeutic with minerals (MULTI-VITAMIN) TABS   aspirin EC 81 MG EC tablet   nitroglycerin (NITROSTAT) 0.4 MG SL tablet         Review of Systems   Constitutional: Positive for activity change. Negative for appetite change, chills and fever.   HENT: Negative for congestion and trouble swallowing.    Eyes: Negative for visual disturbance.   Respiratory: Negative for chest tightness, shortness of breath and wheezing.    Cardiovascular: Negative for chest pain, palpitations and leg swelling.   Gastrointestinal: Negative for abdominal pain,  nausea and vomiting.   Genitourinary: Positive for flank pain. Negative for decreased urine volume, dysuria, hematuria and pelvic pain.   Musculoskeletal: Positive for back pain. Negative for gait problem and neck pain.   Skin: Negative for rash.   Neurological: Negative for dizziness, weakness, light-headedness, numbness and headaches.   Hematological: Does not bruise/bleed easily.   Psychiatric/Behavioral: Negative for confusion.       Physical Exam   BP: 189/78  Pulse: 71  Temp: 98.9  F (37.2  C)  Resp: 18  SpO2: 96 %      Physical Exam   Constitutional: She is oriented to person, place, and time. She appears well-developed and well-nourished. No distress.   HENT:   Head: Normocephalic.   Mouth/Throat: Oropharynx is clear and moist.   Eyes: Conjunctivae are normal.   Neck: Normal range of motion. Neck supple.   Cardiovascular: Normal rate, regular rhythm, normal heart sounds and intact distal pulses.    No murmur heard.  Pulmonary/Chest: Effort normal and breath sounds normal. She has no wheezes. She has no rales.   Abdominal: Soft. Bowel sounds are normal. There is no tenderness.   Musculoskeletal: Normal range of motion. She exhibits no edema.   There is tenderness to palpation of the lower lumbar spine and R flank region. Palpation reproduces the pain. No erythema or swelling is noted.    Neurological: She is alert and oriented to person, place, and time. No cranial nerve deficit. She exhibits normal muscle tone.   Skin: Skin is warm and dry.   Psychiatric: She has a normal mood and affect.   Nursing note and vitals reviewed.      ED Course     ED Course     Procedures               Critical Care time:  none            Labs Ordered and Resulted from Time of ED Arrival Up to the Time of Departure from the ED   BASIC METABOLIC PANEL - Abnormal; Notable for the following:        Result Value    Sodium 129 (*)     Creatinine 1.17 (*)     GFR Estimate 44 (*)     GFR Estimate If Black 54 (*)     Calcium 8.4 (*)      All other components within normal limits   URINE MACROSCOPIC WITH REFLEX TO MICRO - Abnormal; Notable for the following:     pH Urine 7.5 (*)     Leukocyte Esterase Urine Moderate (*)     WBC Urine 7 (*)     Bacteria Urine Few (*)     Squamous Epithelial /HPF Urine 2 (*)     All other components within normal limits   CBC WITH PLATELETS DIFFERENTIAL     Results for orders placed or performed during the hospital encounter of 10/27/17   Lumbar spine XR, 2-3 views    Narrative    XR LUMBAR SPINE 2-3 VIEWS   10/27/2017 11:01 AM     HISTORY: lower back pain    COMPARISON: Lumbar spine x-ray from 1/12/2009.      Impression    IMPRESSION: Advanced degenerative disc disease at L4-L5 and moderate  degenerative disc disease at L5-S1. Mild grade 1 degenerative  spondylolisthesis at L3-L4. Spondylolisthesis is new since the prior  study. Degenerative disc disease in the lower lumbar spine has  progressed since the prior study. No evidence of compression fracture.  Stable appearing aortic calcification.    DELIA MATTHEWS MD   Chest XR,  PA & LAT    Narrative    XR CHEST 2 VW   10/27/2017 11:03 AM     HISTORY: sob    COMPARISON: 4/8/2016 chest x-ray.      Impression    IMPRESSION: Heart size is normal. Atherosclerotic calcification of the  aorta. Pulmonary vasculature is normal. Lungs are clear. No pleural  fluid. No acute disease.    DELIA MATTHEWS MD   Abd/pelvis CT - no contrast - Stone Protocol    Narrative    CT ABDOMEN AND PELVIS WITHOUT CONTRAST  10/27/2017 2:11 PM     HISTORY: Right flank pain.    TECHNIQUE: Renal stone protocol without IV contrast.  Radiation dose  for this scan was reduced using automated exposure control, adjustment  of the mA and/or kV according to patient size, or iterative  reconstruction technique.    COMPARISON: None.    FINDINGS: Scans through the lung bases are unremarkable.    The noncontrast appearance of the liver is normal. Spleen and pancreas  are normal. No adrenal lesions.    No urinary  tract stones or hydronephrosis. Kidneys have an  unremarkable noncontrast appearance.    There are atherosclerotic changes of the aorta without evidence of  aneurysm. No adenopathy.    Scans through the pelvis demonstrate a normal-appearing bladder.    There are multiple sigmoid diverticula but no evidence of  diverticulitis. No evidence of appendicitis. There is no dilated  bowel, thickened bowel wall, free air, or free fluid.      Impression    IMPRESSION:  1. No urinary tract stones or hydronephrosis.   2. Multiple sigmoid diverticula but no evidence of diverticulitis. No  evidence of appendicitis. No acute findings.    DELIA MATTHEWS MD         Assessments & Plan (with Medical Decision Making)records were reviewed. Labs were obtained along with a lumbar xray. Patient was given tylenol for pain. Lumbar xray with advanced degenerative changes. No obvious fracture. White count was not elevated and patients hgb was 14.7. No L shift was present. Sodium was low at 129 and patients creatinine was baseline but elevated. UA with possible UTI. Due to patient R flank pain , A ct stone protocol was obtained. This revealed no obvious acute abnormality. Patient had been given a small amount of fentanyl with improvement of her pain. This appears to be a musculoskeletal problem but could be related to her possible UTI. Findings  Discussed with patient . She will be treated with a reduced dose of cipro and will continue with tylenol. She will return if symptoms worsen or new symptoms develop. She will reduce her water intake slightly over the next few days and have her sodium rechecked. She is in agreement with the plan.      I have reviewed the nursing notes.    I have reviewed the findings, diagnosis, plan and need for follow up with the patient.       New Prescriptions    No medications on file       Final diagnoses:   Right flank pain   Acute right-sided low back pain without sciatica   Acute UTI   Hyponatremia        10/27/2017   [facility] EMERGENCY DEPARTMENT     Warner Resendez MD  10/30/17 1107

## 2017-10-27 NOTE — DISCHARGE INSTRUCTIONS
return if symptoms worsen or new symptoms develop.  Take antibiotics as directed.  Drink a little less water for the next few days and have your sodium rechecked on Monday either by primary care or the emergency department.  Take Tylenol for pain.  If increased pain numbness weakness in any extremity fever altered mental status or other symptoms present please return for further evaluation and care.  Causes of Lumbar (Low Back) Pain  Low back pain can be caused by problems with any part of the lumbar spine. A disk can herniate (push out) and press on a nerve. Vertebrae can rub against each other or slip out of place. This can irritate facet joints and nerves. It can also lead to stenosis, a narrowing of the spinal canal or foramen.  Pressure from a disk  Constant wear and tear on a disk can cause it to weaken and push outward. Part of the disk may then press on nearby nerves. There are two common types of herniated disks:  Contained means the soft nucleus is protruding outward.   Extruded means the firm annulus has torn, letting the soft center squeeze through.     Pressure from bone  An unstable spine   With age, a disk may thin and wear out. Vertebrae above and below the disk may begin to touch. This can put pressure on nerves. It can also cause bone spurs (growths) to form where the bones rub together.    Stenosis results when bone spurs narrow the foramen or spinal canal. This also puts pressure on nerves. Slipping vertebrae can irritate nerves and joints. They can also worsen stenosis.    In some cases, vertebrae become unstable and slip forward. This is called spondylolisthesis.     Date Last Reviewed: 10/12/2015    9073-2010 [employer]. [address] [place] [address], [place] [address]. All rights reserved. This information is not intended as a substitute for professional medical care. Always follow your healthcare professional's instructions.             * BLADDER INFECTION,Female (Adult)    A  "bladder infection (\"cystitis\" or \"UTI\") usually causes a constant urge to urinate and a burning when passing urine. Urine may be cloudy, smelly or dark. There may be pain in the lower abdomen. A bladder infection occurs when bacteria from the vaginal area enter the bladder opening (urethra). This can occur from sexual intercourse, wearing tight clothing, dehydration and other factors.  HOME CARE:  1. Drink lots of fluids (at least 6-8 glasses a day, unless you must restrict fluids for other medical reasons). This will force the medicine into your urinary system and flush the bacteria out of your body. Cranberry juice has been shown to help clear out the bacteria.  2. Avoid sexual intercourse until your symptoms are gone.  3. A bladder infection is treated with antibiotics. You may also be given Pyridium (generic = phenazopyridine) to reduce the burning sensation. This medicine will cause your urine to become a bright orange color. The orange urine may stain clothing. You may wear a pad or panty-liner to protect clothing.  PREVENTING FUTURE INFECTIONS:  1. Always wipe from front to back after a bowel movement.  2. Keep the genital area clean and dry.  3. Drink plenty of fluids each day to avoid dehydration.  4. Urinate right after intercourse to flush out the bladder.  5. Wear cotton underwear and cotton-lined panty hose; avoid tight-fitting pants.  6. If you are on birth control pills and are having frequent bladder infections, discuss with your doctor.  FOLLOW UP: Return to this facility or see your doctor if ALL symptoms are not gone after three days of treatment.  GET PROMPT MEDICAL ATTENTION if any of the following occur:    Fever over 101 F (38.3 C)    No improvement by the third day of treatment    Increasing back or abdominal pain    Repeated vomiting; unable to keep medicine down    Weakness, dizziness or fainting    Vaginal discharge    Pain, redness or swelling in the labia (outer vaginal area)    " 2079-1891 [employer]. [address] [place] [address], [place] [address]. All rights reserved. This information is not intended as a substitute for professional medical care. Always follow your healthcare professional's instructions.  This information has been modified by your health care provider with permission from the publisher.      Flank Pain, Uncertain Cause  The flank is the area between your upper abdomen and your back. Pain there is often caused by a problem with your kidneys. It might be a kidney infection or a kidney stone. Other causes of flank pain include spinal arthritis, a pinched nerve from a back injury, or a back muscle strain or spasm.  The cause of your flank pain is not certain. You may need other tests.  Home care  Follow these tips when caring for yourself at home:    You may use acetaminophen or ibuprofen to control pain, unless your health care provider prescribed another medicine. If you have chronic liver or kidney disease, talk with your provider before taking these medicines. Also talk with your provider first if you ve ever had a stomach ulcer or GI bleeding.    If the pain is coming from your muscles, you may get relief with ice or heat. During the first 2 days after the injury, put an ice pack on the painful area for 20 minutes every 2 to 4 hours. This will reduce swelling and pain. A hot shower, hot bath, or heating pad works well for a muscle spasm. You can start with ice, then switch to heat after 2 days. You might find that alternating ice and heat works well. Use the method that feels the best to you.  Follow-up care  Follow up with your healthcare provider if your symptoms don t get better over the next few days.  When to seek medical advice  Call your healthcare provider right away if any of these happen:    Repeated vomiting    Fever of 100.4 F (38 C) or higher, or as directed by your health care provider    Flank pain that gets worse    Pain that spreads to the front  of your belly (abdomen)    Dizziness, weakness, or fainting    Blood in your urine    Burning feeling when you urinate or the need to urinate often    Pain in one of your legs that gets worse    Numbness or weakness in a leg  Date Last Reviewed: 10/1/2016    1962-1424 [employer]. [address] [place] [address]. All rights reserved. This information is not intended as a substitute for professional medical care. Always follow your healthcare professional's instructions.          Hyponatremia  Hyponatremia means low sodium levels in the blood. This condition most often occurs after prolonged vomiting or diarrhea, which causes your body to lose too much water and sodium. It can also result from drinking excess amounts of water or the use of diuretics (water pills). Rarely, it can be associated with disorders of your endocrine system, as side effects of illicit drug use (ecstasy), as a complication of some cancers especially small cell lung cancer, or as a complication of renal and liver disease or heart failure.  Mild hyponatremia causes no symptoms. It is only discovered with a blood test. As sodium levels in the blood decreases, symptoms begin to appear. This includes weakness, confusion, muscle cramping and seizures.  Home care    Reduce your daily water intake until the problem is corrected.    If you have been taking diuretics, you may be asked to stop taking them for a short time.    If you are having symptoms of weakness or confusion, do not drive or operate dangerous machinery until symptoms resolve.    If your sodium levels are too low to be managed at home with the above recommendations, you will be asked to go to the hospital to have your sodium replaced through your vein.  Follow-up care  Follow up with your healthcare provider for a repeat blood test within the next week, or as advised.  When to seek medical advice  Call your healthcare provider if any of the following  occur:    Increasing weakness    Dizziness    Irregular heartbeat, extra beats or very fast heart rate    Increasing confusion    Fainting or loss of consciousness    Seizure  Date Last Reviewed: 7/1/2017 2000-2017 [employer]. [address] [place] [address], [place] [address]. All rights reserved. This information is not intended as a substitute for professional medical care. Always follow your healthcare professional's instructions.

## 2017-10-27 NOTE — ED AVS SNAPSHOT
[facility]    [address] [facility] [facility] [address]    Phone:  [phone]    Fax:  [phone]                                       Elly Choe   MRN: [id number]    Department:  [facility]   Date of Visit:  10/27/2017           After Visit Summary Signature Page     I have received my discharge instructions, and my questions have been answered. I have discussed any challenges I see with this plan with the nurse or doctor.    ..........................................................................................................................................  Patient/Patient Representative Signature      ..........................................................................................................................................  Patient Representative Print Name and Relationship to Patient    ..................................................               ................................................  Date                                            Time    ..........................................................................................................................................  Reviewed by Signature/Title    ...................................................              ..............................................  Date                                                            Time

## 2017-10-27 NOTE — ED AVS SNAPSHOT
[facility] Emergency Department    [address] [facility] [address]    Phone:  [phone]    Fax:  [phone]                                       Elly Choe   MRN: [id number]    Department:  [facility] Emergency Department   Date of Visit:  10/27/2017           Patient Information     Date Of Birth          1935        Your diagnoses for this visit were:     Right flank pain     Acute right-sided low back pain without sciatica     Acute UTI     Hyponatremia        You were seen by Warner Resendez MD.      Follow-up Information     Follow up with Mirela Yusuf PADDY.    Specialty:  Pharmacist    Why:  As needed    Contact information:    [facility]  [address] [facility] [address]  [phone]          Follow up with [facility] Emergency Department.    Specialty:  EMERGENCY MEDICINE    Why:  If symptoms worsen    Contact information:    [address] [place] [id number] [phone]    Additional information:    The medical center is located at   [address] [place] [address]. (between [facility] and   [facility] in [place], four miles north   of [place]).        Discharge Instructions        return if symptoms worsen or new symptoms develop.  Take antibiotics as directed.  Drink a little less water for the next few days and have your sodium rechecked on Monday either by primary care or the emergency department.  Take Tylenol for pain.  If increased pain numbness weakness in any extremity fever altered mental status or other symptoms present please return for further evaluation and care.  Causes of Lumbar (Low Back) Pain  Low back pain can be caused by problems with any part of the lumbar spine. A disk can herniate (push out) and press on a nerve. Vertebrae can rub against each other or slip out of place. This can irritate facet joints and nerves. It can also lead to stenosis, a narrowing of the spinal canal or foramen.  Pressure  "from a disk  Constant wear and tear on a disk can cause it to weaken and push outward. Part of the disk may then press on nearby nerves. There are two common types of herniated disks:  Contained means the soft nucleus is protruding outward.   Extruded means the firm annulus has torn, letting the soft center squeeze through.     Pressure from bone  An unstable spine   With age, a disk may thin and wear out. Vertebrae above and below the disk may begin to touch. This can put pressure on nerves. It can also cause bone spurs (growths) to form where the bones rub together.    Stenosis results when bone spurs narrow the foramen or spinal canal. This also puts pressure on nerves. Slipping vertebrae can irritate nerves and joints. They can also worsen stenosis.    In some cases, vertebrae become unstable and slip forward. This is called spondylolisthesis.     Date Last Reviewed: 10/12/2015    5331-6874 [employer]. [address] [place] [address]. All rights reserved. This information is not intended as a substitute for professional medical care. Always follow your healthcare professional's instructions.             * BLADDER INFECTION,Female (Adult)    A bladder infection (\"cystitis\" or \"UTI\") usually causes a constant urge to urinate and a burning when passing urine. Urine may be cloudy, smelly or dark. There may be pain in the lower abdomen. A bladder infection occurs when bacteria from the vaginal area enter the bladder opening (urethra). This can occur from sexual intercourse, wearing tight clothing, dehydration and other factors.  HOME CARE:  1. Drink lots of fluids (at least 6-8 glasses a day, unless you must restrict fluids for other medical reasons). This will force the medicine into your urinary system and flush the bacteria out of your body. Cranberry juice has been shown to help clear out the bacteria.  2. Avoid sexual intercourse until your symptoms are gone.  3. A bladder infection is " treated with antibiotics. You may also be given Pyridium (generic = phenazopyridine) to reduce the burning sensation. This medicine will cause your urine to become a bright orange color. The orange urine may stain clothing. You may wear a pad or panty-liner to protect clothing.  PREVENTING FUTURE INFECTIONS:  1. Always wipe from front to back after a bowel movement.  2. Keep the genital area clean and dry.  3. Drink plenty of fluids each day to avoid dehydration.  4. Urinate right after intercourse to flush out the bladder.  5. Wear cotton underwear and cotton-lined panty hose; avoid tight-fitting pants.  6. If you are on birth control pills and are having frequent bladder infections, discuss with your doctor.  FOLLOW UP: Return to this facility or see your doctor if ALL symptoms are not gone after three days of treatment.  GET PROMPT MEDICAL ATTENTION if any of the following occur:    Fever over 101 F (38.3 C)    No improvement by the third day of treatment    Increasing back or abdominal pain    Repeated vomiting; unable to keep medicine down    Weakness, dizziness or fainting    Vaginal discharge    Pain, redness or swelling in the labia (outer vaginal area)    8789-1802 [employer]. [address] [place] [address]. All rights reserved. This information is not intended as a substitute for professional medical care. Always follow your healthcare professional's instructions.  This information has been modified by your health care provider with permission from the publisher.      Flank Pain, Uncertain Cause  The flank is the area between your upper abdomen and your back. Pain there is often caused by a problem with your kidneys. It might be a kidney infection or a kidney stone. Other causes of flank pain include spinal arthritis, a pinched nerve from a back injury, or a back muscle strain or spasm.  The cause of your flank pain is not certain. You may need other tests.  Home care  Follow  these tips when caring for yourself at home:    You may use acetaminophen or ibuprofen to control pain, unless your health care provider prescribed another medicine. If you have chronic liver or kidney disease, talk with your provider before taking these medicines. Also talk with your provider first if you ve ever had a stomach ulcer or GI bleeding.    If the pain is coming from your muscles, you may get relief with ice or heat. During the first 2 days after the injury, put an ice pack on the painful area for 20 minutes every 2 to 4 hours. This will reduce swelling and pain. A hot shower, hot bath, or heating pad works well for a muscle spasm. You can start with ice, then switch to heat after 2 days. You might find that alternating ice and heat works well. Use the method that feels the best to you.  Follow-up care  Follow up with your healthcare provider if your symptoms don t get better over the next few days.  When to seek medical advice  Call your healthcare provider right away if any of these happen:    Repeated vomiting    Fever of 100.4 F (38 C) or higher, or as directed by your health care provider    Flank pain that gets worse    Pain that spreads to the front of your belly (abdomen)    Dizziness, weakness, or fainting    Blood in your urine    Burning feeling when you urinate or the need to urinate often    Pain in one of your legs that gets worse    Numbness or weakness in a leg  Date Last Reviewed: 10/1/2016    3182-4966 [employer]. [address] [place] [address]. All rights reserved. This information is not intended as a substitute for professional medical care. Always follow your healthcare professional's instructions.          Hyponatremia  Hyponatremia means low sodium levels in the blood. This condition most often occurs after prolonged vomiting or diarrhea, which causes your body to lose too much water and sodium. It can also result from drinking excess amounts of water or  the use of diuretics (water pills). Rarely, it can be associated with disorders of your endocrine system, as side effects of illicit drug use (ecstasy), as a complication of some cancers especially small cell lung cancer, or as a complication of renal and liver disease or heart failure.  Mild hyponatremia causes no symptoms. It is only discovered with a blood test. As sodium levels in the blood decreases, symptoms begin to appear. This includes weakness, confusion, muscle cramping and seizures.  Home care    Reduce your daily water intake until the problem is corrected.    If you have been taking diuretics, you may be asked to stop taking them for a short time.    If you are having symptoms of weakness or confusion, do not drive or operate dangerous machinery until symptoms resolve.    If your sodium levels are too low to be managed at home with the above recommendations, you will be asked to go to the hospital to have your sodium replaced through your vein.  Follow-up care  Follow up with your healthcare provider for a repeat blood test within the next week, or as advised.  When to seek medical advice  Call your healthcare provider if any of the following occur:    Increasing weakness    Dizziness    Irregular heartbeat, extra beats or very fast heart rate    Increasing confusion    Fainting or loss of consciousness    Seizure  Date Last Reviewed: 7/1/2017 2000-2017 [employer]. [address] [place] [address], [place] [address]. All rights reserved. This information is not intended as a substitute for professional medical care. Always follow your healthcare professional's instructions.          24 Hour Appointment Hotline       To make an appointment at any [facility], call [phone] ([phone]). If you don't have a family doctor or clinic, we will help you find one. [facility] (formerly [facility]] are conveniently located to serve the needs of you and your family.             Review of your medicines       START taking        Dose / Directions Last dose taken    ciprofloxacin 250 MG tablet   Commonly known as:  CIPRO   Dose:  250 mg   Quantity:  10 tablet        Take 1 tablet (250 mg) by mouth 2 times daily   Refills:  0          Our records show that you are taking the medicines listed below. If these are incorrect, please call your family doctor or clinic.        Dose / Directions Last dose taken    allopurinol 100 MG tablet   Commonly known as:  ZYLOPRIM   Dose:  100 mg   Quantity:  90 tablet        Take 1 tablet (100 mg) by mouth daily   Refills:  3        aspirin 81 MG EC tablet   Dose:  81 mg        Take 1 tablet (81 mg) by mouth daily   Refills:  0        GLUCOSAMINE-CHONDROITIN PO   Dose:  1 tablet        Take 1 tablet by mouth 2 times daily   Refills:  0        levothyroxine 75 MCG tablet   Commonly known as:  SYNTHROID/LEVOTHROID   Dose:  75 mcg   Quantity:  90 tablet        Take 1 tablet (75 mcg) by mouth daily   Refills:  3        multivitamin, therapeutic with minerals Tabs tablet   Dose:  1 tablet   Quantity:  100 tablet        Take 1 tablet by mouth daily   Refills:  3        TYLENOL PO   Dose:  325 mg        Take 325 mg by mouth every 4 hours as needed for mild pain or fever   Refills:  0                Prescriptions were sent or printed at these locations (1 Prescription)                   [facility] [address] [address]    Telephone:  [phone]   Fax:  [phone]   Hours:                  Printed at Department/Unit printer (1 of 1)         ciprofloxacin (CIPRO) 250 MG tablet                Procedures and tests performed during your visit     Abd/pelvis CT - no contrast - Stone Protocol    Basic metabolic panel    CBC with platelets, differential    Chest XR,  PA & LAT    Lumbar spine XR, 2-3 views    UA reflex to Microscopic    Urine Culture Aerobic Bacterial      Orders Needing Specimen Collection     None       Pending Results     Date and Time Order Name Status Description    10/27/2017 1328 Abd/pelvis CT - no contrast - Stone Protocol Preliminary     10/27/2017 1206 Urine Culture Aerobic Bacterial Preliminary             Pending Culture Results     Date and Time Order Name Status Description    10/27/2017 1206 Urine Culture Aerobic Bacterial Preliminary             Pending Results Instructions     If you had any lab results that were not finalized at the time of your Discharge, you can call the ED Lab Result RN at [phone]. You will be contacted by this team for any positive Lab results or changes in treatment. The nurses are available 7 days a week from 10A to 6:30P.  You can leave a message 24 hours per day and they will return your call.        Test Results From Your Hospital Stay        10/27/2017 11:08 AM      Narrative     XR LUMBAR SPINE 2-3 VIEWS   10/27/2017 11:01 AM     HISTORY: lower back pain    COMPARISON: Lumbar spine x-ray from 1/12/2009.        Impression     IMPRESSION: Advanced degenerative disc disease at L4-L5 and moderate  degenerative disc disease at L5-S1. Mild grade 1 degenerative  spondylolisthesis at L3-L4. Spondylolisthesis is new since the prior  study. Degenerative disc disease in the lower lumbar spine has  progressed since the prior study. No evidence of compression fracture.  Stable appearing aortic calcification.    DELIA MATTHEWS MD         10/27/2017 11:07 AM      Narrative     XR CHEST 2 VW   10/27/2017 11:03 AM     HISTORY: sob    COMPARISON: 4/8/2016 chest x-ray.        Impression     IMPRESSION: Heart size is normal. Atherosclerotic calcification of the  aorta. Pulmonary vasculature is normal. Lungs are clear. No pleural  fluid. No acute disease.    DELIA MATTHEWS MD         10/27/2017 11:07 AM      Component Results     Component Value Ref Range & Units Status    WBC 5.2 4.0 - 11.0 10e9/L Final    RBC Count 4.51 3.8 - 5.2 10e12/L Final    Hemoglobin 14.7 11.7 - 15.7 g/dL Final     Hematocrit 40.6 35.0 - 47.0 % Final    MCV 90 78 - 100 fl Final    MCH 32.6 26.5 - 33.0 pg Final    MCHC 36.2 31.5 - 36.5 g/dL Final    RDW 12.3 10.0 - 15.0 % Final    Platelet Count 176 150 - 450 10e9/L Final    Diff Method Automated Method  Final    % Neutrophils 64.3 % Final    % Lymphocytes 21.6 % Final    % Monocytes 8.6 % Final    % Eosinophils 3.4 % Final    % Basophils 0.8 % Final    % Immature Granulocytes 1.3 % Final    Absolute Neutrophil 3.4 1.6 - 8.3 10e9/L Final    Absolute Lymphocytes 1.1 0.8 - 5.3 10e9/L Final    Absolute Monocytes 0.5 0.0 - 1.3 10e9/L Final    Absolute Eosinophils 0.2 0.0 - 0.7 10e9/L Final    Absolute Basophils 0.0 0.0 - 0.2 10e9/L Final    Abs Immature Granulocytes 0.1 0 - 0.4 10e9/L Final         10/27/2017 11:08 AM      Component Results     Component Value Ref Range & Units Status    Sodium 129 (L) 133 - 144 mmol/L Final    Potassium 4.2 3.4 - 5.3 mmol/L Final    Chloride 95 94 - 109 mmol/L Final    Carbon Dioxide 27 20 - 32 mmol/L Final    Anion Gap 7 3 - 14 mmol/L Final    Glucose 95 70 - 99 mg/dL Final    Urea Nitrogen 13 7 - 30 mg/dL Final    Creatinine 1.17 (H) 0.52 - 1.04 mg/dL Final    GFR Estimate 44 (L) >60 mL/min/1.7m2 Final    Non  GFR Calc    GFR Estimate If Black 54 (L) >60 mL/min/1.7m2 Final    African American GFR Calc    Calcium 8.4 (L) 8.5 - 10.1 mg/dL Final         10/27/2017 11:57 AM      Component Results     Component Value Ref Range & Units Status    Color Urine Yellow  Final    Appearance Urine Clear  Final    Glucose Urine Negative NEG^Negative mg/dL Final    Bilirubin Urine Negative NEG^Negative Final    Ketones Urine Negative NEG^Negative mg/dL Final    Specific Gravity Urine 1.005 1.003 - 1.035 Final    Blood Urine Negative NEG^Negative Final    pH Urine 7.5 (H) 5.0 - 7.0 pH Final    Protein Albumin Urine Negative NEG^Negative mg/dL Final    Urobilinogen mg/dL Normal 0.0 - 2.0 mg/dL Final    Nitrite Urine Negative NEG^Negative Final     Leukocyte Esterase Urine Moderate (A) NEG^Negative Final    Source Midstream Urine  Final    RBC Urine 0 0 - 2 /HPF Final    WBC Urine 7 (H) 0 - 2 /HPF Final    Bacteria Urine Few (A) NEG^Negative /HPF Final    Squamous Epithelial /HPF Urine 2 (H) 0 - 1 /HPF Final    Transitional Epi <1 0 - 1 /HPF Final         10/27/2017  3:05 PM      Component Results     Component    Specimen Description    Midstream Urine    Special Requests    Specimen received in preservative    Culture Micro    PENDING         10/27/2017  2:25 PM      Narrative     CT ABDOMEN AND PELVIS WITHOUT CONTRAST  10/27/2017 2:11 PM     HISTORY: Right flank pain.    TECHNIQUE: Renal stone protocol without IV contrast.  Radiation dose  for this scan was reduced using automated exposure control, adjustment  of the mA and/or kV according to patient size, or iterative  reconstruction technique.    COMPARISON: None.    FINDINGS: Scans through the lung bases are unremarkable.    The noncontrast appearance of the liver is normal. Spleen and pancreas  are normal. No adrenal lesions.    No urinary tract stones or hydronephrosis. Kidneys have an  unremarkable noncontrast appearance.    There are atherosclerotic changes of the aorta without evidence of  aneurysm. No adenopathy.    Scans through the pelvis demonstrate a normal-appearing bladder.    There are multiple sigmoid diverticula but no evidence of  diverticulitis. No evidence of appendicitis. There is no dilated  bowel, thickened bowel wall, free air, or free fluid.        Impression     IMPRESSION:  1. No urinary tract stones or hydronephrosis.   2. Multiple sigmoid diverticula but no evidence of diverticulitis. No  evidence of appendicitis. No acute findings.                Thank you for choosing [facility]       Thank you for choosing [facility] for your care. Our goal is always to provide you with excellent care. Hearing back from our patients is one way we can continue to improve our services. Please take  "a few minutes to complete the written survey that you may receive in the mail after you visit with us. Thank you!        [employer] Information     [employer] lets you send messages to your doctor, view your test results, renew your prescriptions, schedule appointments and more. To sign up, go to [email or web address] . Click on \"Log in\" on the left side of the screen, which will take you to the Welcome page. Then click on \"Sign up Now\" on the right side of the page.     You will be asked to enter the access code listed below, as well as some personal information. Please follow the directions to create your username and password.     Your access code is: [email or web address]  Expires: 2017 12:27 PM     Your access code will  in 90 days. If you need help or a new code, please call your [facility] clinic or [phone].        Care EveryWhere ID     This is your Care EveryWhere ID. This could be used by other organizations to access your [facility] medical records  [id number]        Equal Access to Services     [facility]: Hadcassy Harrington, waaxda lujono, qaybta kaallu ramos, isidro finn . So [facility] [phone].    ATENCIÓN: Si habla español, tiene a willis disposición servicios gratuitos de asistencia lingüística. Llame al [phone].    We comply with applicable federal civil rights laws and [place] laws. We do not discriminate on the basis of race, color, national origin, age, disability, sex, sexual orientation, or gender identity.            After Visit Summary       This is your record. Keep this with you and show to your community pharmacist(s) and doctor(s) at your next visit.                  "

## 2017-10-28 LAB
BACTERIA SPEC CULT: NORMAL
Lab: NORMAL
SPECIMEN SOURCE: NORMAL

## 2017-10-30 ASSESSMENT — ENCOUNTER SYMPTOMS
FEVER: 0
APPETITE CHANGE: 0
DIZZINESS: 0
SHORTNESS OF BREATH: 0
LIGHT-HEADEDNESS: 0
FLANK PAIN: 1
HEMATURIA: 0
BACK PAIN: 1
NAUSEA: 0
BRUISES/BLEEDS EASILY: 0
ABDOMINAL PAIN: 0
ACTIVITY CHANGE: 1
CHEST TIGHTNESS: 0
NECK PAIN: 0
CONFUSION: 0
TROUBLE SWALLOWING: 0
DYSURIA: 0
CHILLS: 0
PALPITATIONS: 0
VOMITING: 0
HEADACHES: 0
WHEEZING: 0
NUMBNESS: 0
WEAKNESS: 0

## 2017-11-03 ENCOUNTER — OFFICE VISIT (OUTPATIENT)
Dept: FAMILY MEDICINE | Facility: CLINIC | Age: 82
End: 2017-11-03
Payer: COMMERCIAL

## 2017-11-03 VITALS
HEART RATE: 68 BPM | TEMPERATURE: 97.2 F | BODY MASS INDEX: 40.93 KG/M2 | HEIGHT: 63 IN | DIASTOLIC BLOOD PRESSURE: 70 MMHG | SYSTOLIC BLOOD PRESSURE: 118 MMHG | WEIGHT: 231 LBS

## 2017-11-03 DIAGNOSIS — R30.0 DYSURIA: Primary | ICD-10-CM

## 2017-11-03 DIAGNOSIS — R39.89 URINARY PROBLEM: ICD-10-CM

## 2017-11-03 LAB
ALBUMIN UR-MCNC: NEGATIVE MG/DL
APPEARANCE UR: CLEAR
BILIRUB UR QL STRIP: NEGATIVE
COLOR UR AUTO: YELLOW
GLUCOSE UR STRIP-MCNC: NEGATIVE MG/DL
HGB UR QL STRIP: NEGATIVE
KETONES UR STRIP-MCNC: NEGATIVE MG/DL
LEUKOCYTE ESTERASE UR QL STRIP: NEGATIVE
NITRATE UR QL: NEGATIVE
PH UR STRIP: 6 PH (ref 5–7)
SOURCE: NORMAL
SP GR UR STRIP: 1.02 (ref 1–1.03)
UROBILINOGEN UR STRIP-ACNC: 0.2 EU/DL (ref 0.2–1)

## 2017-11-03 PROCEDURE — 99213 OFFICE O/P EST LOW 20 MIN: CPT | Performed by: NURSE PRACTITIONER

## 2017-11-03 PROCEDURE — 81003 URINALYSIS AUTO W/O SCOPE: CPT | Performed by: NURSE PRACTITIONER

## 2017-11-03 NOTE — NURSING NOTE
"Chief Complaint   Patient presents with     Urinary Problem       Initial /70 (BP Location: Right arm, Cuff Size: Adult Large)  Pulse 68  Temp 97.2  F (36.2  C) (Tympanic)  Ht 5' 3\" (1.6 m)  Wt 231 lb (104.8 kg)  LMP 01/01/1977  BMI 40.92 kg/m2 Estimated body mass index is 40.92 kg/(m^2) as calculated from the following:    Height as of this encounter: 5' 3\" (1.6 m).    Weight as of this encounter: 231 lb (104.8 kg).  Medication Reconciliation: complete    Health Maintenance that is potentially due pending provider review:  NONE      Is there anyone who you would like to be able to receive your results? No  If yes have patient fill out CLEO    Yanci English MA     "

## 2017-11-03 NOTE — MR AVS SNAPSHOT
"              After Visit Summary   11/3/2017    Elly Choe    MRN: [id number]           Patient Information     Date Of Birth          1935        Visit Information        Provider Department      11/3/2017 1:20 PM Joy Kam APRN CNP [facility]        Today's Diagnoses     Dysuria    -  1    Urinary problem           Follow-ups after your visit        Your next 10 appointments already scheduled     Edgar 10, 2018   Procedure with Sen Parry MD   [facility] (--)    [address] [address] [phone]           The medical center is located at [address]. (between [address] in [place], [place] miles north of [place]).              Who to contact     If you have questions or need follow up information about today's clinic visit or your schedule please contact [facility] directly at [phone].  Normal or non-critical lab and imaging results will be communicated to you by MyChart, letter or phone within 4 business days after the clinic has received the results. If you do not hear from us within 7 days, please contact the clinic through [facility] or phone. If you have a critical or abnormal lab result, we will notify you by phone as soon as possible.  Submit refill requests through Infer or call your pharmacy and they will forward the refill request to us. Please allow 3 business days for your refill to be completed.          Additional Information About Your Visit        Infer Information     Infer lets you send messages to your doctor, view your test results, renew your prescriptions, schedule appointments and more. To sign up, go to [email or web address] . Click on \"Log in\" on the left side of the screen, which will take you to the Welcome page. Then click on \"Sign up Now\" on the right side of the page.     You will be asked to enter the access code listed below, as well as " "some personal information. Please follow the directions to create your username and password.     Your access code is: [email or web address]  Expires: 2017 11:27 AM     Your access code will  in 90 days. If you need help or a new code, please call your [place] clinic or [phone].        Care EveryWhere ID     This is your Care EveryWhere ID. This could be used by other organizations to access your [place] medical records  [id number]        Your Vitals Were     Pulse Temperature Height Last Period BMI (Body Mass Index)       68 97.2  F (36.2  C) (Tympanic) 5' 3\" (1.6 m) 1977 40.92 kg/m2        Blood Pressure from Last 3 Encounters:   17 118/70   10/27/17 134/72   10/01/17 137/79    Weight from Last 3 Encounters:   17 231 lb (104.8 kg)   17 223 lb (101.2 kg)   17 223 lb (101.2 kg)              We Performed the Following     *UA reflex to Microscopic and Culture ([facility] and [facility] (except [place] and [place])        Primary Care Provider Office Phone # Fax #    Mirela Madelin, [facility] [phone]       [facility] [address] [place] [address]        Equal Access to Services     MASON VICENTE : Hadii aad ku hadasho Sominhali, waaxda luqadaha, qaybta kaalmada rachel, isidro fritz. So [facility] [phone].    ATENCIÓN: Si habla español, tiene a willis disposición servicios gratuitos de asistencia lingüística. Llame al [phone].    We comply with applicable federal civil rights laws and [place] laws. We do not discriminate on the basis of race, color, national origin, age, disability, sex, sexual orientation, or gender identity.            Thank you!     Thank you for choosing [facility]  for your care. Our goal is always to provide you with excellent care. Hearing back from our patients is one way we can continue to improve our services. Please take a few minutes to complete the " written survey that you may receive in the mail after your visit with us. Thank you!             Your Updated Medication List - Protect others around you: Learn how to safely use, store and throw away your medicines at www.disposemymeds.org.          This list is accurate as of: 11/3/17 11:59 PM.  Always use your most recent med list.                   Brand Name Dispense Instructions for use Diagnosis    allopurinol 100 MG tablet    ZYLOPRIM    90 tablet    Take 1 tablet (100 mg) by mouth daily    Gouty arthropathy       aspirin 81 MG EC tablet      Take 1 tablet (81 mg) by mouth daily    CAD (coronary artery disease)       ciprofloxacin 250 MG tablet    CIPRO    10 tablet    Take 1 tablet (250 mg) by mouth 2 times daily        GLUCOSAMINE-CHONDROITIN PO      Take 1 tablet by mouth 2 times daily        levothyroxine 75 MCG tablet    SYNTHROID/LEVOTHROID    90 tablet    Take 1 tablet (75 mcg) by mouth daily    Acquired hypothyroidism       multivitamin, therapeutic with minerals Tabs tablet     100 tablet    Take 1 tablet by mouth daily    Anemia, unspecified anemia type       TYLENOL PO      Take 325 mg by mouth every 4 hours as needed for mild pain or fever

## 2017-11-03 NOTE — PROGRESS NOTES
SUBJECTIVE:   Elly Choe is a 81 year old female who presents to clinic today for the following health issues:    URINARY TRACT SYMPTOMS  Onset: symptoms started a couple days ago    Description:   Painful urination (Dysuria): YES  Blood in urine (Hematuria): no   Delay in urine (Hesitency): YES    Intensity: moderate    Progression of Symptoms:  improving    Accompanying Signs & Symptoms:  Fever/chills: no   Flank pain no   Nausea and vomiting: no   Any vaginal symptoms: none  Abdominal/Pelvic Pain: no     History:   History of frequent UTI's: YES  History of kidney stones: no   Sexually Active: no   Possibility of pregnancy: No    Precipitating factors:   none    Therapies Tried and outcome: cipro    Symptoms improving-on Cipro for UTI from ED last dose tonight    Problem list and histories reviewed & adjusted, as indicated.  Additional history: as documented    Patient Active Problem List   Diagnosis     Gastroesophageal reflux disease without esophagitis     Hemangioma     Benign essential hypertension     Diverticulitis of colon     surgical menopause (SARAH BSO) for non-cancerous reasons     Morbid obesity due to excess calories (H)     Idiopathic chronic gout of multiple sites without tophus     Chronic airway obstruction (H)     Acute dermatitis due to solar radiation     Hyperlipidemia LDL goal <100     CKD (chronic kidney disease) stage 3, GFR 30-59 ml/min     Advanced directives, counseling/discussion     Acquired hypothyroidism     DJD of shoulder     Phlebitis and thrombophlebitis of superficial vessels of lower extremities     Status post shoulder joint replacement     Anemia     Primary localized osteoarthrosis, lower leg     S/P knee replacement left     Generalized anxiety disorder     Health Care Home     Coronary artery disease involving native coronary artery of native heart without angina pectoris     S/P PTCA (percutaneous transluminal coronary angioplasty)     FH: rheumatoid  arthritis     Osteopenia     Status post total shoulder arthroplasty, left     Past Surgical History:   Procedure Laterality Date     ARTHROPLASTY KNEE  4/13/2011    Procedure:ARTHROPLASTY KNEE; Surgeon:SEN PARRY; Location:[facility]     ARTHROPLASTY KNEE  12/26/2012    Procedure: ARTHROPLASTY KNEE;  Left Total Knee Arthroplasty;  Surgeon: Sen Parry MD;  Location: [facility]     ARTHROPLASTY SHOULDER  9/26/2012    Procedure: ARTHROPLASTY SHOULDER;  Right total shoulder arthroplasty;  Surgeon: Sen Parry MD;  Location: [facility]     ARTHROPLASTY SHOULDER Left 5/4/2016    Procedure: ARTHROPLASTY SHOULDER;  Surgeon: Sen Parry MD;  Location: [facility]     ARTHROSCOPY KNEE IRRIGATION AND DEBRIDEMENT  12/10/2010    ARTHROSCOPY KNEE IRRIGATION AND DEBRIDEMENT performed by LEY, JEFFREY DUANE at [facility] OR     CATARACT IOL, RT/LT  4/2010    bilateral     COLONOSCOPY  2002     COLONOSCOPY  6/19/2014    Procedure: COLONOSCOPY;  Surgeon: Steve Deng MD;  Location: [facility]     CORONARY ANGIOGRAPHY ADULT ORDER       HYSTERECTOMY, SARAH  1976    Total abdominal hysterectomy & bilateral salpingectomy oophorectomy     TONSILLECTOMY & ADENOIDECTOMY      as child       Social History   Substance Use Topics     Smoking status: Former Smoker     Quit date: 1/1/1970     Smokeless tobacco: Never Used      Comment: 20 year hx of smoking 3 packs of cigarettes daily; quit 1970     Alcohol use No     Family History   Problem Relation Age of Onset     Alcohol/Drug Father      DIABETES Brother      DIABETES Brother      Arthritis Mother 20     Other - See Comments Daughter      Fallopian tube infection 06/2015         Current Outpatient Prescriptions   Medication Sig Dispense Refill     Acetaminophen (TYLENOL PO) Take 325 mg by mouth every 4 hours as needed for mild pain or fever       ciprofloxacin (CIPRO) 250 MG tablet Take 1 tablet (250 mg) by mouth 2 times daily 10 tablet 0     GLUCOSAMINE-CHONDROITIN PO Take 1 tablet by mouth 2  "times daily       levothyroxine (SYNTHROID/LEVOTHROID) 75 MCG tablet Take 1 tablet (75 mcg) by mouth daily 90 tablet 3     allopurinol (ZYLOPRIM) 100 MG tablet Take 1 tablet (100 mg) by mouth daily 90 tablet 3     multivitamin, therapeutic with minerals (MULTI-VITAMIN) TABS Take 1 tablet by mouth daily 100 tablet 3     aspirin EC 81 MG EC tablet Take 1 tablet (81 mg) by mouth daily       Allergies   Allergen Reactions     Sulfa Drugs Other (See Comments)     Causes burning when urinates. Causes yeast infections.     Percocet [Oxycodone-Acetaminophen] Other (See Comments)     Freaked out and broke a toilet at [facility]     Labs reviewed in EPIC      Reviewed and updated as needed this visit by clinical staffTobacco  Allergies  Meds  Med Hx  Surg Hx  Fam Hx  Soc Hx      Reviewed and updated as needed this visit by Provider       ROS:  Constitutional, HEENT, cardiovascular, pulmonary, gi and gu systems are negative, except as otherwise noted.      OBJECTIVE:   /70 (BP Location: Right arm, Cuff Size: Adult Large)  Pulse 68  Temp 97.2  F (36.2  C) (Tympanic)  Ht 5' 3\" (1.6 m)  Wt 231 lb (104.8 kg)  LMP 01/01/1977  BMI 40.92 kg/m2  Body mass index is 40.92 kg/(m^2).  GENERAL: healthy, alert and no distress  RESP: lungs clear to auscultation - no rales, rhonchi or wheezes  CV: regular rate and rhythm, normal S1 S2, no S3 or S4, no murmur, click or rub, no peripheral edema and peripheral pulses strong  ABDOMEN: soft, nontender, no hepatosplenomegaly, no masses and bowel sounds normal  MS: no gross musculoskeletal defects noted, no edema  BACK: no CVA tenderness, no paralumbar tenderness  PSYCH: mentation appears normal, affect normal/bright    Diagnostic Test Results:  No results found for this or any previous visit (from the past 24 hour(s)).    ASSESSMENT/PLAN:     1. Dysuria  Symptoms improving with antibiotics.  UA unremarkable for ongoing infection.  Follow up if symptoms do not continue to improve " or any worsening symptoms.    2. Urinary problem  UA unremarkable to cause.  - *UA reflex to Microscopic and Culture ([facility] (except [place] and [place])    Home care instructions were reviewed with the patient. The risks, benefits and treatment options of prescribed medications or other treatments have been discussed with the patient. The patient verbalized their understanding and should call or follow up if no improvement or if they develop further problems.    ROSA Patel [facility]

## 2017-12-11 DIAGNOSIS — M10.9 GOUTY ARTHROPATHY: ICD-10-CM

## 2017-12-14 RX ORDER — ALLOPURINOL 100 MG/1
TABLET ORAL
Qty: 90 TABLET | Refills: 3 | Status: SHIPPED | OUTPATIENT
Start: 2017-12-14

## 2017-12-14 NOTE — TELEPHONE ENCOUNTER
Routing refill request to provider for review/approval because:  Labs out of range:  Cr  Labs not current:  AST/ALT    Thank you  Yaa RM RN    Creatinine   Date Value Ref Range Status   10/27/2017 1.17 (H) 0.52 - 1.04 mg/dL Final

## 2017-12-19 ENCOUNTER — OFFICE VISIT (OUTPATIENT)
Dept: FAMILY MEDICINE | Facility: CLINIC | Age: 82
End: 2017-12-19
Payer: COMMERCIAL

## 2017-12-19 VITALS
SYSTOLIC BLOOD PRESSURE: 112 MMHG | OXYGEN SATURATION: 97 % | TEMPERATURE: 98.4 F | HEIGHT: 63 IN | BODY MASS INDEX: 41.11 KG/M2 | DIASTOLIC BLOOD PRESSURE: 62 MMHG | HEART RATE: 67 BPM | WEIGHT: 232 LBS

## 2017-12-19 DIAGNOSIS — E87.1 HYPONATREMIA: ICD-10-CM

## 2017-12-19 DIAGNOSIS — Z01.818 PREOP GENERAL PHYSICAL EXAM: Primary | ICD-10-CM

## 2017-12-19 DIAGNOSIS — E66.01 MORBID OBESITY DUE TO EXCESS CALORIES (H): ICD-10-CM

## 2017-12-19 DIAGNOSIS — I10 BENIGN ESSENTIAL HYPERTENSION: Chronic | ICD-10-CM

## 2017-12-19 DIAGNOSIS — I25.10 CORONARY ARTERY DISEASE INVOLVING NATIVE CORONARY ARTERY OF NATIVE HEART WITHOUT ANGINA PECTORIS: Chronic | ICD-10-CM

## 2017-12-19 DIAGNOSIS — J44.9 CHRONIC OBSTRUCTIVE PULMONARY DISEASE, UNSPECIFIED COPD TYPE (H): ICD-10-CM

## 2017-12-19 DIAGNOSIS — N18.30 CKD (CHRONIC KIDNEY DISEASE) STAGE 3, GFR 30-59 ML/MIN (H): Chronic | ICD-10-CM

## 2017-12-19 DIAGNOSIS — R39.89 URINARY PROBLEM: ICD-10-CM

## 2017-12-19 DIAGNOSIS — E03.9 ACQUIRED HYPOTHYROIDISM: Chronic | ICD-10-CM

## 2017-12-19 DIAGNOSIS — M16.11 OSTEOARTHRITIS OF RIGHT HIP, UNSPECIFIED OSTEOARTHRITIS TYPE: ICD-10-CM

## 2017-12-19 LAB
ALBUMIN UR-MCNC: NEGATIVE MG/DL
APPEARANCE UR: CLEAR
BILIRUB UR QL STRIP: NEGATIVE
COLOR UR AUTO: YELLOW
ERYTHROCYTE [DISTWIDTH] IN BLOOD BY AUTOMATED COUNT: 12.5 % (ref 10–15)
GLUCOSE UR STRIP-MCNC: NEGATIVE MG/DL
HCT VFR BLD AUTO: 40.1 % (ref 35–47)
HGB BLD-MCNC: 14 G/DL (ref 11.7–15.7)
HGB UR QL STRIP: NEGATIVE
KETONES UR STRIP-MCNC: NEGATIVE MG/DL
LEUKOCYTE ESTERASE UR QL STRIP: ABNORMAL
MCH RBC QN AUTO: 32.6 PG (ref 26.5–33)
MCHC RBC AUTO-ENTMCNC: 34.9 G/DL (ref 31.5–36.5)
MCV RBC AUTO: 94 FL (ref 78–100)
NITRATE UR QL: NEGATIVE
NON-SQ EPI CELLS #/AREA URNS LPF: ABNORMAL /LPF
PH UR STRIP: 8.5 PH (ref 5–7)
PLATELET # BLD AUTO: 188 10E9/L (ref 150–450)
RBC # BLD AUTO: 4.29 10E12/L (ref 3.8–5.2)
RBC #/AREA URNS AUTO: ABNORMAL /HPF
SOURCE: ABNORMAL
SP GR UR STRIP: 1.01 (ref 1–1.03)
UROBILINOGEN UR STRIP-ACNC: 0.2 EU/DL (ref 0.2–1)
WBC # BLD AUTO: 5.9 10E9/L (ref 4–11)
WBC #/AREA URNS AUTO: ABNORMAL /HPF

## 2017-12-19 PROCEDURE — 87081 CULTURE SCREEN ONLY: CPT | Mod: 59 | Performed by: NURSE PRACTITIONER

## 2017-12-19 PROCEDURE — 85027 COMPLETE CBC AUTOMATED: CPT | Performed by: NURSE PRACTITIONER

## 2017-12-19 PROCEDURE — 93000 ELECTROCARDIOGRAM COMPLETE: CPT | Performed by: NURSE PRACTITIONER

## 2017-12-19 PROCEDURE — 36415 COLL VENOUS BLD VENIPUNCTURE: CPT | Performed by: NURSE PRACTITIONER

## 2017-12-19 PROCEDURE — 80053 COMPREHEN METABOLIC PANEL: CPT | Performed by: NURSE PRACTITIONER

## 2017-12-19 PROCEDURE — 81001 URINALYSIS AUTO W/SCOPE: CPT | Performed by: NURSE PRACTITIONER

## 2017-12-19 PROCEDURE — 84443 ASSAY THYROID STIM HORMONE: CPT | Performed by: NURSE PRACTITIONER

## 2017-12-19 PROCEDURE — 87086 URINE CULTURE/COLONY COUNT: CPT | Performed by: NURSE PRACTITIONER

## 2017-12-19 PROCEDURE — 99215 OFFICE O/P EST HI 40 MIN: CPT | Performed by: NURSE PRACTITIONER

## 2017-12-19 NOTE — PATIENT INSTRUCTIONS
Hold your glucosamine and Aspirin 1 week prior to surgery  Before Your Surgery      Call your surgeon if there is any change in your health. This includes signs of a cold or flu (such as a sore throat, runny nose, cough, rash or fever).    Do not smoke, drink alcohol or take over the counter medicine (unless your surgeon or primary care doctor tells you to) for the 24 hours before and after surgery.    If you take prescribed drugs: Follow your doctor s orders about which medicines to take and which to stop until after surgery.    Eating and drinking prior to surgery: follow the instructions from your surgeon    Take a shower or bath the night before surgery. Use the soap your surgeon gave you to gently clean your skin. If you do not have soap from your surgeon, use your regular soap. Do not shave or scrub the surgery site.  Wear clean pajamas and have clean sheets on your bed.

## 2017-12-19 NOTE — NURSING NOTE
"Chief Complaint   Patient presents with     Pre-Op Exam       Initial /62 (Cuff Size: Child)  Pulse 67  Temp 98.4  F (36.9  C) (Tympanic)  Ht 5' 3\" (1.6 m)  Wt 232 lb (105.2 kg)  LMP 01/01/1977  SpO2 97%  BMI 41.1 kg/m2 Estimated body mass index is 41.1 kg/(m^2) as calculated from the following:    Height as of this encounter: 5' 3\" (1.6 m).    Weight as of this encounter: 232 lb (105.2 kg).  Medication Reconciliation: complete    Malina Mtecalf, CMA      "

## 2017-12-19 NOTE — PROGRESS NOTES
[facility]  [address] [place] [id number]  [phone]  Dept: [phone]    PRE-OP EVALUATION:  Today's date: 2017    Elly Choe (: 1935) presents for pre-operative evaluation assessment as requested by Dr. Sen Parry.  She requires evaluation and anesthesia risk assessment prior to undergoing surgery/procedure for treatment of right hip pain. Proposed procedure: Right total hip arthroplasty    Date of Surgery/ Procedure: 1/10/2018  Time of Surgery/ Procedure: 12:00pm   Hospital/Surgical Facility: [facility]  Primary Physician: Mirela Yusuf  Type of Anesthesia Anticipated: Choice    Patient has a Health Care Directive or Living Will:  YES     Preop Questions 2017   1.  Do you have a history of heart attack, stroke, stent, bypass or surgery on an artery in the head, neck, heart or legs? YES - stent    2.  Do you ever have any pain or discomfort in your chest? No   3.  Do you have a history of  Heart Failure? No   4.   Are you troubled by shortness of breath when:  walking on a level surface, or up a slight hill, or at night? No   5.  Do you currently have a cold, bronchitis or other respiratory infection? No   6.  Do you have a cough, shortness of breath, or wheezing? No   7.  Do you sometimes get pains in the calves of your legs when you walk? No   8. Do you or anyone in your family have previous history of blood clots? No   9.  Do you or does anyone in your family have a serious bleeding problem such as prolonged bleeding following surgeries or cuts? No   10. Have you ever had problems with anemia or been told to take iron pills? No   11. Have you had any abnormal blood loss such as black, tarry or bloody stools, or abnormal vaginal bleeding? No   12. Have you ever had a blood transfusion? No   13. Have you or any of your relatives ever had problems with anesthesia? No   14. Do you have sleep apnea, excessive snoring or daytime  drowsiness? YES - snoring   15. Do you have any prosthetic heart valves? No   16. Do you have prosthetic joints? YES - knee and shoulder   17. Is there any chance that you may be pregnant? No       URINARY TRACT SYMPTOMS  Onset: 1 week     Description:   Painful urination (Dysuria): YES- burning   Blood in urine (Hematuria): no   Delay in urine (Hesitency): YES    Intensity: moderate    Progression of Symptoms:  worsening    Accompanying Signs & Symptoms:  Fever/chills: no   Flank pain no   Nausea and vomiting: no   Any vaginal symptoms: none  Abdominal/Pelvic Pain: Yes     History:   History of frequent UTI's: YES  History of kidney stones: no   Sexually Active: no   Possibility of pregnancy: No    Precipitating factors:   None     Therapies Tried and outcome: none          HPI:                                                      Brief HPI related to upcoming procedure: ongoing right hip pain with osteoarthritis present in need of right total hip arthroplasty      See problem list for active medical problems.  Problems all longstanding and stable, except as noted/documented.  See ROS for pertinent symptoms related to these conditions.                                                                                                  .    MEDICAL HISTORY:                                                    Patient Active Problem List    Diagnosis Date Noted     Status post total shoulder arthroplasty, left 05/04/2016     Priority: Medium     Osteopenia 03/03/2015     Priority: Medium     Feb 2015 dexa with mostly mild osteopenia.  Repeat in 3-5 yrs.       FH: rheumatoid arthritis 02/12/2015     Priority: Medium     Negative RF, DEVON, CCP 3/26/13  Never tested, but mother had RA and Elly has classic hand deformities.  She does not want to hear about it.       Coronary artery disease involving native coronary artery of native heart without angina pectoris 06/18/2014     Priority: Medium     4/15/2014  Cardiac  catheterization-2 drug eluting stents placed in left main artery, moderated disease in LAD and circumflex, mild RCA, normal EF  On Brilinta for one year (dcd 4/28/15)           S/P PTCA (percutaneous transluminal coronary angioplasty) 06/18/2014     Priority: Medium     Health Care Home 04/17/2014     Priority: Medium       Status:  No Services Needed  Care Coordinator:  Yaa Nelson    See Letters for [facility] Care Plan  Date:  February 9, 2016               Generalized anxiety disorder 09/08/2013     Priority: Medium     Diagnosis updated by automated process. Provider to review and confirm.       S/P knee replacement left 01/02/2013     Priority: Medium     Primary localized osteoarthrosis, lower leg 12/26/2012     Priority: Medium     Anemia 12/06/2012     Priority: Medium     Status post shoulder joint replacement 10/05/2012     Priority: Medium     Phlebitis and thrombophlebitis of superficial vessels of lower extremities 10/02/2012     Priority: Medium     DJD of shoulder 09/26/2012     Priority: Medium     Acquired hypothyroidism 09/12/2012     Priority: Medium     Advanced directives, counseling/discussion 04/12/2012     Priority: Medium     Advance Directive received and scanned. Click on Code in the patient header to view. 4/12/2012     DNI/DNR         CKD (chronic kidney disease) stage 3, GFR 30-59 ml/min 03/30/2011     Priority: Medium     Acute dermatitis due to solar radiation 09/09/2010     Priority: Medium     Idiopathic chronic gout of multiple sites without tophus 06/03/2007     Priority: Medium     Josselin 3, 2007: right great toe with elevated uric acid, started on -ALLOPURINOL 100 MG daily. recheck uric acid 2 week after starting medication and titate as needed.  Problem list name updated by automated process. Provider to review and confirm  [employer] Update utility       surgical menopause (SARAH BSO) for non-cancerous reasons 01/08/2007     Priority: Medium     Age 41       Hemangioma 05/17/2005      Priority: Medium     Area on chest subcutaneous between breast tissue  Problem list name updated by automated process. Provider to review       Benign essential hypertension 05/17/2005     Priority: Medium     Diverticulitis of colon 05/17/2005     Priority: Medium     Problem list name updated by automated process. Provider to review       Hyperlipidemia LDL goal <100 10/31/2010     Priority: Low     Chronic airway obstruction (H) 01/13/2008     Priority: Low     9/22/2010:She has been told there is lung damage from smoking over the years but has not been limited by any respiratory symptoms.  Not using any inhalers.   Problem list name updated by automated process. Provider to review       Morbid obesity due to excess calories (H) 01/08/2007     Priority: Low     Problem list name updated by automated process. Provider to review       Gastroesophageal reflux disease without esophagitis 05/17/2005     Priority: Low      Past Medical History:   Diagnosis Date     ACS (acute coronary syndrome) (H) 4/14/2014     Anemia 12/6/2012     Arthritis      Chronic kidney disease, stage III (moderate) 10/2/2012     CKD (chronic kidney disease) stage 3, GFR 30-59 ml/min 3/30/2011     COPD (chronic obstructive pulmonary disease) (H)      Generalised anxiety disorder 9/8/2013     GERD 5/17/2005     HX OF COMPOUND HEMANGIOMA NOS [228.00]      HX OF SUPERFICIAL PHLEBITIS      Hyperlipidemia LDL goal <100 10/31/2010     Hypertension      Hypertension goal BP (blood pressure) < 140/90 5/17/2005     Hypothyroidism 9/12/2012     Obesity, unspecified 1/8/2007     Phlebitis and thrombophlebitis of superficial vessels of lower extremities 10/2/2012     Primary localized osteoarthrosis, lower leg 12/26/2012     S/P knee replacement left 1/2/2013     Thyroid disease      Unspecified hypothyroidism 10/2/2012     Past Surgical History:   Procedure Laterality Date     ARTHROPLASTY KNEE  4/13/2011    Procedure:ARTHROPLASTY KNEE; Surgeon:THOMAS  SEN BHAT; Location:[facility]     ARTHROPLASTY KNEE  12/26/2012    Procedure: ARTHROPLASTY KNEE;  Left Total Knee Arthroplasty;  Surgeon: Sen Parry MD;  Location: [facility]     ARTHROPLASTY SHOULDER  9/26/2012    Procedure: ARTHROPLASTY SHOULDER;  Right total shoulder arthroplasty;  Surgeon: Sen Parry MD;  Location: [facility]     ARTHROPLASTY SHOULDER Left 5/4/2016    Procedure: ARTHROPLASTY SHOULDER;  Surgeon: Sen Parry MD;  Location: [facility]     ARTHROSCOPY KNEE IRRIGATION AND DEBRIDEMENT  12/10/2010    ARTHROSCOPY KNEE IRRIGATION AND DEBRIDEMENT performed by LEY, JEFFREY DUANE at [facility]     CATARACT IOL, RT/LT  4/2010    bilateral     COLONOSCOPY  2002     COLONOSCOPY  6/19/2014    Procedure: COLONOSCOPY;  Surgeon: Steve Deng MD;  Location: [facility]     CORONARY ANGIOGRAPHY ADULT ORDER       HYSTERECTOMY, SARAH  1976    Total abdominal hysterectomy & bilateral salpingectomy oophorectomy     TONSILLECTOMY & ADENOIDECTOMY      as child     Current Outpatient Prescriptions   Medication Sig Dispense Refill     allopurinol (ZYLOPRIM) 100 MG tablet TAKE ONE TABLET BY MOUTH EVERY DAY 90 tablet 3     Acetaminophen (TYLENOL PO) Take 325 mg by mouth every 4 hours as needed for mild pain or fever       GLUCOSAMINE-CHONDROITIN PO Take 1 tablet by mouth 2 times daily       levothyroxine (SYNTHROID/LEVOTHROID) 75 MCG tablet Take 1 tablet (75 mcg) by mouth daily 90 tablet 3     multivitamin, therapeutic with minerals (MULTI-VITAMIN) TABS Take 1 tablet by mouth daily 100 tablet 3     aspirin EC 81 MG EC tablet Take 1 tablet (81 mg) by mouth daily       OTC products: None, except as noted above    Allergies   Allergen Reactions     Sulfa Drugs Other (See Comments)     Causes burning when urinates. Causes yeast infections.     Percocet [Oxycodone-Acetaminophen] Other (See Comments)     Freaked out and broke a toilet at [facility]      Latex Allergy: NO    Social History   Substance Use Topics     Smoking status: Former  "Smoker     Quit date: 1/1/1970     Smokeless tobacco: Never Used      Comment: 20 year hx of smoking 3 packs of cigarettes daily; quit 1970     Alcohol use No     History   Drug Use No       REVIEW OF SYSTEMS:                                                    C: NEGATIVE for fever, chills, change in weight  I: NEGATIVE for worrisome rashes, moles or lesions  E: NEGATIVE for vision changes or irritation  E/M: NEGATIVE for ear, mouth and throat problems  R: NEGATIVE for significant cough or SOB  B: NEGATIVE for masses, tenderness or discharge  CV: NEGATIVE for chest pain, palpitations or peripheral edema  GI: NEGATIVE for nausea, abdominal pain, heartburn, or change in bowel habits   female: POSITIVE for frequency  MUSCULOSKELETAL:POSITIVE  for joint pain right hip  N: NEGATIVE for weakness, dizziness or paresthesias  E: NEGATIVE for temperature intolerance, skin/hair changes  H: NEGATIVE for bleeding problems  P: NEGATIVE for changes in mood or affect    EXAM:                                                    /62 (Cuff Size: Child)  Pulse 67  Temp 98.4  F (36.9  C) (Tympanic)  Ht 5' 3\" (1.6 m)  Wt 232 lb (105.2 kg)  LMP 01/01/1977  SpO2 97%  BMI 41.1 kg/m2    GENERAL APPEARANCE: healthy, alert and no distress     EYES: EOMI, PERRL     HENT: ear canals and TM's normal and nose and mouth without ulcers or lesions     NECK: no adenopathy, no asymmetry, masses, or scars and thyroid normal to palpation     RESP: lungs clear to auscultation - no rales, rhonchi or wheezes     CV: regular rates and rhythm, normal S1 S2, no S3 or S4 and no murmur, click or rub     ABDOMEN:  soft, nontender, no HSM or masses and bowel sounds normal     MS: extremities normal- no gross deformities noted, 1+  ankle edema and peripheral pulses normal     SKIN: no suspicious lesions or rashes     NEURO: Normal strength and tone, sensory exam grossly normal, mentation intact and speech normal     PSYCH: mentation appears normal. " and affect normal/bright    DIAGNOSTICS:                                                    EKG: appears normal, NSR, normal axis, normal intervals, no acute ST/T changes c/w ischemia, no LVH by voltage criteria    Results for orders placed or performed in visit on 12/19/17   Comprehensive metabolic panel   Result Value Ref [facility]    Sodium 127 (L) 133 - 144 mmol/L    Potassium 3.9 3.4 - 5.3 mmol/L    Chloride 93 (L) 94 - 109 mmol/L    Carbon Dioxide 28 20 - 32 mmol/L    Anion Gap 6 3 - 14 mmol/L    Glucose 84 70 - 99 mg/dL    Urea Nitrogen 15 7 - 30 mg/dL    Creatinine 1.16 (H) 0.52 - 1.04 mg/dL    GFR Estimate 45 (L) >60 mL/min/1.7m2    GFR Estimate If Black 54 (L) >60 mL/min/1.7m2    Calcium 8.6 8.5 - 10.1 mg/dL    Bilirubin Total 0.6 0.2 - 1.3 mg/dL    Albumin 3.8 3.4 - 5.0 g/dL    Protein Total 6.9 6.8 - 8.8 g/dL    Alkaline Phosphatase 84 40 - 150 U/L    ALT 20 0 - 50 U/L    AST 20 0 - 45 U/L   CBC with platelets   Result Value Ref [facility]    WBC 5.9 4.0 - 11.0 10e9/L    RBC Count 4.29 3.8 - 5.2 10e12/L    Hemoglobin 14.0 11.7 - 15.7 g/dL    Hematocrit 40.1 35.0 - 47.0 %    MCV 94 78 - 100 fl    MCH 32.6 26.5 - 33.0 pg    MCHC 34.9 31.5 - 36.5 g/dL    RDW 12.5 10.0 - 15.0 %    Platelet Count 188 150 - 450 10e9/L   TSH with free T4 reflex   Result Value Ref [facility]    TSH 0.62 0.40 - 4.00 mU/L   *UA reflex to Microscopic and Culture ([place] [facility] (except [place] and [place])   Result Value Ref [facility]    Color Urine Yellow     Appearance Urine Clear     Glucose Urine Negative NEG^Negative mg/dL    Bilirubin Urine Negative NEG^Negative    Ketones Urine Negative NEG^Negative mg/dL    Specific Gravity Urine 1.015 1.003 - 1.035    Blood Urine Negative NEG^Negative    pH Urine 8.5 (H) 5.0 - 7.0 pH    Protein Albumin Urine Negative NEG^Negative mg/dL    Urobilinogen Urine 0.2 0.2 - 1.0 EU/dL    Nitrite Urine Negative NEG^Negative    Leukocyte Esterase Urine Trace (A) NEG^Negative    Source Midstream Urine     Urine Microscopic   Result Value Ref [facility]    WBC Urine 2-5 (A) OTO2^O - 2 /HPF    RBC Urine O - 2 OTO2^O - 2 /HPF    Squamous Epithelial /LPF Urine Few FEW^Few /LPF   Methicillin resistant staph aureus cult   Result Value Ref [facility]    Specimen Description Nares     Culture Micro No MRSA isolated    Urine Culture Aerobic Bacterial   Result Value Ref [facility]    Specimen Description Midstream Urine     Special Requests Specimen received in preservative     Culture Micro       <10,000 colonies/mL  mixed urogenital karli  Susceptibility testing not routinely done       IMPRESSION:                                                    Reason for surgery/procedure: ongoing right hip pain with osteoarthritis present in need of right total hip arthroplasty    The proposed surgical procedure is considered INTERMEDIATE risk.    REVISED CARDIAC RISK INDEX  The patient has the following serious cardiovascular risks for perioperative complications such as (MI, PE, VFib and 3  AV Block):  Coronary Artery Disease (MI, positive stress test, angina, Qs on EKG)  INTERPRETATION: 1 risks: Class II (low risk - 0.9% complication rate)    The patient has the following additional risks for perioperative complications:  No identified additional risks      ICD-10-CM    1. Preop general physical exam Z01.818 Comprehensive metabolic panel     CBC with platelets     EKG 12-lead complete w/read - Clinics     Methicillin resistant staph aureus cult     Urine Microscopic   2. Osteoarthritis of right hip, unspecified osteoarthritis type M16.11    3. CKD (chronic kidney disease) stage 3, GFR 30-59 ml/min N18.3 Comprehensive metabolic panel   4. Acquired hypothyroidism E03.9 TSH with free T4 reflex   5. Coronary artery disease involving native coronary artery of native heart without angina pectoris I25.10 Comprehensive metabolic panel     EKG 12-lead complete w/read - Clinics   6. Benign essential hypertension I10 Comprehensive metabolic panel     EKG  12-lead complete w/read - Clinics   7. Morbid obesity due to excess calories (H) E66.01 EKG 12-lead complete w/read - Clinics   8. Chronic obstructive pulmonary disease, unspecified COPD type (H) J44.9    9. Urinary problem R39.89 *UA reflex to Microscopic and Culture ([facility] (except [place] and [place])     Urine Culture Aerobic Bacterial   10. Hyponatremia E87.1 Basic metabolic panel       RECOMMENDATIONS:                                                      --Consult hospital rounder / IM to assist post-op medical management    -Hyponatremia resolved on labs    Cardiovascular Risk  Beta blockers not indicated. No chest pain or shortness of breath with activity, EKG normal.      Pulmonary Risk  Incentive spirometry post op  Respiratory Therapy (Respiratory Care IP Consult)  post op      --Patient is to take all scheduled medications on the day of surgery EXCEPT for modifications listed below.    Anticoagulant or Antiplatelet Medication Use  ASPIRIN: Discontinue ASA 7-10 days prior to procedure to reduce bleeding risk.  It should be resumed post-operatively.          APPROVAL GIVEN to proceed with proposed procedure, without further diagnostic evaluation       Signed Electronically by: ROSA Patel CNP    Copy of this evaluation report is provided to requesting physician.    [facility] Preop Guidelines

## 2017-12-20 LAB
ALBUMIN SERPL-MCNC: 3.8 G/DL (ref 3.4–5)
ALP SERPL-CCNC: 84 U/L (ref 40–150)
ALT SERPL W P-5'-P-CCNC: 20 U/L (ref 0–50)
ANION GAP SERPL CALCULATED.3IONS-SCNC: 6 MMOL/L (ref 3–14)
AST SERPL W P-5'-P-CCNC: 20 U/L (ref 0–45)
BACTERIA SPEC CULT: NORMAL
BILIRUB SERPL-MCNC: 0.6 MG/DL (ref 0.2–1.3)
BUN SERPL-MCNC: 15 MG/DL (ref 7–30)
CALCIUM SERPL-MCNC: 8.6 MG/DL (ref 8.5–10.1)
CHLORIDE SERPL-SCNC: 93 MMOL/L (ref 94–109)
CO2 SERPL-SCNC: 28 MMOL/L (ref 20–32)
CREAT SERPL-MCNC: 1.16 MG/DL (ref 0.52–1.04)
GFR SERPL CREATININE-BSD FRML MDRD: 45 ML/MIN/1.7M2
GLUCOSE SERPL-MCNC: 84 MG/DL (ref 70–99)
Lab: NORMAL
POTASSIUM SERPL-SCNC: 3.9 MMOL/L (ref 3.4–5.3)
PROT SERPL-MCNC: 6.9 G/DL (ref 6.8–8.8)
SODIUM SERPL-SCNC: 127 MMOL/L (ref 133–144)
SPECIMEN SOURCE: NORMAL
TSH SERPL DL<=0.005 MIU/L-ACNC: 0.62 MU/L (ref 0.4–4)

## 2017-12-21 LAB
BACTERIA SPEC CULT: NORMAL
SPECIMEN SOURCE: NORMAL

## 2018-01-02 DIAGNOSIS — E87.1 HYPONATREMIA: ICD-10-CM

## 2018-01-02 LAB
ANION GAP SERPL CALCULATED.3IONS-SCNC: 5 MMOL/L (ref 3–14)
BUN SERPL-MCNC: 19 MG/DL (ref 7–30)
CALCIUM SERPL-MCNC: 8.7 MG/DL (ref 8.5–10.1)
CHLORIDE SERPL-SCNC: 100 MMOL/L (ref 94–109)
CO2 SERPL-SCNC: 30 MMOL/L (ref 20–32)
CREAT SERPL-MCNC: 1.15 MG/DL (ref 0.52–1.04)
GFR SERPL CREATININE-BSD FRML MDRD: 45 ML/MIN/1.7M2
GLUCOSE SERPL-MCNC: 96 MG/DL (ref 70–99)
POTASSIUM SERPL-SCNC: 4.5 MMOL/L (ref 3.4–5.3)
SODIUM SERPL-SCNC: 135 MMOL/L (ref 133–144)

## 2018-01-02 PROCEDURE — 36415 COLL VENOUS BLD VENIPUNCTURE: CPT | Performed by: NURSE PRACTITIONER

## 2018-01-02 PROCEDURE — 80048 BASIC METABOLIC PNL TOTAL CA: CPT | Performed by: NURSE PRACTITIONER

## 2018-01-09 ENCOUNTER — OFFICE VISIT (OUTPATIENT)
Dept: FAMILY MEDICINE | Facility: CLINIC | Age: 83
End: 2018-01-09
Payer: COMMERCIAL

## 2018-01-09 VITALS
BODY MASS INDEX: 41.46 KG/M2 | DIASTOLIC BLOOD PRESSURE: 60 MMHG | HEART RATE: 87 BPM | RESPIRATION RATE: 12 BRPM | OXYGEN SATURATION: 95 % | TEMPERATURE: 99.1 F | SYSTOLIC BLOOD PRESSURE: 134 MMHG | HEIGHT: 63 IN | WEIGHT: 234 LBS

## 2018-01-09 DIAGNOSIS — B34.9 VIRAL ILLNESS: Primary | ICD-10-CM

## 2018-01-09 LAB
FLUAV+FLUBV AG SPEC QL: NEGATIVE
FLUAV+FLUBV AG SPEC QL: NEGATIVE
SPECIMEN SOURCE: NORMAL

## 2018-01-09 PROCEDURE — 87804 INFLUENZA ASSAY W/OPTIC: CPT | Performed by: PHYSICIAN ASSISTANT

## 2018-01-09 PROCEDURE — 99213 OFFICE O/P EST LOW 20 MIN: CPT | Performed by: PHYSICIAN ASSISTANT

## 2018-01-09 ASSESSMENT — ENCOUNTER SYMPTOMS
HEADACHES: 1
SORE THROAT: 0
EYE DISCHARGE: 0
EYE REDNESS: 0
STRIDOR: 0
COUGH: 1
PSYCHIATRIC NEGATIVE: 1
NAUSEA: 0
CHILLS: 0
SPUTUM PRODUCTION: 0
FEVER: 0
EYE PAIN: 0
WHEEZING: 0
SHORTNESS OF BREATH: 0
WEAKNESS: 0
MUSCULOSKELETAL NEGATIVE: 1
VOMITING: 0

## 2018-01-09 NOTE — PROGRESS NOTES
HPI      SUBJECTIVE:   Elly Choe is a 82 year old female who presents to clinic today for flulike symptoms that began 2 days ago.  She is not sure if she has had a fever or not but she has a cough, congestion and some sinus pressure.  She had hip replacement scheduled for tomorrow but had to cancel this because of her current illness.  Glucose screen was negative    * Was suppose to have hip replaced tomorrow, but since she has had a URI she had to postpone it.  Was told to come in and get checked out to proceed with surgery that was scheduled for 1/31/17.  Will also have to redo her pre-op.  Currently has a cough, congestion/drainage, starting to get sinus pressure/pain.  Has been using cold medicine/throat lozenges         Problem list and histories reviewed & adjusted, as indicated.  Additional history: as documented    Patient Active Problem List   Diagnosis     Gastroesophageal reflux disease without esophagitis     Hemangioma     Benign essential hypertension     Diverticulitis of colon     surgical menopause (SARAH BSO) for non-cancerous reasons     Morbid obesity due to excess calories (H)     Idiopathic chronic gout of multiple sites without tophus     Chronic airway obstruction (H)     Acute dermatitis due to solar radiation     Hyperlipidemia LDL goal <100     CKD (chronic kidney disease) stage 3, GFR 30-59 ml/min     Advanced directives, counseling/discussion     Acquired hypothyroidism     DJD of shoulder     Phlebitis and thrombophlebitis of superficial vessels of lower extremities     Status post shoulder joint replacement     Anemia     Primary localized osteoarthrosis, lower leg     S/P knee replacement left     Generalized anxiety disorder     Health Care Home     Coronary artery disease involving native coronary artery of native heart without angina pectoris     S/P PTCA (percutaneous transluminal coronary angioplasty)     FH: rheumatoid arthritis     Osteopenia     Status post total  shoulder arthroplasty, left     Past Surgical History:   Procedure Laterality Date     ARTHROPLASTY KNEE  4/13/2011    Procedure:ARTHROPLASTY KNEE; Surgeon:SEN PARRY; Location:[facility]     ARTHROPLASTY KNEE  12/26/2012    Procedure: ARTHROPLASTY KNEE;  Left Total Knee Arthroplasty;  Surgeon: Sen Parry MD;  Location: [facility]     ARTHROPLASTY SHOULDER  9/26/2012    Procedure: ARTHROPLASTY SHOULDER;  Right total shoulder arthroplasty;  Surgeon: Sen Parry MD;  Location: [facility]     ARTHROPLASTY SHOULDER Left 5/4/2016    Procedure: ARTHROPLASTY SHOULDER;  Surgeon: Sen Parry MD;  Location: [facility]     ARTHROSCOPY KNEE IRRIGATION AND DEBRIDEMENT  12/10/2010    ARTHROSCOPY KNEE IRRIGATION AND DEBRIDEMENT performed by LEY, JEFFREY DUANE at [facility]     CATARACT IOL, RT/LT  4/2010    bilateral     COLONOSCOPY  2002     COLONOSCOPY  6/19/2014    Procedure: COLONOSCOPY;  Surgeon: Steve Deng MD;  Location: [facility]     CORONARY ANGIOGRAPHY ADULT ORDER       HYSTERECTOMY, SARAH  1976    Total abdominal hysterectomy & bilateral salpingectomy oophorectomy     TONSILLECTOMY & ADENOIDECTOMY      as child       Social History   Substance Use Topics     Smoking status: Former Smoker     Quit date: 1/1/1970     Smokeless tobacco: Never Used      Comment: 20 year hx of smoking 3 packs of cigarettes daily; quit 1970     Alcohol use No     Family History   Problem Relation Age of Onset     Alcohol/Drug Father      DIABETES Brother      DIABETES Brother      Arthritis Mother 20     Other - See Comments Daughter      Fallopian tube infection 06/2015         Current Outpatient Prescriptions   Medication Sig Dispense Refill     allopurinol (ZYLOPRIM) 100 MG tablet TAKE ONE TABLET BY MOUTH EVERY DAY 90 tablet 3     Acetaminophen (TYLENOL PO) Take 325 mg by mouth every 4 hours as needed for mild pain or fever       GLUCOSAMINE-CHONDROITIN PO Take 1 tablet by mouth 2 times daily       levothyroxine (SYNTHROID/LEVOTHROID) 75  MCG tablet Take 1 tablet (75 mcg) by mouth daily 90 tablet 3     multivitamin, therapeutic with minerals (MULTI-VITAMIN) TABS Take 1 tablet by mouth daily 100 tablet 3     aspirin EC 81 MG EC tablet Take 1 tablet (81 mg) by mouth daily       Allergies   Allergen Reactions     Sulfa Drugs Other (See Comments)     Causes burning when urinates. Causes yeast infections.     Percocet [Oxycodone-Acetaminophen] Other (See Comments)     Freaked out and broke a toilet at [facility]     Labs reviewed in EPIC      Reviewed and updated as needed this visit by clinical staff       Reviewed and updated as needed this visit by Provider           Review of Systems   Constitutional: Negative for chills and fever.   HENT: Positive for congestion. Negative for ear discharge, ear pain, hearing loss and sore throat.    Eyes: Negative for pain, discharge and redness.   Respiratory: Positive for cough. Negative for sputum production, shortness of breath, wheezing and stridor.    Cardiovascular: Negative for chest pain.   Gastrointestinal: Negative for nausea and vomiting.   Genitourinary: Negative.    Musculoskeletal: Negative.    Skin: Negative for itching and rash.   Neurological: Positive for headaches. Negative for weakness.   Endo/Heme/Allergies: Negative.    Psychiatric/Behavioral: Negative.          Physical Exam   Constitutional: She is oriented to person, place, and time and well-developed, well-nourished, and in no distress.   HENT:   Head: Normocephalic and atraumatic.   Right Ear: Hearing, tympanic membrane, external ear and ear canal normal.   Left Ear: Hearing, tympanic membrane, external ear and ear canal normal.   Nose: Sinus tenderness present. Right sinus exhibits maxillary sinus tenderness. Left sinus exhibits maxillary sinus tenderness.   Mouth/Throat: Oropharyngeal exudate present.   Eyes: Conjunctivae, EOM and lids are normal. Pupils are equal, round, and reactive to light.   Neck: Normal range of motion and full  passive range of motion without pain. Neck supple. Carotid bruit is not present. No tracheal deviation present. No thyroid mass and no thyromegaly present.   Cardiovascular: Normal rate, regular rhythm, normal heart sounds and normal pulses.    Pulmonary/Chest: Effort normal and breath sounds normal.   Abdominal: Soft. Normal appearance. There is no tenderness.   Musculoskeletal: Normal range of motion.   Lymphadenopathy:     She has no cervical adenopathy.   Neurological: She is alert and oriented to person, place, and time.   Skin: Skin is warm, dry and intact.   Psychiatric: Mood, memory, affect and judgment normal.       (B34.9) Viral illness  (primary encounter diagnosis)  Comment:   Plan: Influenza A/B antigen           Influenza screen was negative and symptomatic measures were discussed.  She will follow-up if symptoms persist or worsen

## 2018-01-09 NOTE — MR AVS SNAPSHOT
"              After Visit Summary   1/9/2018    Elly Choe    MRN: [id number]           Patient Information     Date Of Birth          1935        Visit Information        Provider Department      1/9/2018 10:40 AM Kali Roa PA-C [facility]        Today's Diagnoses     Viral illness    -  1       Follow-ups after your visit        Follow-up notes from your care team     Return if symptoms worsen or fail to improve.      Your next 10 appointments already scheduled     Jan 31, 2018   Procedure with Sen Parry MD   [facility] (--)    [address] [id number] [phone]           The medical center is located at [address] (between [address] and [address] in [place], [place] north of [place]).              Who to contact     If you have questions or need follow up information about today's clinic visit or your schedule please contact [facility] directly at [phone].  Normal or non-critical lab and imaging results will be communicated to you by eTukTukhart, letter or phone within 4 business days after the clinic has received the results. If you do not hear from us within 7 days, please contact the clinic through Nimble Storaget or phone. If you have a critical or abnormal lab result, we will notify you by phone as soon as possible.  Submit refill requests through Pagido or call your pharmacy and they will forward the refill request to us. Please allow 3 business days for your refill to be completed.          Additional Information About Your Visit        eTukTukharMadeClose Information     Pagido lets you send messages to your doctor, view your test results, renew your prescriptions, schedule appointments and more. To sign up, go to [email or web address] . Click on \"Log in\" on the left side of the screen, which will take you to the Welcome page. Then click on \"Sign up Now\" on the right side of the page.     You will " "be asked to enter the access code listed below, as well as some personal information. Please follow the directions to create your username and password.     Your access code is: [email or web address]  Expires: 2018 11:23 AM     Your access code will  in 90 days. If you need help or a new code, please call your [facility] or [phone].        Care EveryWhere ID     This is your Care EveryWhere ID. This could be used by other organizations to access your [facility] medical records  [id number]        Your Vitals Were     Pulse Temperature Respirations Height Last Period Pulse Oximetry    87 99.1  F (37.3  C) (Tympanic) 12 5' 3\" (1.6 m) 1977 95%    BMI (Body Mass Index)                   41.45 kg/m2            Blood Pressure from Last 3 Encounters:   18 134/60   17 112/62   17 118/70    Weight from Last 3 Encounters:   18 234 lb (106.1 kg)   17 232 lb (105.2 kg)   17 231 lb (104.8 kg)              We Performed the Following     Influenza A/B antigen        Primary Care Provider Office Phone # Fax #    Mirela Yusuf, [facility] [phone]       [place] [address] [place] [address]        Equal Access to Services     JACOB VICENTE : Hadii wendy ku hadasho Soalbina, waaxda luqadaha, qaybta kaalmada ademirtayakurt, isidro finn . So [facility] [phone].    ATENCIÓN: Si habla español, tiene a willis disposición servicios gratuitos de asistencia lingüística. Llame al [phone].    We comply with applicable federal civil rights laws and [place] laws. We do not discriminate on the basis of race, color, national origin, age, disability, sex, sexual orientation, or gender identity.            Thank you!     Thank you for choosing [facility]  for your care. Our goal is always to provide you with excellent care. Hearing back from our patients is one way we can continue to improve our services. Please " take a few minutes to complete the written survey that you may receive in the mail after your visit with us. Thank you!             Your Updated Medication List - Protect others around you: Learn how to safely use, store and throw away your medicines at www.disposemymeds.org.          This list is accurate as of: 1/9/18 11:23 AM.  Always use your most recent med list.                   Brand Name Dispense Instructions for use Diagnosis    allopurinol 100 MG tablet    ZYLOPRIM    90 tablet    TAKE ONE TABLET BY MOUTH EVERY DAY    Gouty arthropathy       aspirin 81 MG EC tablet      Take 1 tablet (81 mg) by mouth daily    CAD (coronary artery disease)       GLUCOSAMINE-CHONDROITIN PO      Take 1 tablet by mouth 2 times daily        levothyroxine 75 MCG tablet    SYNTHROID/LEVOTHROID    90 tablet    Take 1 tablet (75 mcg) by mouth daily    Acquired hypothyroidism       multivitamin, therapeutic with minerals Tabs tablet     100 tablet    Take 1 tablet by mouth daily    Anemia, unspecified anemia type       TYLENOL PO      Take 325 mg by mouth every 4 hours as needed for mild pain or fever

## 2018-01-09 NOTE — NURSING NOTE
"Chief Complaint   Patient presents with     URI       Initial /60 (BP Location: Right arm, Patient Position: Chair, Cuff Size: Adult Regular)  Pulse 87  Temp 99.1  F (37.3  C) (Tympanic)  Resp 12  Ht 5' 3\" (1.6 m)  Wt 234 lb (106.1 kg)  LMP 01/01/1977  SpO2 95%  BMI 41.45 kg/m2 Estimated body mass index is 41.45 kg/(m^2) as calculated from the following:    Height as of this encounter: 5' 3\" (1.6 m).    Weight as of this encounter: 234 lb (106.1 kg).  Medication Reconciliation: complete    Health Maintenance that is potentially due pending provider review:  NONE        Is there anyone who you would like to be able to receive your results? No  If yes have patient fill out CLEO      "

## 2018-01-26 ENCOUNTER — OFFICE VISIT (OUTPATIENT)
Dept: FAMILY MEDICINE | Facility: CLINIC | Age: 83
End: 2018-01-26
Payer: COMMERCIAL

## 2018-01-26 VITALS
SYSTOLIC BLOOD PRESSURE: 132 MMHG | DIASTOLIC BLOOD PRESSURE: 72 MMHG | TEMPERATURE: 97.6 F | BODY MASS INDEX: 40.93 KG/M2 | WEIGHT: 231 LBS | HEART RATE: 72 BPM | HEIGHT: 63 IN | OXYGEN SATURATION: 97 %

## 2018-01-26 DIAGNOSIS — E66.01 MORBID OBESITY DUE TO EXCESS CALORIES (H): ICD-10-CM

## 2018-01-26 DIAGNOSIS — Z01.818 PREOP GENERAL PHYSICAL EXAM: Primary | ICD-10-CM

## 2018-01-26 DIAGNOSIS — I10 BENIGN ESSENTIAL HYPERTENSION: Chronic | ICD-10-CM

## 2018-01-26 DIAGNOSIS — J44.9 CHRONIC OBSTRUCTIVE PULMONARY DISEASE, UNSPECIFIED COPD TYPE (H): ICD-10-CM

## 2018-01-26 DIAGNOSIS — M16.11 PRIMARY OSTEOARTHRITIS OF RIGHT HIP: ICD-10-CM

## 2018-01-26 DIAGNOSIS — N18.30 CKD (CHRONIC KIDNEY DISEASE) STAGE 3, GFR 30-59 ML/MIN (H): Chronic | ICD-10-CM

## 2018-01-26 DIAGNOSIS — I25.10 CORONARY ARTERY DISEASE INVOLVING NATIVE CORONARY ARTERY OF NATIVE HEART WITHOUT ANGINA PECTORIS: Chronic | ICD-10-CM

## 2018-01-26 LAB
ANION GAP SERPL CALCULATED.3IONS-SCNC: 5 MMOL/L (ref 3–14)
BUN SERPL-MCNC: 18 MG/DL (ref 7–30)
CALCIUM SERPL-MCNC: 9 MG/DL (ref 8.5–10.1)
CHLORIDE SERPL-SCNC: 98 MMOL/L (ref 94–109)
CO2 SERPL-SCNC: 29 MMOL/L (ref 20–32)
CREAT SERPL-MCNC: 1.24 MG/DL (ref 0.52–1.04)
ERYTHROCYTE [DISTWIDTH] IN BLOOD BY AUTOMATED COUNT: 12.9 % (ref 10–15)
GFR SERPL CREATININE-BSD FRML MDRD: 42 ML/MIN/1.7M2
GLUCOSE SERPL-MCNC: 96 MG/DL (ref 70–99)
HCT VFR BLD AUTO: 40.1 % (ref 35–47)
HGB BLD-MCNC: 13.5 G/DL (ref 11.7–15.7)
MCH RBC QN AUTO: 32.2 PG (ref 26.5–33)
MCHC RBC AUTO-ENTMCNC: 33.7 G/DL (ref 31.5–36.5)
MCV RBC AUTO: 96 FL (ref 78–100)
PLATELET # BLD AUTO: 187 10E9/L (ref 150–450)
POTASSIUM SERPL-SCNC: 4.4 MMOL/L (ref 3.4–5.3)
RBC # BLD AUTO: 4.19 10E12/L (ref 3.8–5.2)
SODIUM SERPL-SCNC: 132 MMOL/L (ref 133–144)
WBC # BLD AUTO: 6.4 10E9/L (ref 4–11)

## 2018-01-26 PROCEDURE — 99215 OFFICE O/P EST HI 40 MIN: CPT | Performed by: NURSE PRACTITIONER

## 2018-01-26 PROCEDURE — 36415 COLL VENOUS BLD VENIPUNCTURE: CPT | Performed by: NURSE PRACTITIONER

## 2018-01-26 PROCEDURE — 85027 COMPLETE CBC AUTOMATED: CPT | Performed by: NURSE PRACTITIONER

## 2018-01-26 PROCEDURE — 93000 ELECTROCARDIOGRAM COMPLETE: CPT | Performed by: NURSE PRACTITIONER

## 2018-01-26 PROCEDURE — 80048 BASIC METABOLIC PNL TOTAL CA: CPT | Performed by: NURSE PRACTITIONER

## 2018-01-26 NOTE — PROGRESS NOTES
[facility]  [address] [place] [id number]  [phone]  Dept: [phone]    PRE-OP EVALUATION:  Today's date: 2018    Elly Choe (: 1935) presents for pre-operative evaluation assessment as requested by Dr. Parry.  She requires evaluation and anesthesia risk assessment prior to undergoing surgery/procedure for treatment of right hip pain .  Proposed procedure: Right total hip arthroplasty.     Date of Surgery/ Procedure: 2018  Time of Surgery/ Procedure: 3:00  Hospital/Surgical Facility: [facility]  Primary Physician: Joy Kam  Type of Anesthesia Anticipated: Choice    Patient has a Health Care Directive or Living Will:  YES     Preop Questions 2018   1.  Do you have a history of heart attack, stroke, stent, bypass or surgery on an artery in the head, neck, heart or legs? YES-stent    2.  Do you ever have any pain or discomfort in your chest? No   3.  Do you have a history of  Heart Failure? No   4.   Are you troubled by shortness of breath when:  walking on a level surface, or up a slight hill, or at night? No   5.  Do you currently have a cold, bronchitis or other respiratory infection? No   6.  Do you have a cough, shortness of breath, or wheezing? No   7.  Do you sometimes get pains in the calves of your legs when you walk? No   8. Do you or anyone in your family have previous history of blood clots? No   9.  Do you or does anyone in your family have a serious bleeding problem such as prolonged bleeding following surgeries or cuts? No   10. Have you ever had problems with anemia or been told to take iron pills? No   11. Have you had any abnormal blood loss such as black, tarry or bloody stools, or abnormal vaginal bleeding? No   12. Have you ever had a blood transfusion? No   13. Have you or any of your relatives ever had problems with anesthesia? No   14. Do you have sleep apnea, excessive snoring or daytime drowsiness? No    15. Do you have any prosthetic heart valves? No   16. Do you have prosthetic joints? YES - knees and shoulder   17. Is there any chance that you may be pregnant? No         HPI:                                                      Brief HPI related to upcoming procedure: ongoing right hip pain with osteoarthritis present in need of right total hip arthroplasty      See problem list for active medical problems.  Problems all longstanding and stable, except as noted/documented.  See ROS for pertinent symptoms related to these conditions.                                                                                                  .    MEDICAL HISTORY:                                                      Patient Active Problem List    Diagnosis Date Noted     Status post total shoulder arthroplasty, left 05/04/2016     Priority: Medium     Osteopenia 03/03/2015     Priority: Medium     Feb 2015 dexa with mostly mild osteopenia.  Repeat in 3-5 yrs.       FH: rheumatoid arthritis 02/12/2015     Priority: Medium     Negative RF, DEVON, CCP 3/26/13  Never tested, but mother had RA and Elly has classic hand deformities.  She does not want to hear about it.       Coronary artery disease involving native coronary artery of native heart without angina pectoris 06/18/2014     Priority: Medium     4/15/2014  Cardiac catheterization-2 drug eluting stents placed in left main artery, moderated disease in LAD and circumflex, mild RCA, normal EF  On Brilinta for one year (dcd 4/28/15)           S/P PTCA (percutaneous transluminal coronary angioplasty) 06/18/2014     Priority: Medium     Health Care Home 04/17/2014     Priority: Medium       Status:  No Services Needed  Care Coordinator:  Yaa Nelson    See Letters for [facility] Care Plan  Date:  February 9, 2016               Generalized anxiety disorder 09/08/2013     Priority: Medium     Diagnosis updated by automated process. Provider to review and confirm.       S/P knee  replacement left 01/02/2013     Priority: Medium     Primary localized osteoarthrosis, lower leg 12/26/2012     Priority: Medium     Anemia 12/06/2012     Priority: Medium     Status post shoulder joint replacement 10/05/2012     Priority: Medium     Phlebitis and thrombophlebitis of superficial vessels of lower extremities 10/02/2012     Priority: Medium     DJD of shoulder 09/26/2012     Priority: Medium     Acquired hypothyroidism 09/12/2012     Priority: Medium     Advanced directives, counseling/discussion 04/12/2012     Priority: Medium     Advance Directive received and scanned. Click on Code in the patient header to view. 4/12/2012     DNI/DNR         CKD (chronic kidney disease) stage 3, GFR 30-59 ml/min 03/30/2011     Priority: Medium     Acute dermatitis due to solar radiation 09/09/2010     Priority: Medium     Idiopathic chronic gout of multiple sites without tophus 06/03/2007     Priority: Medium     Josselin 3, 2007: right great toe with elevated uric acid, started on -ALLOPURINOL 100 MG daily. recheck uric acid 2 week after starting medication and titate as needed.  Problem list name updated by automated process. Provider to review and confirm  Imo Update utility       surgical menopause (SARAH BSO) for non-cancerous reasons 01/08/2007     Priority: Medium     Age 41       Hemangioma 05/17/2005     Priority: Medium     Area on chest subcutaneous between breast tissue  Problem list name updated by automated process. Provider to review       Benign essential hypertension 05/17/2005     Priority: Medium     Diverticulitis of colon 05/17/2005     Priority: Medium     Problem list name updated by automated process. Provider to review       Hyperlipidemia LDL goal <100 10/31/2010     Priority: Low     Chronic airway obstruction (H) 01/13/2008     Priority: Low     9/22/2010:She has been told there is lung damage from smoking over the years but has not been limited by any respiratory symptoms.  Not using any  inhalers.   Problem list name updated by automated process. Provider to review       Morbid obesity due to excess calories (H) 01/08/2007     Priority: Low     Problem list name updated by automated process. Provider to review       Gastroesophageal reflux disease without esophagitis 05/17/2005     Priority: Low      Past Medical History:   Diagnosis Date     ACS (acute coronary syndrome) (H) 4/14/2014     Anemia 12/6/2012     Arthritis      Chronic kidney disease, stage III (moderate) 10/2/2012     CKD (chronic kidney disease) stage 3, GFR 30-59 ml/min 3/30/2011     COPD (chronic obstructive pulmonary disease) (H)      Generalised anxiety disorder 9/8/2013     GERD 5/17/2005     HX OF COMPOUND HEMANGIOMA NOS [228.00]      HX OF SUPERFICIAL PHLEBITIS      Hyperlipidemia LDL goal <100 10/31/2010     Hypertension      Hypertension goal BP (blood pressure) < 140/90 5/17/2005     Hypothyroidism 9/12/2012     Obesity, unspecified 1/8/2007     Phlebitis and thrombophlebitis of superficial vessels of lower extremities 10/2/2012     Primary localized osteoarthrosis, lower leg 12/26/2012     S/P knee replacement left 1/2/2013     Thyroid disease      Unspecified hypothyroidism 10/2/2012     Past Surgical History:   Procedure Laterality Date     ARTHROPLASTY KNEE  4/13/2011    Procedure:ARTHROPLASTY KNEE; Surgeon:SEN PARRY; Location:[facility]     ARTHROPLASTY KNEE  12/26/2012    Procedure: ARTHROPLASTY KNEE;  Left Total Knee Arthroplasty;  Surgeon: Sen Parry MD;  Location: [facility]     ARTHROPLASTY SHOULDER  9/26/2012    Procedure: ARTHROPLASTY SHOULDER;  Right total shoulder arthroplasty;  Surgeon: Sen Parry MD;  Location: [facility]     ARTHROPLASTY SHOULDER Left 5/4/2016    Procedure: ARTHROPLASTY SHOULDER;  Surgeon: Sen Parry MD;  Location: [facility]     ARTHROSCOPY KNEE IRRIGATION AND DEBRIDEMENT  12/10/2010    ARTHROSCOPY KNEE IRRIGATION AND DEBRIDEMENT performed by LEY, JEFFREY DUANE at [facility]      CATARACT IOL, RT/LT  4/2010    bilateral     COLONOSCOPY  2002     COLONOSCOPY  6/19/2014    Procedure: COLONOSCOPY;  Surgeon: Steve Deng MD;  Location: [facility]     CORONARY ANGIOGRAPHY ADULT ORDER       HYSTERECTOMY, SARAH  1976    Total abdominal hysterectomy & bilateral salpingectomy oophorectomy     TONSILLECTOMY & ADENOIDECTOMY      as child     Current Outpatient Prescriptions   Medication Sig Dispense Refill     allopurinol (ZYLOPRIM) 100 MG tablet TAKE ONE TABLET BY MOUTH EVERY DAY 90 tablet 3     Acetaminophen (TYLENOL PO) Take 325 mg by mouth every 4 hours as needed for mild pain or fever       GLUCOSAMINE-CHONDROITIN PO Take 1 tablet by mouth 2 times daily       levothyroxine (SYNTHROID/LEVOTHROID) 75 MCG tablet Take 1 tablet (75 mcg) by mouth daily 90 tablet 3     multivitamin, therapeutic with minerals (MULTI-VITAMIN) TABS Take 1 tablet by mouth daily 100 tablet 3     aspirin EC 81 MG EC tablet Take 1 tablet (81 mg) by mouth daily       OTC products: None, except as noted above    Allergies   Allergen Reactions     Sulfa Drugs Other (See Comments)     Causes burning when urinates. Causes yeast infections.     Percocet [Oxycodone-Acetaminophen] Other (See Comments)     Freaked out and broke a toilet at [facility]      Latex Allergy: NO    Social History   Substance Use Topics     Smoking status: Former Smoker     Quit date: 1/1/1970     Smokeless tobacco: Never Used      Comment: 20 year hx of smoking 3 packs of cigarettes daily; quit 1970     Alcohol use No     History   Drug Use No       REVIEW OF SYSTEMS:                                                    C: NEGATIVE for fever, chills, change in weight  I: NEGATIVE for worrisome rashes, moles or lesions  E: NEGATIVE for vision changes or irritation  E/M: NEGATIVE for ear, mouth and throat problems  R: NEGATIVE for significant cough or SOB  B: NEGATIVE for masses, tenderness or discharge  CV: NEGATIVE for chest pain, palpitations or peripheral  "edema  GI: NEGATIVE for nausea, abdominal pain, heartburn, or change in bowel habits  : NEGATIVE for frequency, dysuria, or hematuria  MUSCULOSKELETAL:POSITIVE  for joint pain right hip  N: NEGATIVE for weakness, dizziness or paresthesias  E: NEGATIVE for temperature intolerance, skin/hair changes  H: NEGATIVE for bleeding problems  P: NEGATIVE for changes in mood or affect    EXAM:                                                    /72 (Cuff Size: Adult Large)  Pulse 72  Temp 97.6  F (36.4  C) (Tympanic)  Ht 5' 3\" (1.6 m)  Wt 231 lb (104.8 kg)  LMP 01/01/1977  SpO2 97%  BMI 40.92 kg/m2    GENERAL APPEARANCE: healthy, alert and no distress     EYES: EOMI, PERRL     HENT: ear canals and TM's normal and nose and mouth without ulcers or lesions     NECK: no adenopathy, no asymmetry, masses, or scars and thyroid normal to palpation     RESP: lungs clear to auscultation - no rales, rhonchi or wheezes     CV: regular rates and rhythm, normal S1 S2, no S3 or S4 and no murmur, click or rub     ABDOMEN:  soft, nontender, no HSM or masses and bowel sounds normal     MS: extremities normal- no gross deformities noted     SKIN: no suspicious lesions or rashes     NEURO: Normal strength and tone, sensory exam grossly normal, mentation intact and speech normal     PSYCH: mentation appears normal. and affect normal/bright    DIAGNOSTICS:                                                    EKG: appears normal, NSR, normal axis, normal intervals, no acute ST/T changes c/w ischemia, no LVH by voltage criteria    Results for orders placed or performed in visit on 01/26/18   CBC with platelets   Result Value Ref Range    WBC 6.4 4.0 - 11.0 10e9/L    RBC Count 4.19 3.8 - 5.2 10e12/L    Hemoglobin 13.5 11.7 - 15.7 g/dL    Hematocrit 40.1 35.0 - 47.0 %    MCV 96 78 - 100 fl    MCH 32.2 26.5 - 33.0 pg    MCHC 33.7 31.5 - 36.5 g/dL    RDW 12.9 10.0 - 15.0 %    Platelet Count 187 150 - 450 10e9/L   Basic metabolic panel "   Result Value Ref Range    Sodium 132 (L) 133 - 144 mmol/L    Potassium 4.4 3.4 - 5.3 mmol/L    Chloride 98 94 - 109 mmol/L    Carbon Dioxide 29 20 - 32 mmol/L    Anion Gap 5 3 - 14 mmol/L    Glucose 96 70 - 99 mg/dL    Urea Nitrogen 18 7 - 30 mg/dL    Creatinine 1.24 (H) 0.52 - 1.04 mg/dL    GFR Estimate 42 (L) >60 mL/min/1.7m2    GFR Estimate If Black 50 (L) >60 mL/min/1.7m2    Calcium 9.0 8.5 - 10.1 mg/dL     IMPRESSION:                                                    Reason for surgery/procedure: ongoing right hip pain with osteoarthritis present in need of right total hip arthroplasty    The proposed surgical procedure is considered INTERMEDIATE risk.    REVISED CARDIAC RISK INDEX  The patient has the following serious cardiovascular risks for perioperative complications such as (MI, PE, VFib and 3  AV Block):  Coronary Artery Disease (MI, positive stress test, angina, Qs on EKG)  INTERPRETATION: 1 risks: Class II (low risk - 0.9% complication rate)    The patient has the following additional risks for perioperative complications:  No identified additional risks      ICD-10-CM    1. Preop general physical exam Z01.818 EKG 12-lead complete w/read - Clinics   2. Primary osteoarthritis of right hip M16.11    3. Coronary artery disease involving native coronary artery of native heart without angina pectoris I25.10 EKG 12-lead complete w/read - Clinics   4. Morbid obesity due to excess calories (H) E66.01    5. Chronic obstructive pulmonary disease, unspecified COPD type (H) J44.9    6. Benign essential hypertension I10    7. CKD (chronic kidney disease) stage 3, GFR 30-59 ml/min N18.3        RECOMMENDATIONS:                                                      --Consult hospital rounder / IM to assist post-op medical management    Cardiovascular Risk  Beta blockers not indicated. No chest pain or shortness of breath with activity, EKG normal.      Pulmonary Risk  Incentive spirometry post op  Respiratory  Therapy (Respiratory Care IP Consult)  post op      --Patient is to take all scheduled medications on the day of surgery EXCEPT for modifications listed below.    Anticoagulant or Antiplatelet Medication Use  ASPIRIN: Discontinue ASA 7-10 days prior to procedure to reduce bleeding risk.  It should be resumed post-operatively.        APPROVAL GIVEN to proceed with proposed procedure, without further diagnostic evaluation       Signed Electronically by: ROSA Patel CNP    Copy of this evaluation report is provided to requesting physician.    [facility] Preop Guidelines

## 2018-01-26 NOTE — MR AVS SNAPSHOT
After Visit Summary   1/26/2018    Elly Choe    MRN: [id number]           Patient Information     Date Of Birth          1935        Visit Information        Provider Department      1/26/2018 10:40 AM Joy Kam APRN [facility]        Today's Diagnoses     Preop general physical exam    -  1    Primary osteoarthritis of right hip        Coronary artery disease involving native coronary artery of native heart without angina pectoris        Morbid obesity due to excess calories (H)        Chronic obstructive pulmonary disease, unspecified COPD type (H)        Benign essential hypertension        CKD (chronic kidney disease) stage 3, GFR 30-59 ml/min          Care Instructions    Hold your glucosamine and Aspirin 1 week prior to surgery  Before Your Surgery      Call your surgeon if there is any change in your health. This includes signs of a cold or flu (such as a sore throat, runny nose, cough, rash or fever).    Do not smoke, drink alcohol or take over the counter medicine (unless your surgeon or primary care doctor tells you to) for the 24 hours before and after surgery.    If you take prescribed drugs: Follow your doctor s orders about which medicines to take and which to stop until after surgery.    Eating and drinking prior to surgery: follow the instructions from your surgeon    Take a shower or bath the night before surgery. Use the soap your surgeon gave you to gently clean your skin. If you do not have soap from your surgeon, use your regular soap. Do not shave or scrub the surgery site.  Wear clean pajamas and have clean sheets on your bed.           Follow-ups after your visit        Your next 10 appointments already scheduled     Jan 31, 2018   Procedure with Sen Parry MD   [facility] (--)    [address] [place] [id number]   [phone]           The medical center is located at [address] [place] [address].  "(between [address] in [place], [place] north of [place]).              Who to contact     If you have questions or need follow up information about today's clinic visit or your schedule please contact [facility] directly at [phone].  Normal or non-critical lab and imaging results will be communicated to you by MyChart, letter or phone within 4 business days after the clinic has received the results. If you do not hear from us within 7 days, please contact the clinic through MyChart or phone. If you have a critical or abnormal lab result, we will notify you by phone as soon as possible.  Submit refill requests through [employer] or call your pharmacy and they will forward the refill request to us. Please allow 3 business days for your refill to be completed.          Additional Information About Your Visit        MyChart Information     [employer] lets you send messages to your doctor, view your test results, renew your prescriptions, schedule appointments and more. To sign up, go to [email or web address] . Click on \"Log in\" on the left side of the screen, which will take you to the Welcome page. Then click on \"Sign up Now\" on the right side of the page.     You will be asked to enter the access code listed below, as well as some personal information. Please follow the directions to create your username and password.     Your access code is: [email or web address]  Expires: 2018 11:23 AM     Your access code will  in 90 days. If you need help or a new code, please call your [facility] or [phone].        Care EveryWhere ID     This is your Care EveryWhere ID. This could be used by other organizations to access your [place] medical records  [id number]        Your Vitals Were     Pulse Temperature Height Last Period Pulse Oximetry BMI (Body Mass Index)    72 97.6  F (36.4  C) (Tympanic) 5' 3\" (1.6 m) 1977 97% 40.92 kg/m2       Blood Pressure from Last 3 Encounters: "   01/26/18 132/72   01/09/18 134/60   12/19/17 112/62    Weight from Last 3 Encounters:   01/26/18 231 lb (104.8 kg)   01/09/18 234 lb (106.1 kg)   12/19/17 232 lb (105.2 kg)              We Performed the Following     Basic metabolic panel     CBC with platelets     EKG 12-lead complete w/read - Clinics        Primary Care Provider Office Phone # Fax #    ROSA Steven [phone] [id number] [id number]        Equal Access to Services     [facility] : Hadii wenyd ku hadasho Soomaali, waaxda luqadaha, qaybta kaalmada ademirtayakurt, isidro finn . So [facility] [phone].    ATENCIÓN: Si habla español, tiene a willis disposición servicios gratuitos de asistencia lingüística. Ll[facility] [phone].    We comply with applicable federal civil rights laws and [place] laws. We do not discriminate on the basis of race, color, national origin, age, disability, sex, sexual orientation, or gender identity.            Thank you!     Thank you for choosing [facility]  for your care. Our goal is always to provide you with excellent care. Hearing back from our patients is one way we can continue to improve our services. Please take a few minutes to complete the written survey that you may receive in the mail after your visit with us. Thank you!             Your Updated Medication List - Protect others around you: Learn how to safely use, store and throw away your medicines at www.disposemymeds.org.          This list is accurate as of 1/26/18 11:10 AM.  Always use your most recent med list.                   Brand Name Dispense Instructions for use Diagnosis    allopurinol 100 MG tablet    ZYLOPRIM    90 tablet    TAKE ONE TABLET BY MOUTH EVERY DAY    Gouty arthropathy       aspirin 81 MG EC tablet      Take 1 tablet (81 mg) by mouth daily    CAD (coronary artery disease)       GLUCOSAMINE-CHONDROITIN PO      Take 1 tablet by mouth 2 times  daily        levothyroxine 75 MCG tablet    SYNTHROID/LEVOTHROID    90 tablet    Take 1 tablet (75 mcg) by mouth daily    Acquired hypothyroidism       multivitamin, therapeutic with minerals Tabs tablet     100 tablet    Take 1 tablet by mouth daily    Anemia, unspecified anemia type       TYLENOL PO      Take 325 mg by mouth every 4 hours as needed for mild pain or fever

## 2018-01-26 NOTE — NURSING NOTE
"Chief Complaint   Patient presents with     Pre-Op Exam       Initial /72 (Cuff Size: Adult Large)  Pulse 72  Temp 97.6  F (36.4  C) (Tympanic)  Ht 5' 3\" (1.6 m)  Wt 231 lb (104.8 kg)  LMP 01/01/1977  SpO2 97%  BMI 40.92 kg/m2 Estimated body mass index is 40.92 kg/(m^2) as calculated from the following:    Height as of this encounter: 5' 3\" (1.6 m).    Weight as of this encounter: 231 lb (104.8 kg).  Medication Reconciliation: complete    Health Maintenance that is potentially due pending provider review:  NONE    n/a    Malina Metcalf, CMA        "

## 2018-01-29 ENCOUNTER — ANESTHESIA EVENT (OUTPATIENT)
Dept: SURGERY | Facility: CLINIC | Age: 83
DRG: 470 | End: 2018-01-29
Payer: COMMERCIAL

## 2018-01-31 ENCOUNTER — ANESTHESIA (OUTPATIENT)
Dept: SURGERY | Facility: CLINIC | Age: 83
DRG: 470 | End: 2018-01-31
Payer: COMMERCIAL

## 2018-01-31 ENCOUNTER — APPOINTMENT (OUTPATIENT)
Dept: GENERAL RADIOLOGY | Facility: CLINIC | Age: 83
DRG: 470 | End: 2018-01-31
Attending: ORTHOPAEDIC SURGERY
Payer: COMMERCIAL

## 2018-01-31 ENCOUNTER — HOSPITAL ENCOUNTER (INPATIENT)
Facility: CLINIC | Age: 83
LOS: 3 days | Discharge: NURSING, REHAB OR LONG-TERM CARE | DRG: 470 | End: 2018-02-03
Attending: ORTHOPAEDIC SURGERY | Admitting: ORTHOPAEDIC SURGERY
Payer: COMMERCIAL

## 2018-01-31 DIAGNOSIS — M17.11 PRIMARY LOCALIZED OSTEOARTHROSIS OF RIGHT LOWER LEG: Primary | ICD-10-CM

## 2018-01-31 PROBLEM — Z96.641 STATUS POST TOTAL REPLACEMENT OF RIGHT HIP: Status: ACTIVE | Noted: 2018-01-31

## 2018-01-31 PROBLEM — M16.9 DEGENERATIVE JOINT DISEASE (DJD) OF HIP: Status: ACTIVE | Noted: 2018-01-31

## 2018-01-31 LAB
BASOPHILS # BLD AUTO: 0 10E9/L (ref 0–0.2)
BASOPHILS NFR BLD AUTO: 0.6 %
DIFFERENTIAL METHOD BLD: NORMAL
EOSINOPHIL # BLD AUTO: 0.4 10E9/L (ref 0–0.7)
EOSINOPHIL NFR BLD AUTO: 7 %
ERYTHROCYTE [DISTWIDTH] IN BLOOD BY AUTOMATED COUNT: 12.9 % (ref 10–15)
HCT VFR BLD AUTO: 41.4 % (ref 35–47)
HGB BLD-MCNC: 14.4 G/DL (ref 11.7–15.7)
IMM GRANULOCYTES # BLD: 0 10E9/L (ref 0–0.4)
IMM GRANULOCYTES NFR BLD: 0.2 %
INR PPP: 0.99 (ref 0.86–1.14)
LYMPHOCYTES # BLD AUTO: 1.3 10E9/L (ref 0.8–5.3)
LYMPHOCYTES NFR BLD AUTO: 26.4 %
MCH RBC QN AUTO: 32.5 PG (ref 26.5–33)
MCHC RBC AUTO-ENTMCNC: 34.8 G/DL (ref 31.5–36.5)
MCV RBC AUTO: 94 FL (ref 78–100)
MONOCYTES # BLD AUTO: 0.4 10E9/L (ref 0–1.3)
MONOCYTES NFR BLD AUTO: 7.2 %
NEUTROPHILS # BLD AUTO: 2.9 10E9/L (ref 1.6–8.3)
NEUTROPHILS NFR BLD AUTO: 58.6 %
PLATELET # BLD AUTO: 159 10E9/L (ref 150–450)
RBC # BLD AUTO: 4.43 10E12/L (ref 3.8–5.2)
WBC # BLD AUTO: 5 10E9/L (ref 4–11)

## 2018-01-31 PROCEDURE — 37000008 ZZH ANESTHESIA TECHNICAL FEE, 1ST 30 MIN: Performed by: ORTHOPAEDIC SURGERY

## 2018-01-31 PROCEDURE — 36000093 ZZH SURGERY LEVEL 4 1ST 30 MIN: Performed by: ORTHOPAEDIC SURGERY

## 2018-01-31 PROCEDURE — 40000986 XR PELVIS AND HIP RIGHT 1 VIEW

## 2018-01-31 PROCEDURE — 12000007 ZZH R&B INTERMEDIATE

## 2018-01-31 PROCEDURE — 71000012 ZZH RECOVERY PHASE 1 LEVEL 1 FIRST HR: Performed by: ORTHOPAEDIC SURGERY

## 2018-01-31 PROCEDURE — 25000128 H RX IP 250 OP 636: Performed by: NURSE ANESTHETIST, CERTIFIED REGISTERED

## 2018-01-31 PROCEDURE — 36415 COLL VENOUS BLD VENIPUNCTURE: CPT | Performed by: PHYSICIAN ASSISTANT

## 2018-01-31 PROCEDURE — 27210794 ZZH OR GENERAL SUPPLY STERILE: Performed by: ORTHOPAEDIC SURGERY

## 2018-01-31 PROCEDURE — 25000125 ZZHC RX 250: Performed by: NURSE ANESTHETIST, CERTIFIED REGISTERED

## 2018-01-31 PROCEDURE — 85610 PROTHROMBIN TIME: CPT | Performed by: PHYSICIAN ASSISTANT

## 2018-01-31 PROCEDURE — 25000128 H RX IP 250 OP 636: Performed by: PHYSICIAN ASSISTANT

## 2018-01-31 PROCEDURE — 37000009 ZZH ANESTHESIA TECHNICAL FEE, EACH ADDTL 15 MIN: Performed by: ORTHOPAEDIC SURGERY

## 2018-01-31 PROCEDURE — 0SR90JA REPLACEMENT OF RIGHT HIP JOINT WITH SYNTHETIC SUBSTITUTE, UNCEMENTED, OPEN APPROACH: ICD-10-PCS | Performed by: ORTHOPAEDIC SURGERY

## 2018-01-31 PROCEDURE — 85025 COMPLETE CBC W/AUTO DIFF WBC: CPT | Performed by: PHYSICIAN ASSISTANT

## 2018-01-31 PROCEDURE — 25000125 ZZHC RX 250: Performed by: PHYSICIAN ASSISTANT

## 2018-01-31 PROCEDURE — 25000132 ZZH RX MED GY IP 250 OP 250 PS 637: Performed by: PHYSICIAN ASSISTANT

## 2018-01-31 PROCEDURE — 72170 X-RAY EXAM OF PELVIS: CPT

## 2018-01-31 PROCEDURE — 25000128 H RX IP 250 OP 636: Performed by: ORTHOPAEDIC SURGERY

## 2018-01-31 PROCEDURE — 36000063 ZZH SURGERY LEVEL 4 EA 15 ADDTL MIN: Performed by: ORTHOPAEDIC SURGERY

## 2018-01-31 PROCEDURE — C1776 JOINT DEVICE (IMPLANTABLE): HCPCS | Performed by: ORTHOPAEDIC SURGERY

## 2018-01-31 PROCEDURE — 40000305 ZZH STATISTIC PRE PROC ASSESS I: Performed by: ORTHOPAEDIC SURGERY

## 2018-01-31 PROCEDURE — 25000132 ZZH RX MED GY IP 250 OP 250 PS 637: Performed by: ORTHOPAEDIC SURGERY

## 2018-01-31 DEVICE — IMP HEAD FEMORAL STRK BIOLOX DELTA CERAMIC 32MM 0MM: Type: IMPLANTABLE DEVICE | Site: HIP | Status: FUNCTIONAL

## 2018-01-31 DEVICE — IMP LINER STRK TRIDENT X3 POLY 32MM 10DEG SZ D 623-10-32D: Type: IMPLANTABLE DEVICE | Site: HIP | Status: FUNCTIONAL

## 2018-01-31 DEVICE — IMP SCR BONE STRK TORX 6.5X30MM CAN 2030-6530-1: Type: IMPLANTABLE DEVICE | Site: HIP | Status: FUNCTIONAL

## 2018-01-31 DEVICE — IMPLANTABLE DEVICE: Type: IMPLANTABLE DEVICE | Site: HIP | Status: FUNCTIONAL

## 2018-01-31 DEVICE — IMP STEM FEMORAL HIP STRK ACCOLADE II 127DEG SZ 5 6721-0535: Type: IMPLANTABLE DEVICE | Site: HIP | Status: FUNCTIONAL

## 2018-01-31 DEVICE — IMP SCR BONE STRK TORX 6.5X25MM CAN 2030-6525-1: Type: IMPLANTABLE DEVICE | Site: HIP | Status: FUNCTIONAL

## 2018-01-31 RX ORDER — ACETAMINOPHEN 325 MG/1
975 TABLET ORAL EVERY 8 HOURS
Status: DISCONTINUED | OUTPATIENT
Start: 2018-01-31 | End: 2018-02-03 | Stop reason: HOSPADM

## 2018-01-31 RX ORDER — ALLOPURINOL 100 MG/1
100 TABLET ORAL DAILY
Status: DISCONTINUED | OUTPATIENT
Start: 2018-01-31 | End: 2018-02-03 | Stop reason: HOSPADM

## 2018-01-31 RX ORDER — ONDANSETRON 2 MG/ML
INJECTION INTRAMUSCULAR; INTRAVENOUS PRN
Status: DISCONTINUED | OUTPATIENT
Start: 2018-01-31 | End: 2018-01-31

## 2018-01-31 RX ORDER — HYDRALAZINE HYDROCHLORIDE 20 MG/ML
2.5-5 INJECTION INTRAMUSCULAR; INTRAVENOUS EVERY 10 MIN PRN
Status: DISCONTINUED | OUTPATIENT
Start: 2018-01-31 | End: 2018-01-31 | Stop reason: HOSPADM

## 2018-01-31 RX ORDER — ACETAMINOPHEN 325 MG/1
650 TABLET ORAL EVERY 4 HOURS PRN
Status: DISCONTINUED | OUTPATIENT
Start: 2018-02-03 | End: 2018-02-03 | Stop reason: HOSPADM

## 2018-01-31 RX ORDER — ACETAMINOPHEN 325 MG/1
325 TABLET ORAL EVERY 4 HOURS PRN
Status: DISCONTINUED | OUTPATIENT
Start: 2018-01-31 | End: 2018-01-31

## 2018-01-31 RX ORDER — ACETAMINOPHEN 500 MG
1000 TABLET ORAL ONCE
Status: DISCONTINUED | OUTPATIENT
Start: 2018-01-31 | End: 2018-01-31 | Stop reason: HOSPADM

## 2018-01-31 RX ORDER — HYDROMORPHONE HYDROCHLORIDE 1 MG/ML
.3-.5 SOLUTION ORAL
Status: DISCONTINUED | OUTPATIENT
Start: 2018-01-31 | End: 2018-01-31 | Stop reason: CLARIF

## 2018-01-31 RX ORDER — MEPERIDINE HYDROCHLORIDE 25 MG/ML
12.5 INJECTION INTRAMUSCULAR; INTRAVENOUS; SUBCUTANEOUS EVERY 5 MIN PRN
Status: DISCONTINUED | OUTPATIENT
Start: 2018-01-31 | End: 2018-01-31 | Stop reason: HOSPADM

## 2018-01-31 RX ORDER — LIDOCAINE 40 MG/G
CREAM TOPICAL
Status: DISCONTINUED | OUTPATIENT
Start: 2018-01-31 | End: 2018-01-31 | Stop reason: HOSPADM

## 2018-01-31 RX ORDER — EPHEDRINE SULFATE 50 MG/ML
INJECTION, SOLUTION INTRAVENOUS PRN
Status: DISCONTINUED | OUTPATIENT
Start: 2018-01-31 | End: 2018-01-31

## 2018-01-31 RX ORDER — MULTIPLE VITAMINS W/ MINERALS TAB 9MG-400MCG
1 TAB ORAL DAILY
Status: DISCONTINUED | OUTPATIENT
Start: 2018-01-31 | End: 2018-02-03 | Stop reason: HOSPADM

## 2018-01-31 RX ORDER — NALOXONE HYDROCHLORIDE 0.4 MG/ML
.1-.4 INJECTION, SOLUTION INTRAMUSCULAR; INTRAVENOUS; SUBCUTANEOUS
Status: DISCONTINUED | OUTPATIENT
Start: 2018-01-31 | End: 2018-02-03 | Stop reason: HOSPADM

## 2018-01-31 RX ORDER — CEFAZOLIN SODIUM 2 G/100ML
2 INJECTION, SOLUTION INTRAVENOUS
Status: DISCONTINUED | OUTPATIENT
Start: 2018-01-31 | End: 2018-01-31

## 2018-01-31 RX ORDER — FENTANYL CITRATE 50 UG/ML
25-50 INJECTION, SOLUTION INTRAMUSCULAR; INTRAVENOUS
Status: DISCONTINUED | OUTPATIENT
Start: 2018-01-31 | End: 2018-01-31 | Stop reason: HOSPADM

## 2018-01-31 RX ORDER — DEXAMETHASONE SODIUM PHOSPHATE 4 MG/ML
INJECTION, SOLUTION INTRA-ARTICULAR; INTRALESIONAL; INTRAMUSCULAR; INTRAVENOUS; SOFT TISSUE PRN
Status: DISCONTINUED | OUTPATIENT
Start: 2018-01-31 | End: 2018-01-31

## 2018-01-31 RX ORDER — ZOLPIDEM TARTRATE 5 MG/1
5 TABLET ORAL
Status: DISCONTINUED | OUTPATIENT
Start: 2018-02-01 | End: 2018-02-03 | Stop reason: HOSPADM

## 2018-01-31 RX ORDER — FENTANYL CITRATE 50 UG/ML
INJECTION, SOLUTION INTRAMUSCULAR; INTRAVENOUS PRN
Status: DISCONTINUED | OUTPATIENT
Start: 2018-01-31 | End: 2018-01-31

## 2018-01-31 RX ORDER — ACETAMINOPHEN 500 MG
1000 TABLET ORAL ONCE
Status: COMPLETED | OUTPATIENT
Start: 2018-01-31 | End: 2018-01-31

## 2018-01-31 RX ORDER — CEFAZOLIN SODIUM 2 G/100ML
2 INJECTION, SOLUTION INTRAVENOUS
Status: DISCONTINUED | OUTPATIENT
Start: 2018-01-31 | End: 2018-01-31 | Stop reason: HOSPADM

## 2018-01-31 RX ORDER — ONDANSETRON 2 MG/ML
4 INJECTION INTRAMUSCULAR; INTRAVENOUS EVERY 30 MIN PRN
Status: DISCONTINUED | OUTPATIENT
Start: 2018-01-31 | End: 2018-01-31 | Stop reason: HOSPADM

## 2018-01-31 RX ORDER — SODIUM CHLORIDE, SODIUM LACTATE, POTASSIUM CHLORIDE, CALCIUM CHLORIDE 600; 310; 30; 20 MG/100ML; MG/100ML; MG/100ML; MG/100ML
INJECTION, SOLUTION INTRAVENOUS CONTINUOUS
Status: DISCONTINUED | OUTPATIENT
Start: 2018-01-31 | End: 2018-01-31 | Stop reason: HOSPADM

## 2018-01-31 RX ORDER — ASPIRIN 325 MG
325 TABLET ORAL
Status: DISCONTINUED | OUTPATIENT
Start: 2018-01-31 | End: 2018-01-31

## 2018-01-31 RX ORDER — NALOXONE HYDROCHLORIDE 0.4 MG/ML
.1-.4 INJECTION, SOLUTION INTRAMUSCULAR; INTRAVENOUS; SUBCUTANEOUS
Status: DISCONTINUED | OUTPATIENT
Start: 2018-01-31 | End: 2018-01-31

## 2018-01-31 RX ORDER — ASPIRIN 325 MG
325 TABLET ORAL DAILY
Status: DISCONTINUED | OUTPATIENT
Start: 2018-02-01 | End: 2018-02-03 | Stop reason: HOSPADM

## 2018-01-31 RX ORDER — GABAPENTIN 100 MG/1
100 CAPSULE ORAL
Status: DISCONTINUED | OUTPATIENT
Start: 2018-01-31 | End: 2018-01-31 | Stop reason: HOSPADM

## 2018-01-31 RX ORDER — AMOXICILLIN 250 MG
1-2 CAPSULE ORAL 2 TIMES DAILY
Status: DISCONTINUED | OUTPATIENT
Start: 2018-01-31 | End: 2018-02-03 | Stop reason: HOSPADM

## 2018-01-31 RX ORDER — ONDANSETRON 4 MG/1
4 TABLET, ORALLY DISINTEGRATING ORAL EVERY 6 HOURS PRN
Status: DISCONTINUED | OUTPATIENT
Start: 2018-01-31 | End: 2018-02-03 | Stop reason: HOSPADM

## 2018-01-31 RX ORDER — PROCHLORPERAZINE MALEATE 5 MG
5 TABLET ORAL EVERY 6 HOURS PRN
Status: DISCONTINUED | OUTPATIENT
Start: 2018-01-31 | End: 2018-02-03 | Stop reason: HOSPADM

## 2018-01-31 RX ORDER — CEFAZOLIN SODIUM 2 G/100ML
2 INJECTION, SOLUTION INTRAVENOUS
Status: COMPLETED | OUTPATIENT
Start: 2018-01-31 | End: 2018-01-31

## 2018-01-31 RX ORDER — LIDOCAINE 40 MG/G
CREAM TOPICAL
Status: DISCONTINUED | OUTPATIENT
Start: 2018-01-31 | End: 2018-02-03 | Stop reason: HOSPADM

## 2018-01-31 RX ORDER — ONDANSETRON 4 MG/1
4 TABLET, ORALLY DISINTEGRATING ORAL EVERY 30 MIN PRN
Status: DISCONTINUED | OUTPATIENT
Start: 2018-01-31 | End: 2018-01-31 | Stop reason: HOSPADM

## 2018-01-31 RX ORDER — HYDROMORPHONE HYDROCHLORIDE 1 MG/ML
.3-.5 INJECTION, SOLUTION INTRAMUSCULAR; INTRAVENOUS; SUBCUTANEOUS
Status: DISCONTINUED | OUTPATIENT
Start: 2018-01-31 | End: 2018-02-03 | Stop reason: HOSPADM

## 2018-01-31 RX ORDER — OXYCODONE HYDROCHLORIDE 5 MG/1
5 TABLET ORAL
Status: DISCONTINUED | OUTPATIENT
Start: 2018-01-31 | End: 2018-02-01

## 2018-01-31 RX ORDER — HYDROXYZINE HYDROCHLORIDE 10 MG/1
10 TABLET, FILM COATED ORAL EVERY 6 HOURS PRN
Status: DISCONTINUED | OUTPATIENT
Start: 2018-01-31 | End: 2018-02-03 | Stop reason: HOSPADM

## 2018-01-31 RX ORDER — LEVOTHYROXINE SODIUM 75 UG/1
75 TABLET ORAL
Status: DISCONTINUED | OUTPATIENT
Start: 2018-02-01 | End: 2018-02-03 | Stop reason: HOSPADM

## 2018-01-31 RX ORDER — GABAPENTIN 100 MG/1
100 CAPSULE ORAL DAILY
Status: COMPLETED | OUTPATIENT
Start: 2018-01-31 | End: 2018-02-02

## 2018-01-31 RX ORDER — CEFAZOLIN SODIUM 2 G/100ML
2 INJECTION, SOLUTION INTRAVENOUS EVERY 8 HOURS
Status: COMPLETED | OUTPATIENT
Start: 2018-01-31 | End: 2018-02-01

## 2018-01-31 RX ORDER — PROPOFOL 10 MG/ML
INJECTION, EMULSION INTRAVENOUS CONTINUOUS PRN
Status: DISCONTINUED | OUTPATIENT
Start: 2018-01-31 | End: 2018-01-31

## 2018-01-31 RX ORDER — ONDANSETRON 2 MG/ML
4 INJECTION INTRAMUSCULAR; INTRAVENOUS EVERY 6 HOURS PRN
Status: DISCONTINUED | OUTPATIENT
Start: 2018-01-31 | End: 2018-02-03 | Stop reason: HOSPADM

## 2018-01-31 RX ORDER — GABAPENTIN 100 MG/1
100 CAPSULE ORAL
Status: COMPLETED | OUTPATIENT
Start: 2018-01-31 | End: 2018-01-31

## 2018-01-31 RX ORDER — HYDROCODONE BITARTRATE AND ACETAMINOPHEN 5; 325 MG/1; MG/1
1-2 TABLET ORAL EVERY 4 HOURS PRN
Status: DISCONTINUED | OUTPATIENT
Start: 2018-01-31 | End: 2018-02-03 | Stop reason: HOSPADM

## 2018-01-31 RX ORDER — SODIUM CHLORIDE 9 MG/ML
INJECTION, SOLUTION INTRAVENOUS CONTINUOUS
Status: DISCONTINUED | OUTPATIENT
Start: 2018-01-31 | End: 2018-02-01

## 2018-01-31 RX ORDER — HYDROMORPHONE HYDROCHLORIDE 1 MG/ML
.3-.5 INJECTION, SOLUTION INTRAMUSCULAR; INTRAVENOUS; SUBCUTANEOUS EVERY 5 MIN PRN
Status: DISCONTINUED | OUTPATIENT
Start: 2018-01-31 | End: 2018-01-31 | Stop reason: HOSPADM

## 2018-01-31 RX ADMIN — SENNOSIDES AND DOCUSATE SODIUM 1 TABLET: 8.6; 5 TABLET ORAL at 19:57

## 2018-01-31 RX ADMIN — EPHEDRINE SULFATE 10 MG: 50 INJECTION, SOLUTION INTRAVENOUS at 15:05

## 2018-01-31 RX ADMIN — ACETAMINOPHEN 975 MG: 325 TABLET, FILM COATED ORAL at 19:57

## 2018-01-31 RX ADMIN — PHENYLEPHRINE HYDROCHLORIDE 200 MCG: 10 INJECTION, SOLUTION INTRAMUSCULAR; INTRAVENOUS; SUBCUTANEOUS at 15:47

## 2018-01-31 RX ADMIN — PHENYLEPHRINE HYDROCHLORIDE 100 MCG: 10 INJECTION, SOLUTION INTRAMUSCULAR; INTRAVENOUS; SUBCUTANEOUS at 15:15

## 2018-01-31 RX ADMIN — GABAPENTIN 100 MG: 100 CAPSULE ORAL at 14:31

## 2018-01-31 RX ADMIN — TRANEXAMIC ACID 1 G: 100 INJECTION, SOLUTION INTRAVENOUS at 14:24

## 2018-01-31 RX ADMIN — LIDOCAINE HYDROCHLORIDE 5 ML: 10 INJECTION, SOLUTION EPIDURAL; INFILTRATION; INTRACAUDAL; PERINEURAL at 14:32

## 2018-01-31 RX ADMIN — DEXAMETHASONE SODIUM PHOSPHATE 8 MG: 4 INJECTION, SOLUTION INTRA-ARTICULAR; INTRALESIONAL; INTRAMUSCULAR; INTRAVENOUS; SOFT TISSUE at 14:38

## 2018-01-31 RX ADMIN — PHENYLEPHRINE HYDROCHLORIDE 100 MCG: 10 INJECTION, SOLUTION INTRAMUSCULAR; INTRAVENOUS; SUBCUTANEOUS at 16:12

## 2018-01-31 RX ADMIN — EPHEDRINE SULFATE 10 MG: 50 INJECTION, SOLUTION INTRAVENOUS at 15:11

## 2018-01-31 RX ADMIN — LIDOCAINE HYDROCHLORIDE 1 ML: 10 INJECTION, SOLUTION EPIDURAL; INFILTRATION; INTRACAUDAL; PERINEURAL at 14:19

## 2018-01-31 RX ADMIN — EPHEDRINE SULFATE 10 MG: 50 INJECTION, SOLUTION INTRAVENOUS at 14:54

## 2018-01-31 RX ADMIN — CEFAZOLIN SODIUM 2 G: 2 INJECTION, SOLUTION INTRAVENOUS at 14:24

## 2018-01-31 RX ADMIN — SODIUM CHLORIDE: 9 INJECTION, SOLUTION INTRAVENOUS at 22:13

## 2018-01-31 RX ADMIN — ACETAMINOPHEN 1000 MG: 500 TABLET ORAL at 14:31

## 2018-01-31 RX ADMIN — SODIUM CHLORIDE, POTASSIUM CHLORIDE, SODIUM LACTATE AND CALCIUM CHLORIDE: 600; 310; 30; 20 INJECTION, SOLUTION INTRAVENOUS at 15:35

## 2018-01-31 RX ADMIN — PROPOFOL 50 MCG/KG/MIN: 10 INJECTION, EMULSION INTRAVENOUS at 14:38

## 2018-01-31 RX ADMIN — FENTANYL CITRATE 50 MCG: 50 INJECTION, SOLUTION INTRAMUSCULAR; INTRAVENOUS at 14:38

## 2018-01-31 RX ADMIN — MIDAZOLAM 1 MG: 1 INJECTION INTRAMUSCULAR; INTRAVENOUS at 14:24

## 2018-01-31 RX ADMIN — FENTANYL CITRATE 50 MCG: 50 INJECTION, SOLUTION INTRAMUSCULAR; INTRAVENOUS at 14:29

## 2018-01-31 RX ADMIN — PHENYLEPHRINE HYDROCHLORIDE 100 MCG: 10 INJECTION, SOLUTION INTRAMUSCULAR; INTRAVENOUS; SUBCUTANEOUS at 15:23

## 2018-01-31 RX ADMIN — PHENYLEPHRINE HYDROCHLORIDE 200 MCG: 10 INJECTION, SOLUTION INTRAMUSCULAR; INTRAVENOUS; SUBCUTANEOUS at 15:37

## 2018-01-31 RX ADMIN — CEFAZOLIN SODIUM 2 G: 2 INJECTION, SOLUTION INTRAVENOUS at 22:13

## 2018-01-31 RX ADMIN — GABAPENTIN 100 MG: 100 CAPSULE ORAL at 19:57

## 2018-01-31 RX ADMIN — ALLOPURINOL 100 MG: 100 TABLET ORAL at 19:57

## 2018-01-31 RX ADMIN — MIDAZOLAM 1 MG: 1 INJECTION INTRAMUSCULAR; INTRAVENOUS at 14:26

## 2018-01-31 RX ADMIN — PHENYLEPHRINE HYDROCHLORIDE 100 MCG: 10 INJECTION, SOLUTION INTRAMUSCULAR; INTRAVENOUS; SUBCUTANEOUS at 15:56

## 2018-01-31 RX ADMIN — ONDANSETRON 4 MG: 2 INJECTION INTRAMUSCULAR; INTRAVENOUS at 14:38

## 2018-01-31 RX ADMIN — SODIUM CHLORIDE, POTASSIUM CHLORIDE, SODIUM LACTATE AND CALCIUM CHLORIDE: 600; 310; 30; 20 INJECTION, SOLUTION INTRAVENOUS at 14:19

## 2018-01-31 ASSESSMENT — COPD QUESTIONNAIRES
COPD: 1
CAT_SEVERITY: MILD

## 2018-01-31 ASSESSMENT — LIFESTYLE VARIABLES: TOBACCO_USE: 1

## 2018-01-31 NOTE — IP AVS SNAPSHOT
` `     [facility] SURGICAL: [phone]                 INTERAGENCY TRANSFER FORM - NOTES (H&P, Discharge Summary, Consults, Procedures, Therapies)   2018                    Hospital Admission Date: 2018  TIFFANY DAVIS KAROLINE   : 1935  Sex: Female        Patient PCP Information     Provider PCP Type    ROSA Patel CNP General         History & Physicals      Interval H&P Note by Keysha Dixon APRN CRNA at 2018  2:07 PM     Author:  Keysha Dixon APRN CRNA Service:  Anesthesiology Author Type:  Nurse Anesthetist    Filed:  2018  2:07 PM Date of Service:  2018  2:07 PM Creation Time:  2018  2:07 PM    Status:  Signed :  Keysha Dixon APRN CRNA (Nurse Anesthetist)         This H&P has been reviewed and there are no clinically significant changes in the patient s condition.  The Patient is approved for surgery.[GB1.1]         Revision History        User Key Date/Time User Provider Type Action    > GB1.1 2018  2:07 PM Keysha Dixon APRN CRNA Nurse Anesthetist Sign          Source Note     Author:  Joy Kam APRN CNP Service:  (none) Author Type:  Nurse Practitioner    Filed:  2018  8:09 AM Date of Service:  2018 10:36 AM Creation Time:  2018  2:07 PM    Status:  Signed :  Joy Kam APRN CNP (Nurse Practitioner)              [facility]  [address] [place] [id number]  [phone]  Dept: [phone]    PRE-OP EVALUATION:  Today's date: 2018    Tiffany Salazarzelda (: 1935) presents for pre-operative evaluation assessment as requested by Dr. Parry.  She requires evaluation and anesthesia risk assessment prior to undergoing surgery/procedure for treatment of right hip pain .  Proposed procedure: Right total hip arthroplasty.     Date of Surgery/ Procedure: 2018  Time of Surgery/ Procedure:  3:00  Hospital/Surgical Facility: [facility]  Primary Physician: Joy Kam  Type of Anesthesia Anticipated: Choice    Patient has a Health Care Directive or Living Will:  YES     Preop Questions 1/26/2018   1.  Do you have a history of heart attack, stroke, stent, bypass or surgery on an artery in the head, neck, heart or legs?[RM1.1] YES-stent 2014[KM1.1]   2.  Do you ever have any pain or discomfort in your chest? No   3.  Do you have a history of  Heart Failure? No   4.   Are you troubled by shortness of breath when:  walking on a level surface, or up a slight hill, or at night? No   5.  Do you currently have a cold, bronchitis or other respiratory infection? No   6.  Do you have a cough, shortness of breath, or wheezing? No   7.  Do you sometimes get pains in the calves of your legs when you walk? No   8. Do you or anyone in your family have previous history of blood clots? No   9.  Do you or does anyone in your family have a serious bleeding problem such as prolonged bleeding following surgeries or cuts? No   10. Have you ever had problems with anemia or been told to take iron pills? No   11. Have you had any abnormal blood loss such as black, tarry or bloody stools, or abnormal vaginal bleeding? No   12. Have you ever had a blood transfusion? No   13. Have you or any of your relatives ever had problems with anesthesia? No   14. Do you have sleep apnea, excessive snoring or daytime drowsiness? No   15. Do you have any prosthetic heart valves? No   16. Do you have prosthetic joints? YES -[RM1.1] knees and shoulder[KM1.1]   17. Is there any chance that you may be pregnant? No         HPI:                                                      Brief HPI related to upcoming procedure:[RM1.1] ongoing right hip pain with osteoarthritis present in need of right total hip arthroplasty      See problem list for active medical problems.  Problems all longstanding and stable, except as noted/documented.  See ROS for  pertinent symptoms related to these conditions.                                                                                                  .[KM1.1]    MEDICAL HISTORY:[RM1.1]                                                      Patient Active Problem List    Diagnosis Date Noted     Status post total shoulder arthroplasty, left 05/04/2016     Priority: Medium     Osteopenia 03/03/2015     Priority: Medium     Feb 2015 dexa with mostly mild osteopenia.  Repeat in 3-5 yrs.       FH: rheumatoid arthritis 02/12/2015     Priority: Medium     Negative RF, DEVON, CCP 3/26/13  Never tested, but mother had RA and Elly has classic hand deformities.  She does not want to hear about it.       Coronary artery disease involving native coronary artery of native heart without angina pectoris 06/18/2014     Priority: Medium     4/15/2014  Cardiac catheterization-2 drug eluting stents placed in left main artery, moderated disease in LAD and circumflex, mild RCA, normal EF  On Brilinta for one year (dcd 4/28/15)           S/P PTCA (percutaneous transluminal coronary angioplasty) 06/18/2014     Priority: Medium     Health Care Home 04/17/2014     Priority: Medium       Status:  No Services Needed  Care Coordinator:  Yaa Nelson    See Letters for [facility] Care Plan  Date:  February 9, 2016               Generalized anxiety disorder 09/08/2013     Priority: Medium     Diagnosis updated by automated process. Provider to review and confirm.       S/P knee replacement left 01/02/2013     Priority: Medium     Primary localized osteoarthrosis, lower leg 12/26/2012     Priority: Medium     Anemia 12/06/2012     Priority: Medium     Status post shoulder joint replacement 10/05/2012     Priority: Medium     Phlebitis and thrombophlebitis of superficial vessels of lower extremities 10/02/2012     Priority: Medium     DJD of shoulder 09/26/2012     Priority: Medium     Acquired hypothyroidism 09/12/2012     Priority: Medium     Advanced  directives, counseling/discussion 04/12/2012     Priority: Medium     Advance Directive received and scanned. Click on Code in the patient header to view. 4/12/2012     DNI/DNR         CKD (chronic kidney disease) stage 3, GFR 30-59 ml/min 03/30/2011     Priority: Medium     Acute dermatitis due to solar radiation 09/09/2010     Priority: Medium     Idiopathic chronic gout of multiple sites without tophus 06/03/2007     Priority: Medium     Josselin 3, 2007: right great toe with elevated uric acid, started on -ALLOPURINOL 100 MG daily. recheck uric acid 2 week after starting medication and titate as needed.  Problem list name updated by automated process. Provider to review and confirm  Imo Update utility       surgical menopause ([facility]) for non-cancerous reasons 01/08/2007     Priority: Medium     Age 41       Hemangioma 05/17/2005     Priority: Medium     Area on chest subcutaneous between breast tissue  Problem list name updated by automated process. Provider to review       Benign essential hypertension 05/17/2005     Priority: Medium     Diverticulitis of colon 05/17/2005     Priority: Medium     Problem list name updated by automated process. Provider to review       Hyperlipidemia LDL goal <100 10/31/2010     Priority: Low     Chronic airway obstruction (H) 01/13/2008     Priority: Low     9/22/2010:She has been told there is lung damage from smoking over the years but has not been limited by any respiratory symptoms.  Not using any inhalers.   Problem list name updated by automated process. Provider to review       Morbid obesity due to excess calories (H) 01/08/2007     Priority: Low     Problem list name updated by automated process. Provider to review       Gastroesophageal reflux disease without esophagitis 05/17/2005     Priority: Low      Past Medical History:   Diagnosis Date     ACS (acute coronary syndrome) (H) 4/14/2014     Anemia 12/6/2012     Arthritis      Chronic kidney disease, stage III  (moderate) 10/2/2012     CKD (chronic kidney disease) stage 3, GFR 30-59 ml/min 3/30/2011     COPD (chronic obstructive pulmonary disease) (H)      Generalised anxiety disorder 9/8/2013     GERD 5/17/2005     HX OF COMPOUND HEMANGIOMA NOS [228.00]      HX OF SUPERFICIAL PHLEBITIS      Hyperlipidemia LDL goal <100 10/31/2010     Hypertension      Hypertension goal BP (blood pressure) < 140/90 5/17/2005     Hypothyroidism 9/12/2012     Obesity, unspecified 1/8/2007     Phlebitis and thrombophlebitis of superficial vessels of lower extremities 10/2/2012     Primary localized osteoarthrosis, lower leg 12/26/2012     S/P knee replacement left 1/2/2013     Thyroid disease      Unspecified hypothyroidism 10/2/2012     Past Surgical History:   Procedure Laterality Date     ARTHROPLASTY KNEE  4/13/2011    Procedure:ARTHROPLASTY KNEE; Surgeon:SEN PARRY; Location:[facility]     ARTHROPLASTY KNEE  12/26/2012    Procedure: ARTHROPLASTY KNEE;  Left Total Knee Arthroplasty;  Surgeon: Sen Parry MD;  Location: [facility]     ARTHROPLASTY SHOULDER  9/26/2012    Procedure: ARTHROPLASTY SHOULDER;  Right total shoulder arthroplasty;  Surgeon: Sen Parry MD;  Location: [facility]     ARTHROPLASTY SHOULDER Left 5/4/2016    Procedure: ARTHROPLASTY SHOULDER;  Surgeon: Sen Parry MD;  Location: [facility]     ARTHROSCOPY KNEE IRRIGATION AND DEBRIDEMENT  12/10/2010    ARTHROSCOPY KNEE IRRIGATION AND DEBRIDEMENT performed by LEY, JEFFREY DUANE at [facility]     CATARACT IOL, RT/LT  4/2010    bilateral     COLONOSCOPY  2002     COLONOSCOPY  6/19/2014    Procedure: COLONOSCOPY;  Surgeon: Steve Deng MD;  Location: [facility]     CORONARY ANGIOGRAPHY ADULT ORDER       HYSTERECTOMY, SARAH  1976    Total abdominal hysterectomy & bilateral salpingectomy oophorectomy     TONSILLECTOMY & ADENOIDECTOMY      as child     Current Outpatient Prescriptions   Medication Sig Dispense Refill     allopurinol (ZYLOPRIM) 100 MG tablet TAKE ONE TABLET BY  MOUTH EVERY DAY 90 tablet 3     Acetaminophen (TYLENOL PO) Take 325 mg by mouth every 4 hours as needed for mild pain or fever       GLUCOSAMINE-CHONDROITIN PO Take 1 tablet by mouth 2 times daily       levothyroxine (SYNTHROID/LEVOTHROID) 75 MCG tablet Take 1 tablet (75 mcg) by mouth daily 90 tablet 3     multivitamin, therapeutic with minerals (MULTI-VITAMIN) TABS Take 1 tablet by mouth daily 100 tablet 3     aspirin EC 81 MG EC tablet Take 1 tablet (81 mg) by mouth daily[KM1.2]       OTC products:[RM1.1] None, except as noted above[KM1.1]    Allergies   Allergen Reactions     Sulfa Drugs Other (See Comments)     Causes burning when urinates. Causes yeast infections.     Percocet [Oxycodone-Acetaminophen] Other (See Comments)     Freaked out and broke a toilet at [facility][KM1.2]      Latex Allergy:[RM1.1] NO[KM1.1]    Social History   Substance Use Topics     Smoking status: Former Smoker     Quit date: 1/1/1970     Smokeless tobacco: Never Used      Comment: 20 year hx of smoking 3 packs of cigarettes daily; quit 1970     Alcohol use No     History   Drug Use No[KM1.2]       REVIEW OF SYSTEMS:[RM1.1]                                                    C: NEGATIVE for fever, chills, change in weight  I: NEGATIVE for worrisome rashes, moles or lesions  E: NEGATIVE for vision changes or irritation  E/M: NEGATIVE for ear, mouth and throat problems  R: NEGATIVE for significant cough or SOB  B: NEGATIVE for masses, tenderness or discharge  CV: NEGATIVE for chest pain, palpitations or peripheral edema  GI: NEGATIVE for nausea, abdominal pain, heartburn, or change in bowel habits  : NEGATIVE for frequency, dysuria, or hematuria  MUSCULOSKELETAL:POSITIVE  for joint pain right hip  N: NEGATIVE for weakness, dizziness or paresthesias  E: NEGATIVE for temperature intolerance, skin/hair changes  H: NEGATIVE for bleeding problems  P: NEGATIVE for changes in mood or affect    EXAM:[KM1.1]                                      "               /72 (Cuff Size: Adult Large)  Pulse 72  Temp 97.6  F (36.4  C) (Tympanic)  Ht 5' 3\" (1.6 m)  Wt 231 lb (104.8 kg)  LMP 01/01/1977  SpO2 97%  BMI 40.92 kg/m2[KM1.2]    GENERAL APPEARANCE: healthy, alert and no distress     EYES: EOMI, PERRL     HENT: ear canals and TM's normal and nose and mouth without ulcers or lesions     NECK: no adenopathy, no asymmetry, masses, or scars and thyroid normal to palpation     RESP: lungs clear to auscultation - no rales, rhonchi or wheezes     CV: regular rates and rhythm, normal S1 S2, no S3 or S4 and no murmur, click or rub     ABDOMEN:  soft, nontender, no HSM or masses and bowel sounds normal     MS: extremities normal- no gross deformities noted     SKIN: no suspicious lesions or rashes     NEURO: Normal strength and tone, sensory exam grossly normal, mentation intact and speech normal     PSYCH: mentation appears normal. and affect normal/bright    DIAGNOST[KM1.1]ICS:[RM1.1]                                                    EKG:[KM1.3] appears normal, NSR, normal axis, normal intervals, no acute ST/T changes c/w ischemia, no LVH by voltage criteria    Results for orders placed or performed in visit on 01/26/18   CBC with platelets   Result Value Ref Range    WBC 6.4 4.0 - 11.0 10e9/L    RBC Count 4.19 3.8 - 5.2 10e12/L    Hemoglobin 13.5 11.7 - 15.7 g/dL    Hematocrit 40.1 35.0 - 47.0 %    MCV 96 78 - 100 fl    MCH 32.2 26.5 - 33.0 pg    MCHC 33.7 31.5 - 36.5 g/dL    RDW 12.9 10.0 - 15.0 %    Platelet Count 187 150 - 450 10e9/L   Basic metabolic panel   Result Value Ref Range    Sodium 132 (L) 133 - 144 mmol/L    Potassium 4.4 3.4 - 5.3 mmol/L    Chloride 98 94 - 109 mmol/L    Carbon Dioxide 29 20 - 32 mmol/L    Anion Gap 5 3 - 14 mmol/L    Glucose 96 70 - 99 mg/dL    Urea Nitrogen 18 7 - 30 mg/dL    Creatinine 1.24 (H) 0.52 - 1.04 mg/dL    GFR Estimate 42 (L) >60 mL/min/1.7m2    GFR Estimate If Black 50 (L) >60 mL/min/1.7m2    Calcium 9.0 8.5 - " 10.1 mg/dL[KM1.4]     IMPRESSION:[RM1.1]                                                    Reason for surgery/procedure: ongoing right hip pain with osteoarthritis present in need of right total hip arthroplasty[KM1.1]    The proposed surgical procedure is considered[RM1.1] INTERMEDIATE[KM1.1] risk.    REVISED CARDIAC RISK INDEX  The patient has the following serious cardiovascular risks for perioperative complications such as (MI, PE, VFib and 3  AV Block):[RM1.1]  Coronary Artery Disease (MI, positive stress test, angina, Qs on EKG)[KM1.1]  INTERPRETATION:[RM1.1] 1 risks: Class II (low risk - 0.9% complication rate)[KM1.1]    The patient has the following additional risks for perioperative complications:[RM1.1]  No identified additional risks[KM1.1]      ICD-10-CM    1. Preop general physical exam Z01.818 EKG 12-lead complete w/read - Clinics   2. Primary osteoarthritis of right hip M16.11    3. Coronary artery disease involving native coronary artery of native heart without angina pectoris I25.10 EKG 12-lead complete w/read - Clinics   4. Morbid obesity due to excess calories (H) E66.01    5. Chronic obstructive pulmonary disease, unspecified COPD type (H) J44.9    6. Benign essential hypertension I10    7. CKD (chronic kidney disease) stage 3, GFR 30-59 ml/min N18.3[KM1.2]        RECOMMENDATIONS:[RM1.1]                                                      --Consult hospital rounder / IM to assist post-op medical management    Cardiovascular Risk  Beta blockers not indicated. No chest pain or shortness of breath with activity, EKG normal.      Pulmonary Risk  Incentive spirometry post op  Respiratory Therapy (Respiratory Care IP Consult)  post op      --Patient is to take all scheduled medications on the day of surgery EXCEPT for modifications listed below.    Anticoagulant or Antiplatelet Medication Use  ASPIRIN: Discontinue ASA 7-10 days prior to procedure to reduce bleeding risk.  It should be resumed  post-operatively.[KM1.1]        APPROVAL GIVEN to proceed with proposed procedure, without further diagnostic evaluation[KM1.4]       Signed Electronically by: ROSA Patel CNP    Copy of this evaluation report is provided to requesting physician.    [facility] Preop Guidelines[RM1.1]      Revision History        User Key Date/Time User Provider Type Action    > KM1.4 2018  2:07 PM Joy Kam APRN CNP Nurse Practitioner Sign     KM1.2 2018 11:04 AM Joy Kam APRN CNP Nurse Practitioner      KM1.1 2018 10:57 AM Joy Kam APRN CNP Nurse Practitioner      KM1.3 2018 10:52 AM Joy Kam APRN CNP Nurse Practitioner      RM1.1 2018 10:36 AM Malina Metcalf CMA Medical Assistant                   H&P (View-Only) by Joy Kam APRN CNP at 2018 10:36 AM     Author:  Joy Kam APRN CNP Service:  (none) Author Type:  Nurse Practitioner    Filed:  2018  8:09 AM Date of Service:  2018 10:36 AM Creation Time:  2018  2:07 PM    Status:  Signed :  Joy Kam APRN CNP (Nurse Practitioner)           [address] [address]  [phone]  Dept: [phone]    PRE-OP EVALUATION:  Today's date: 2018    Elly Choe (: 1935) presents for pre-operative evaluation assessment as requested by Dr. Parry.  She requires evaluation and anesthesia risk assessment prior to undergoing surgery/procedure for treatment of right hip pain .  Proposed procedure: Right total hip arthroplasty.     Date of Surgery/ Procedure: 2018  Time of Surgery/ Procedure: 3:00  Hospital/Surgical Facility: [facility]  Primary Physician: Joy Kam  Type of Anesthesia Anticipated: Choice    Patient has a Health Care Directive or Living Will:  YES     Preop Questions 2018   1.  Do you have a history of heart attack, stroke, stent,  bypass or surgery on an artery in the head, neck, heart or legs?[RM1.1] YES-stent 2014[KM1.1]   2.  Do you ever have any pain or discomfort in your chest? No   3.  Do you have a history of  Heart Failure? No   4.   Are you troubled by shortness of breath when:  walking on a level surface, or up a slight hill, or at night? No   5.  Do you currently have a cold, bronchitis or other respiratory infection? No   6.  Do you have a cough, shortness of breath, or wheezing? No   7.  Do you sometimes get pains in the calves of your legs when you walk? No   8. Do you or anyone in your family have previous history of blood clots? No   9.  Do you or does anyone in your family have a serious bleeding problem such as prolonged bleeding following surgeries or cuts? No   10. Have you ever had problems with anemia or been told to take iron pills? No   11. Have you had any abnormal blood loss such as black, tarry or bloody stools, or abnormal vaginal bleeding? No   12. Have you ever had a blood transfusion? No   13. Have you or any of your relatives ever had problems with anesthesia? No   14. Do you have sleep apnea, excessive snoring or daytime drowsiness? No   15. Do you have any prosthetic heart valves? No   16. Do you have prosthetic joints? YES -[RM1.1] knees and shoulder[KM1.1]   17. Is there any chance that you may be pregnant? No         HPI:                                                      Brief HPI related to upcoming procedure:[RM1.1] ongoing right hip pain with osteoarthritis present in need of right total hip arthroplasty      See problem list for active medical problems.  Problems all longstanding and stable, except as noted/documented.  See ROS for pertinent symptoms related to these conditions.                                                                                                  .[KM1.1]    MEDICAL HISTORY:[RM1.1]                                                      Patient Active Problem List     Diagnosis Date Noted     Status post total shoulder arthroplasty, left 05/04/2016     Priority: Medium     Osteopenia 03/03/2015     Priority: Medium     Feb 2015 dexa with mostly mild osteopenia.  Repeat in 3-5 yrs.       FH: rheumatoid arthritis 02/12/2015     Priority: Medium     Negative RF, DEVON, CCP 3/26/13  Never tested, but mother had RA and Elly has classic hand deformities.  She does not want to hear about it.       Coronary artery disease involving native coronary artery of native heart without angina pectoris 06/18/2014     Priority: Medium     4/15/2014  Cardiac catheterization-2 drug eluting stents placed in left main artery, moderated disease in LAD and circumflex, mild RCA, normal EF  On Brilinta for one year (dcd 4/28/15)           S/P PTCA (percutaneous transluminal coronary angioplasty) 06/18/2014     Priority: Medium     Health Care Home 04/17/2014     Priority: Medium       Status:  No Services Needed  Care Coordinator:  Yaa Nelson    See Letters for [facility] Care Plan  Date:  February 9, 2016               Generalized anxiety disorder 09/08/2013     Priority: Medium     Diagnosis updated by automated process. Provider to review and confirm.       S/P knee replacement left 01/02/2013     Priority: Medium     Primary localized osteoarthrosis, lower leg 12/26/2012     Priority: Medium     Anemia 12/06/2012     Priority: Medium     Status post shoulder joint replacement 10/05/2012     Priority: Medium     Phlebitis and thrombophlebitis of superficial vessels of lower extremities 10/02/2012     Priority: Medium     DJD of shoulder 09/26/2012     Priority: Medium     Acquired hypothyroidism 09/12/2012     Priority: Medium     Advanced directives, counseling/discussion 04/12/2012     Priority: Medium     Advance Directive received and scanned. Click on Code in the patient header to view. 4/12/2012     DNI/DNR         CKD (chronic kidney disease) stage 3, GFR 30-59 ml/min 03/30/2011     Priority:  Medium     Acute dermatitis due to solar radiation 09/09/2010     Priority: Medium     Idiopathic chronic gout of multiple sites without tophus 06/03/2007     Priority: Medium     Josselin 3, 2007: right great toe with elevated uric acid, started on -ALLOPURINOL 100 MG daily. recheck uric acid 2 week after starting medication and titate as needed.  Problem list name updated by automated process. Provider to review and confirm  [employer] Update utility       surgical menopause ([facility]) for non-cancerous reasons 01/08/2007     Priority: Medium     Age 41       Hemangioma 05/17/2005     Priority: Medium     Area on chest subcutaneous between breast tissue  Problem list name updated by automated process. Provider to review       Benign essential hypertension 05/17/2005     Priority: Medium     Diverticulitis of colon 05/17/2005     Priority: Medium     Problem list name updated by automated process. Provider to review       Hyperlipidemia LDL goal <100 10/31/2010     Priority: Low     Chronic airway obstruction (H) 01/13/2008     Priority: Low     9/22/2010:She has been told there is lung damage from smoking over the years but has not been limited by any respiratory symptoms.  Not using any inhalers.   Problem list name updated by automated process. Provider to review       Morbid obesity due to excess calories (H) 01/08/2007     Priority: Low     Problem list name updated by automated process. Provider to review       Gastroesophageal reflux disease without esophagitis 05/17/2005     Priority: Low      Past Medical History:   Diagnosis Date     ACS (acute coronary syndrome) (H) 4/14/2014     Anemia 12/6/2012     Arthritis      Chronic kidney disease, stage III (moderate) 10/2/2012     CKD (chronic kidney disease) stage 3, GFR 30-59 ml/min 3/30/2011     COPD (chronic obstructive pulmonary disease) (H)      Generalised anxiety disorder 9/8/2013     GERD 5/17/2005     HX OF COMPOUND HEMANGIOMA NOS [228.00]      HX OF SUPERFICIAL  PHLEBITIS      Hyperlipidemia LDL goal <100 10/31/2010     Hypertension      Hypertension goal BP (blood pressure) < 140/90 5/17/2005     Hypothyroidism 9/12/2012     Obesity, unspecified 1/8/2007     Phlebitis and thrombophlebitis of superficial vessels of lower extremities 10/2/2012     Primary localized osteoarthrosis, lower leg 12/26/2012     S/P knee replacement left 1/2/2013     Thyroid disease      Unspecified hypothyroidism 10/2/2012     Past Surgical History:   Procedure Laterality Date     ARTHROPLASTY KNEE  4/13/2011    Procedure:ARTHROPLASTY KNEE; Surgeon:SEN PARRY; Location:[facility]     ARTHROPLASTY KNEE  12/26/2012    Procedure: ARTHROPLASTY KNEE;  Left Total Knee Arthroplasty;  Surgeon: Sen Parry MD;  Location: [facility]     ARTHROPLASTY SHOULDER  9/26/2012    Procedure: ARTHROPLASTY SHOULDER;  Right total shoulder arthroplasty;  Surgeon: Sen Parry MD;  Location: [facility]     ARTHROPLASTY SHOULDER Left 5/4/2016    Procedure: ARTHROPLASTY SHOULDER;  Surgeon: Sen Parry MD;  Location: [facility]     ARTHROSCOPY KNEE IRRIGATION AND DEBRIDEMENT  12/10/2010    ARTHROSCOPY KNEE IRRIGATION AND DEBRIDEMENT performed by LEY, JEFFREY DUANE at [facility]     CATARACT IOL, RT/LT  4/2010    bilateral     COLONOSCOPY  2002     COLONOSCOPY  6/19/2014    Procedure: COLONOSCOPY;  Surgeon: Steve Deng MD;  Location: [facility]     CORONARY ANGIOGRAPHY ADULT ORDER       HYSTERECTOMY, SARAH  1976    Total abdominal hysterectomy & bilateral salpingectomy oophorectomy     TONSILLECTOMY & ADENOIDECTOMY      as child     Current Outpatient Prescriptions   Medication Sig Dispense Refill     allopurinol (ZYLOPRIM) 100 MG tablet TAKE ONE TABLET BY MOUTH EVERY DAY 90 tablet 3     Acetaminophen (TYLENOL PO) Take 325 mg by mouth every 4 hours as needed for mild pain or fever       GLUCOSAMINE-CHONDROITIN PO Take 1 tablet by mouth 2 times daily       levothyroxine (SYNTHROID/LEVOTHROID) 75 MCG tablet Take 1 tablet (75  "mcg) by mouth daily 90 tablet 3     multivitamin, therapeutic with minerals (MULTI-VITAMIN) TABS Take 1 tablet by mouth daily 100 tablet 3     aspirin EC 81 MG EC tablet Take 1 tablet (81 mg) by mouth daily[KM1.2]       OTC products:[RM1.1] None, except as noted above[KM1.1]    Allergies   Allergen Reactions     Sulfa Drugs Other (See Comments)     Causes burning when urinates. Causes yeast infections.     Percocet [Oxycodone-Acetaminophen] Other (See Comments)     Freaked out and broke a toilet at [facility][KM1.2]      Latex Allergy:[RM1.1] NO[KM1.1]    Social History   Substance Use Topics     Smoking status: Former Smoker     Quit date: 1/1/1970     Smokeless tobacco: Never Used      Comment: 20 year hx of smoking 3 packs of cigarettes daily; quit 1970     Alcohol use No     History   Drug Use No[KM1.2]       REVIEW OF SYSTEMS:[RM1.1]                                                    C: NEGATIVE for fever, chills, change in weight  I: NEGATIVE for worrisome rashes, moles or lesions  E: NEGATIVE for vision changes or irritation  E/M: NEGATIVE for ear, mouth and throat problems  R: NEGATIVE for significant cough or SOB  B: NEGATIVE for masses, tenderness or discharge  CV: NEGATIVE for chest pain, palpitations or peripheral edema  GI: NEGATIVE for nausea, abdominal pain, heartburn, or change in bowel habits  : NEGATIVE for frequency, dysuria, or hematuria  MUSCULOSKELETAL:POSITIVE  for joint pain right hip  N: NEGATIVE for weakness, dizziness or paresthesias  E: NEGATIVE for temperature intolerance, skin/hair changes  H: NEGATIVE for bleeding problems  P: NEGATIVE for changes in mood or affect    EXAM:[KM1.1]                                                    /72 (Cuff Size: Adult Large)  Pulse 72  Temp 97.6  F (36.4  C) (Tympanic)  Ht 5' 3\" (1.6 m)  Wt 231 lb (104.8 kg)  LMP 01/01/1977  SpO2 97%  BMI 40.92 kg/m2[KM1.2]    GENERAL APPEARANCE: healthy, alert and no distress     EYES: EOMI, PERRL     " HENT: ear canals and TM's normal and nose and mouth without ulcers or lesions     NECK: no adenopathy, no asymmetry, masses, or scars and thyroid normal to palpation     RESP: lungs clear to auscultation - no rales, rhonchi or wheezes     CV: regular rates and rhythm, normal S1 S2, no S3 or S4 and no murmur, click or rub     ABDOMEN:  soft, nontender, no HSM or masses and bowel sounds normal     MS: extremities normal- no gross deformities noted     SKIN: no suspicious lesions or rashes     NEURO: Normal strength and tone, sensory exam grossly normal, mentation intact and speech normal     PSYCH: mentation appears normal. and affect normal/bright    DIAGNOST[KM1.1]ICS:[RM1.1]                                                    EKG:[KM1.3] appears normal, NSR, normal axis, normal intervals, no acute ST/T changes c/w ischemia, no LVH by voltage criteria    Results for orders placed or performed in visit on 01/26/18   CBC with platelets   Result Value Ref Range    WBC 6.4 4.0 - 11.0 10e9/L    RBC Count 4.19 3.8 - 5.2 10e12/L    Hemoglobin 13.5 11.7 - 15.7 g/dL    Hematocrit 40.1 35.0 - 47.0 %    MCV 96 78 - 100 fl    MCH 32.2 26.5 - 33.0 pg    MCHC 33.7 31.5 - 36.5 g/dL    RDW 12.9 10.0 - 15.0 %    Platelet Count 187 150 - 450 10e9/L   Basic metabolic panel   Result Value Ref Range    Sodium 132 (L) 133 - 144 mmol/L    Potassium 4.4 3.4 - 5.3 mmol/L    Chloride 98 94 - 109 mmol/L    Carbon Dioxide 29 20 - 32 mmol/L    Anion Gap 5 3 - 14 mmol/L    Glucose 96 70 - 99 mg/dL    Urea Nitrogen 18 7 - 30 mg/dL    Creatinine 1.24 (H) 0.52 - 1.04 mg/dL    GFR Estimate 42 (L) >60 mL/min/1.7m2    GFR Estimate If Black 50 (L) >60 mL/min/1.7m2    Calcium 9.0 8.5 - 10.1 mg/dL[KM1.4]     IMPRESSION:[RM1.1]                                                    Reason for surgery/procedure: ongoing right hip pain with osteoarthritis present in need of right total hip arthroplasty[KM1.1]    The proposed surgical procedure is  considered[RM1.1] INTERMEDIATE[KM1.1] risk.    REVISED CARDIAC RISK INDEX  The patient has the following serious cardiovascular risks for perioperative complications such as (MI, PE, VFib and 3  AV Block):[RM1.1]  Coronary Artery Disease (MI, positive stress test, angina, Qs on EKG)[KM1.1]  INTERPRETATION:[RM1.1] 1 risks: Class II (low risk - 0.9% complication rate)[KM1.1]    The patient has the following additional risks for perioperative complications:[RM1.1]  No identified additional risks[KM1.1]      ICD-10-CM    1. Preop general physical exam Z01.818 EKG 12-lead complete w/read - Clinics   2. Primary osteoarthritis of right hip M16.11    3. Coronary artery disease involving native coronary artery of native heart without angina pectoris I25.10 EKG 12-lead complete w/read - Clinics   4. Morbid obesity due to excess calories (H) E66.01    5. Chronic obstructive pulmonary disease, unspecified COPD type (H) J44.9    6. Benign essential hypertension I10    7. CKD (chronic kidney disease) stage 3, GFR 30-59 ml/min N18.3[KM1.2]        RECOMMENDATIONS:[RM1.1]                                                      --Consult hospital rounder / IM to assist post-op medical management    Cardiovascular Risk  Beta blockers not indicated. No chest pain or shortness of breath with activity, EKG normal.      Pulmonary Risk  Incentive spirometry post op  Respiratory Therapy (Respiratory Care IP Consult)  post op      --Patient is to take all scheduled medications on the day of surgery EXCEPT for modifications listed below.    Anticoagulant or Antiplatelet Medication Use  ASPIRIN: Discontinue ASA 7-10 days prior to procedure to reduce bleeding risk.  It should be resumed post-operatively.[KM1.1]        APPROVAL GIVEN to proceed with proposed procedure, without further diagnostic evaluation[KM1.4]       Signed Electronically by: ROSA Patel CNP    Copy of this evaluation report is provided to requesting  physician.    [place] Preop Guidelines[RM1.1]     Revision History        User Key Date/Time User Provider Type Action    > KM1.4 1/31/2018  2:07 PM Joy Kam APRN CNP Nurse Practitioner Sign     KM1.2 1/26/2018 11:04 AM Joy Kam APRN CNP Nurse Practitioner      KM1.1 1/26/2018 10:57 AM Joy Kam APRN CNP Nurse Practitioner      KM1.3 1/26/2018 10:52 AM Joy Kam APRN CNP Nurse Practitioner      RM1.1 1/26/2018 10:36 AM Malina Metcalf CMA Medical Assistant                      Discharge Summaries      Discharge Summaries by Ralph Ross MD at 2/3/2018  9:05 AM     Author:  Ralph Ross MD Service:  Orthopedics Author Type:  Physician    Filed:  2/3/2018  9:05 AM Date of Service:  2/3/2018  9:05 AM Creation Time:  2/3/2018  9:05 AM    Status:  Signed :  Ralph Ross MD (Physician)           [facility] Orthopedics Discharge Summary                                  [facility]     TIFFANY RAMEY [id number]   Age: 82 year old  PCP: Joy Kam, [phone]     Date of Admission:  1/31/2018  Date of Discharge::  2/3/2018  Discharge Physician:  Ralph Ross    Code status:  Full Code    Admission Information:  Admission Diagnosis:  right hip degenerative joint disease  Degenerative joint disease (DJD) of hip    Post-Operative Day: 3 Days Post-Op     Reason for admission:  The patient was admitted for the following:Procedure(s) (LRB):  ARTHROPLASTY HIP (Right)    Principal Problem:    Status post total replacement of right hip  Active Problems:    Idiopathic chronic gout of multiple sites without tophus    CKD (chronic kidney disease) stage 3, GFR 30-59 ml/min    Acquired hypothyroidism    Coronary artery disease involving native coronary artery of native heart without angina pectoris    Degenerative joint disease (DJD) of hip    Postoperative anemia      Allergies:  Sulfa  drugs    Following the procedure noted above the patient was transferred to the post-op floor and started on:    Therapy:  physical therapy and occupational therapy  Anticoagulation Plan:  mg daily  for 42 days  Pain Management: norco  Weight bearing status: Weight bearing as tolerated     The patient was followed and co-managed by the hospitalist service during the inpatient treatment course  Complications:  None  Consultations:  None     Pertinent Labs   Lab Results: personally reviewed.     Recent Labs   Lab Test  02/02/18   0749  02/01/18   0645  01/31/18   1349  01/26/18   1116  01/02/18   1133  12/19/17   1213   05/02/16   1451   03/01/16   1308   12/29/12   0616  12/28/12   0610   04/14/11   0615   INR   --    --   0.99   --    --    --    --    --    --    --    --   2.28*  2.04*   < >  1.12   HGB  10.7*  11.2*  14.4  13.5   --   14.0   < >  12.7   < >  13.2   < >   --   10.2*   < >  11.9   HCT   --    --   41.4  40.1   --   40.1   < >  38.2   < >  39.3   < >   --    --    < >   --    MCV   --    --   94  96   --   94   < >  97   < >  97   < >   --    --    < >   --    PLT   --    --   159  187   --   188   < >  169   < >  171   < >   --    --    < >   --    NA   --    --    --   132*  135  127*   < >  139   < >  138   < >   --    --    < >  134   CRP   --    --    --    --    --    --    --   3.7   --   <2.9   --    --    --    --   27.2*    < > = values in this interval not displayed.          Discharge Information:  Condition at discharge: Stable  Discharge destination:  Discharged to short-term care facility     Medications at discharge:  Current Discharge Medication List      START taking these medications    Details   senna-docusate (SENOKOT-S;PERICOLACE) 8.6-50 MG per tablet Take 1-2 tablets by mouth daily as needed for constipation  Qty: 30 tablet, Refills: 0    Associated Diagnoses: Primary localized osteoarthrosis of right lower leg      aspirin 325 MG tablet Take 1 tablet (325 mg) by mouth  daily  Qty: 40 tablet, Refills: 0    Associated Diagnoses: Primary localized osteoarthrosis of right lower leg      HYDROcodone-acetaminophen (NORCO) 5-325 MG per tablet Take 1-2 tablets by mouth every 4 hours as needed for moderate to severe pain  Qty: 60 tablet, Refills: 0    Associated Diagnoses: Primary localized osteoarthrosis of right lower leg         CONTINUE these medications which have NOT CHANGED    Details   allopurinol (ZYLOPRIM) 100 MG tablet TAKE ONE TABLET BY MOUTH EVERY DAY  Qty: 90 tablet, Refills: 3    Associated Diagnoses: Gouty arthropathy      Acetaminophen (TYLENOL PO) Take 325 mg by mouth every 4 hours as needed for mild pain or fever      GLUCOSAMINE-CHONDROITIN PO Take 1 tablet by mouth 2 times daily      levothyroxine (SYNTHROID/LEVOTHROID) 75 MCG tablet Take 1 tablet (75 mcg) by mouth daily  Qty: 90 tablet, Refills: 3    Associated Diagnoses: Acquired hypothyroidism      multivitamin, therapeutic with minerals (MULTI-VITAMIN) TABS Take 1 tablet by mouth daily  Qty: 100 tablet, Refills: 3    Associated Diagnoses: Anemia, unspecified anemia type         STOP taking these medications       aspirin EC 81 MG EC tablet Comments:   Reason for Stopping:                          Follow-Up Care:  Patient should be seen in the office in 14 days by the Orthopedic Surgeon/Physician Assistant.  Call [phone] for appointment or questions.    Ralph Ross[EP1.1]     Revision History        User Key Date/Time User Provider Type Action    > EP1.1 2/3/2018  9:05 AM Ralph Ross MD Physician Sign                     Consult Notes      Consults by Katja Emanuel RN at 2/1/2018 11:55 AM     Author:  Katja Emanuel RN Service:  (none) Author Type:      Filed:  2/1/2018 11:55 AM Date of Service:  2/1/2018 11:55 AM Creation Time:  2/1/2018 11:53 AM    Status:  Signed :  Katja Emanuel RN ()     Consult Orders:    1. Social Work IP Consult [[id number]]  ordered by Wanrer Garcia PA-C at 02/01/18 0757                Care Transition Initial Assessment - RN  Reason For Consult: discharge planning   Met with: Patient.    DATA   Principal Problem:    Status post total replacement of right hip  Active Problems:    Idiopathic chronic gout of multiple sites without tophus    CKD (chronic kidney disease) stage 3, GFR 30-59 ml/min    Acquired hypothyroidism    Coronary artery disease involving native coronary artery of native heart without angina pectoris    Degenerative joint disease (DJD) of hip       Primary Care Clinic Name: [facility]  Primary Care MD Name: Joy Kam  Contact information and PCP information verified: Yes    ASSESSMENT  Cognitive Status: awake, alert and oriented.       Resources List: Transitional Care     Lives With: alone  Living Arrangements: apartment     Description of Support System: Supportive   Who is your support system?: Neighbor   Support Assessment: Lacks necessary supervision and assistance   Insurance Concerns: No Insurance issues identified      This writer met with pt, introduced self and role. Discussed discharge planning and Medicare guidelines in regards to home care and SNF benefits. Pt lives alone in a senior apartment complex. All of her family lives out of states. Patient plans to go to TCU after hospitalization. Patient was provided with Medicare certified nursing home list. Pts choices are as follows [facility] (Phone: [phone] Fax: [phone]); pt has been accepted at [facility].     PLAN    [facility] (Phone: [phone] Fax: [phone])      Discharge Planner   Discharge Plans in progress: TCU  Barriers to discharge plan: Mobility  Follow up plan: Handoff to [facility]       Entered by: АЛЕКСАНДР HERNANDEZ 02/01/2018 11:53 AM         Katja Emanuel, MSN, RN, Care Coordinator  [facility] [phone]  [facility] [phone][JM1.1]     Revision History        User Key Date/Time User  Provider Type Action    > JM1.1 2/1/2018 11:55 AM Katja Emanuel, SONG Case Manager Sign                     Progress Notes - Physician (Notes from 01/31/18 through 02/03/18)      Progress Notes by Ralph Ross MD at 2/3/2018  8:58 AM     Author:  Ralph Ross MD Service:  Orthopedics Author Type:  Physician    Filed:  2/3/2018  8:59 AM Date of Service:  2/3/2018  8:58 AM Creation Time:  2/3/2018  8:58 AM    Status:  Signed :  Ralph Ross MD (Physician)         [facility] Orthopaedics Progress Note      Post-operative Day: 3 Days Post-Op    Procedure(s):  Right total hip arthroplasty.  - Wound Class: I-Clean      Subjective:    Pain: minimal  Chest pain, SOB:  No      Objective:  Blood pressure 106/42, pulse 70, temperature 97.5  F (36.4  C), temperature source Oral, resp. rate 18, last menstrual period 01/14/2013, SpO2 97 %, not currently breastfeeding.    Patient Vitals for the past 24 hrs:   BP Temp Temp src Pulse Resp SpO2   02/03/18 0722 106/42 97.5  F (36.4  C) Oral 70 18 97 %   02/02/18 2348 140/54 98.8  F (37.1  C) Oral 79 16 95 %   02/02/18 1539 152/40 97.6  F (36.4  C) Oral 80 18 97 %       Wt Readings from Last 4 Encounters:   01/26/18 231 lb (104.8 kg)   01/09/18 234 lb (106.1 kg)   12/19/17 232 lb (105.2 kg)   11/03/17 231 lb (104.8 kg)         Motor function, sensation, and circulation intact   Yes  Wound status: incisions are clean dry and intact. Yes  Calf tenderness: Bilateral  No    Pertinent Labs   Lab Results: personally reviewed.     Recent Labs   Lab Test  02/02/18   0749  02/01/18   0645  01/31/18   1349  01/26/18   1116  01/02/18   1133  12/19/17   1213   05/02/16   1451   03/01/16   1308   12/29/12   0616  12/28/12   0610   04/14/11   0615   INR   --    --   0.99   --    --    --    --    --    --    --    --   2.28*  2.04*   < >  1.12   HGB  10.7*  11.2*  14.4  13.5   --   14.0   < >  12.7   < >  13.2   < >   --   10.2*   < >  11.9   HCT   --    --    41.4  40.1   --   40.1   < >  38.2   < >  39.3   < >   --    --    < >   --    MCV   --    --   94  96   --   94   < >  97   < >  97   < >   --    --    < >   --    PLT   --    --   159  187   --   188   < >  169   < >  171   < >   --    --    < >   --    NA   --    --    --   132*  135  127*   < >  139   < >  138   < >   --    --    < >  134   CRP   --    --    --    --    --    --    --   3.7   --   <2.9   --    --    --    --   27.2*    < > = values in this interval not displayed.       Plan: Anticoagulation protocol:  mg daily  x 42  days            Pain medications:  norco            Weight bearing status:  WBAT            Disposition:  [facility] today.  Follow up with Dr. Parry in 2 weeks as scheduled             Continue cares and rehabilitation     Report completed by:  Ralph Ross MD  Date: 2/3/2018  Time: 8:58 AM[EP1.1]     Revision History        User Key Date/Time User Provider Type Action    > EP1.1 2/3/2018  8:59 AM Ralph Ross MD Physician Sign            Progress Notes by Stacy Gonsales RN at 2/2/2018 10:36 PM     Author:  Stacy Gonsales RN Service:  (none) Author Type:  Registered Nurse    Filed:  2/2/2018 10:39 PM Date of Service:  2/2/2018 10:36 PM Creation Time:  2/2/2018 10:36 PM    Status:  Signed :  Stacy Gonsales RN (Registered Nurse)         This RN took over cares for patient @ 1900, patient has been resting comfortably, received one tab of Norco on request, pain is adequately controlled, no other issues have arisen.  Planning d/c tomorrow to [facility].[NM1.1]     Revision History        User Key Date/Time User Provider Type Action    > NM1.1 2/2/2018 10:39 PM Stacy Gonsales RN Registered Nurse Sign            Progress Notes by Ambreen Bah RN at 2/2/2018  1:52 PM     Author:  Ambreen Bah RN Service:  (none) Author Type:  Care Coordinator    Filed:  2/2/2018  1:52 PM Encounter Date:  2/2/2018 Status:  Signed     :  Ambreen Bah RN (Care Coordinator)           Care Coordination Communication    Referral Source:  CTS    Clinical Data: RN CC has received a referral from CTS staff identifying a need for RN CC  to follow up with the patient once they are discharged.     Plan:  RN CC will outreach and follow up with the patient once they are discharged from the hospital. RN CC will continue to monitor for the patient to discharge.        Ambreen Bah RN, [facility]  RN Care Coordinator  [facility]  Phone # [phone]  2/2/2018 1:52 PM[NE1.1]         Revision History        User Key Date/Time User Provider Type Action    > NE1.1 2/2/2018  1:52 PM Ambreen Bah RN Care Coordinator Sign            Progress Notes by Ambreen Bah RN at 2/2/2018  1:51 PM     Author:  Ambreen Bah RN Service:  (none) Author Type:  Care Coordinator    Filed:  2/2/2018  1:51 PM Encounter Date:  2/2/2018 Status:  Signed    :  Ambreen Bah RN (Care Coordinator)           Transition Communication Hand-off for Care Transitions to Next Level of Care Provider    Name: Elly Choe  MRN #: [id number]  Primary Care Provider: Joy Kam  Primary Care MD Name: Joy Kam  Primary Clinic: [address] [place] [id number]  Primary Care Clinic Name: [facility]  Reason for Hospitalization:  right hip degenerative joint disease  Degenerative joint disease (DJD) of hip  Admit Date/Time: 1/31/2018  1:07 PM  Discharge Date: 2/3/18  Payor Source: Payor: MEDICA / Plan: MEDICA [employer] NON/[employer] PARTNERS / Product Type: Indemnity /     Readmission Assessment Measure (SMTIA) Risk Score/category: Low        Reason for Communication Hand-off Referral: TCU    Discharge Plan:  [facility] (Phone: [phone] Fax: [phone])       Concern for non-adherence with plan of care:   Y/N No  Discharge Needs Assessment:  Needs       Most Recent  Value    Equipment Currently Used at Home grab bar, shower chair, walker, rolling    Transportation Available public transportation    Other Resources Transportation Services    Transportation Agency -- [[employer]]    Transitional Care -- [[facility]]    [facility] Number [id number]                Katia Flannery RN, Care Coordinator    AVS/Discharge Summary is the source of truth; this is a helpful guide for improved communication of patient story[NE1.1]       Revision History        User Key Date/Time User Provider Type Action    > NE1.1 2/2/2018  1:51 PM Ambreen Bah, RN Care Coordinator Sign            Progress Notes by Malina Fernández PA-C at 2/2/2018  7:17 AM     Author:  Malina Fernández PA-C Service:  Internal Medicine Author Type:  Physician Assistant - C    Filed:  2/2/2018 11:34 AM Date of Service:  2/2/2018  7:17 AM Creation Time:  2/2/2018  7:17 AM    Status:  Signed :  Malina Fernández PA-C (Physician Assistant - C)         [facility] Medicine Progress Note  Date of Service: 02/02/2018    Assessment & Plan   Elly Choe is a 82 year old female who presented on 1/31/2018 for scheduled Procedure(s):  ARTHROPLASTY HIP by Sen Parry MD and is being followed by the hospital medicine service for co-management of acute and/or chronic perioperative medical problems.      S/p Procedure(s):  ARTHROPLASTY HIP   2 Days Post-Op    Pain[RL1.1] not well managed, though improved after norco[RL1.2]. Hg[RL1.1] 10.7.[RL1.3]   - pain control, wound cares, physical therapy, occupational therapy and DVT prophylaxis per orthopedic surgery service    Coronary artery disease involving native coronary artery of native heart without angina pectoris  4/2014 s/p cardiac cath with 2 drug eluting stents. Asymptomatic.  - hold PTA ASA while on full dose ASA     Idiopathic chronic gout of multiple sites without tophus  - continue PTA allupurinol     CKD  (chronic kidney disease) stage 3, GFR 30-59 ml/min  Baseline 1.2.      Acquired hypothyroidism  - continue PTA levothyroxine    DVT Prophylaxis: as per orthopedic surgery service -[RL1.1] Defer to primary service,  mg[RL1.3]  Code Status: Full Code    Lines: PIV left hand   Jackson catheter: None    Discussion: Medically, the patient appears[RL1.1] stable[RL1.2]. VSS.     Disposition: Anticipate discharge[RL1.1] Saturday to TCU.[RL1.3]     Attestation:  I have reviewed today's vital signs, notes, medications, labs and imaging.  Total time: 15 minutes    This patient was discussed with Dr. Charan Arenas. Plan as above.    Malina Fern[facility]dez [facility]      Interval History   Patient was seen[RL1.1] this morning. She is in good spirits.     Her pain has increased. She has not been taking many pain medications, she prefers to not use medications if she can. She did take a Norco this morning and her pain improved.  She feels her activity today is somewhat limited due to pain.  She has a good appetite.  Did not sleep well due to   Having BMs, passing gas. Voiding regularly.[RL1.2]  BM/Voiding/Gas[RL1.1]    Plan to discharge tomorrow to TCU.[RL1.2]    Denies HA, lightheadedness, dizziness, fever, chills, chest pain, palpitations, SOB, cough, wheezes, abdominal pain, N/V/D, numbness or tingling in[RL1.1] bilateral lower extremities.[RL1.2]    Physical Exam   Temp:  [97.7  F (36.5  C)-98.5  F (36.9  C)] 97.7  F (36.5  C)  Pulse:  [77-94] 77  Resp:  [18] 18  BP: (132-155)/(54-68) 132/55  SpO2:  [95 %-98 %] 95 %    Weights: There were no vitals filed for this visit. There is no height or weight on file to calculate BMI.    General: Appears[RL1.1] well this morning, lying in bed[RL1.2]. Alert and orientated. Pleasant and cooperative. NAD. Non-toxic. Appears stated age.   CV: Regular rate, normal rhythm. Radial pulses are 2+ bilaterally. Distal pulses[RL1.1] intact[RL1.2]. Capillary refill is < 2 seconds[RL1.1]  in bilateral lower extremities[RL1.2].[RL1.1] Trace l[RL1.2]ower extremity edema[RL1.1] to the mid-shin.[RL1.2]  Respiratory: No accessory muscle usage. Clear to auscultation bilaterally. No wheezes, crackles or rhonchi.   GI: Soft, non-tender, non-distended. Bowel sounds are normoactive in a quadrants.  Skin: Warm, dry, intact. Did not assess incision, dressing appear clean and dry.[RL1.1] Slight redness/bruising surrounding bandage.[RL1.2]  Musculoskeletal: Muscle tone is appropriate. Moves[RL1.1] all extremities[RL1.2] freely with limited range of motion[RL1.1] right lower extremity[RL1.2] due to pain and bandaged.  Neuro: Sensation to light touch of[RL1.1] bilateral lower extremities[RL1.2] is grossly intact.     Data     Recent Labs  Lab 02/01/18  0645 01/31/18  1349 01/26/18  1116   WBC  --  5.0 6.4   HGB 11.2* 14.4 13.5   MCV  --  94 96   PLT  --  159 187   INR  --  0.99  --    NA  --   --  132*   POTASSIUM  --   --  4.4   CHLORIDE  --   --  98   CO2  --   --  29   BUN  --   --  18   CR  --   --  1.24*   ANIONGAP  --   --  5   JANET  --   --  9.0   *  --  96         Recent Labs  Lab 02/01/18  0645 01/26/18  1116   * 96        Unresulted Labs Ordered in the Past 30 Days of this Admission     No orders found from 12/2/2017 to 2/1/2018.           Imaging  No results found for this or any previous visit (from the past 24 hour(s)).     I reviewed all new labs and imaging results over the last 24 hours. I personally reviewed no images or EKG's today.    Medications       allopurinol  100 mg Oral Daily     levothyroxine  75 mcg Oral QAM AC     multivitamin, therapeutic with minerals  1 tablet Oral Daily     sodium chloride (PF)  3 mL Intracatheter Q8H     acetaminophen  975 mg Oral Q8H     gabapentin  100 mg Oral Daily     senna-docusate  1-2 tablet Oral BID     aspirin  325 mg Oral Daily     I have discussed patient with Dr. Charan Arenas.    Malina Fernández, PA-C  [facility] Medicine[RL1.1]     Revision  History        User Key Date/Time User Provider Type Action    > RL1.2 2/2/2018 11:34 AM Malina Fernández PA-C Physician Assistant - GURMEET Sign     RL1.3 2/2/2018 10:08 AM Malina Fernández PA-C Physician Assistant - C      RL1.1 2/2/2018  7:17 AM Malina Fernández PA-C Physician Assistant - GURMEET             Progress Notes by Bethany Chavarria PA-C at 2/2/2018  7:45 AM     Author:  Bethany Chavarria PA-C Service:  Orthopedics Author Type:  Physician Assistant    Filed:  2/2/2018  8:15 AM Date of Service:  2/2/2018  7:45 AM Creation Time:  2/2/2018  7:45 AM    Status:  Signed :  Bethany Chavarria PA-C (Physician Assistant)         [facility] Progress Note      Post-operative Day: 2 Days Post-Op    Procedure(s):  Right total hip arthroplasty.  - Wound Class: I-Clean      Subjective:    Pain:[AG1.1] minimal[AG1.2]  Chest pain, SOB:[AG1.1]  No[AG1.2]      Objective:  Blood pressure 132/55, pulse 77, temperature 97.7  F (36.5  C), temperature source Oral, resp. rate 18, last menstrual period 01/14/2013, SpO2 95 %, not currently breastfeeding.    Patient Vitals for the past 24 hrs:   BP Temp Temp src Pulse Resp SpO2   02/01/18 2332 132/55 97.7  F (36.5  C) Oral 77 18 95 %   02/01/18 1604 155/54 98.5  F (36.9  C) Oral 79 18 98 %   02/01/18 0900 139/68 98.3  F (36.8  C) Oral 94 18 97 %       Wt Readings from Last 4 Encounters:   01/26/18 104.8 kg (231 lb)   01/09/18 106.1 kg (234 lb)   12/19/17 105.2 kg (232 lb)   11/03/17 104.8 kg (231 lb)     Motor function, sensation, and circulation intact[AG1.1]   Yes[AG1.2]  Wound status: incisions are clean dry and intact.[AG1.1] Yes[AG1.2]  Calf tenderness:[AG1.1] Right  mildly tender. Neg homans[AG1.2]    Pertinent Labs   Lab Results: personally reviewed.     Recent Labs   Lab Test  02/01/18   0645  01/31/18   1349  01/26/18   1116  01/02/18   1133  12/19/17   1213   05/02/16   1451   03/01/16   1308   12/29/12   0616  12/28/12   0610    04/14/11   0615   INR   --   0.99   --    --    --    --    --    --    --    --   2.28*  2.04*   < >  1.12   HGB  11.2*  14.4  13.5   --   14.0   < >  12.7   < >  13.2   < >   --   10.2*   < >  11.9   HCT   --   41.4  40.1   --   40.1   < >  38.2   < >  39.3   < >   --    --    < >   --    MCV   --   94  96   --   94   < >  97   < >  97   < >   --    --    < >   --    PLT   --   159  187   --   188   < >  169   < >  171   < >   --    --    < >   --    NA   --    --   132*  135  127*   < >  139   < >  138   < >   --    --    < >  134   CRP   --    --    --    --    --    --   3.7   --   <2.9   --    --    --    --   27.2*    < > = values in this interval not displayed.       Plan: Anticoagulation protocol:  mg daily  x 42  days            Pain medications:  norco            Weight bearing status:  WBAT            Disposition:  TCU saturday             Continue cares and rehabilitation[AG1.1]. Pt denies calf pain at rest, min swelling. Will observe, if persists or worsens order u/s to r/o DVT[AG1.2]    Report completed by:  Bethany Chavarria PA-C, PA-C  Date: 2/2/2018  Time: 7:45 AM[AG1.1]       Revision History        User Key Date/Time User Provider Type Action    > AG1.2 2/2/2018  8:15 AM Bethany Chavarria PA-C Physician Assistant Sign     AG1.1 2/2/2018  7:45 AM Bethany Chavarria PA-C Physician Assistant             Progress Notes by Katja Emanuel RN at 2/1/2018  4:43 PM     Author:  Katja Emanuel RN Service:  (none) Author Type:      Filed:  2/1/2018  4:43 PM Date of Service:  2/1/2018  4:43 PM Creation Time:  2/1/2018  4:43 PM    Status:  Signed :  Emanuel, Katja, RN ()         PAS-RR    D: Per DHS regulation, CRESENC[place] completed and submitted PAS-RR to [place] [employer] on [employer] Direct Connect via the Senior LinkAge Line.  PAS-RR confirmation # is : [id number]  P: Further questions may be directed to [facility] LinkAge Line at #[phone], option #4 for PAS-RR  staff.  Katja Emanuel, MSN, RN, Care Coordinator  [facility] [phone]  [facility] [phone][JM1.1]     Revision History        User Key Date/Time User Provider Type Action    > JM1.1 2/1/2018  4:43 PM Katja Emanuel, RN Case Manager Sign            Progress Notes by Rehana Van, PT at 2/1/2018  4:41 PM     Author:  Rehana Van, PT Service:  (none) Author Type:  Physical Therapist    Filed:  2/1/2018  4:42 PM Date of Service:  2/1/2018  4:41 PM Creation Time:  2/1/2018  4:41 PM    Status:  Signed :  Rehana Van PT (Physical Therapist)           Elly Choe  1935 02/01/18 1000   Quick Adds   Type of Visit Initial PT Evaluation   Living Environment   Lives With alone   Living Arrangements apartment   Home Accessibility no concerns   Living Environment Comment meals on wheels; house keeping once a week   Functional Level Prior   Ambulation 1-->assistive equipment   Transferring 0-->independent   Toileting 0-->independent   Fall history within last six months yes   Number of times patient has fallen within last six months 1   Which of the above functional risks had a recent onset or change? ambulation;transferring   General Information   Onset of Illness/Injury or Date of Surgery - Date 01/31/18   Referring Physician Comfort   Patient/Family Goals Statement get back to normal   Pertinent History of Current Problem (include personal factors and/or comorbidities that impact the POC) 1/31 R MARY ANNE; PMH:  Chronic kidney disease, CAD, HTN, choic obstructive airway; s/p TSA; s/p L TKA; morbid obesity   Weight-Bearing Status - RLE weight-bearing as tolerated   Cognitive Status Examination   Orientation orientation to person, place and time   Level of Consciousness alert   Personal Safety and Judgment at risk behaviors demonstrated  (moves quickly)   Cognitive Comment question memory/cognition due to 2 episodes in one hour not recalling hemovac and sequence with  "walking   Pain Assessment   Patient Currently in Pain No   Strength   Strength Comments AG yulissa   Clinical Impression   Criteria for Skilled Therapeutic Intervention yes, treatment indicated   PT Diagnosis s/p MARY ANNE; R hip pain and weakness   Influenced by the following impairments pain; obesity   Functional limitations due to impairments difficulty with ambulation and transfers   Clinical Presentation Stable/Uncomplicated   Clinical Decision Making (Complexity) Low complexity   Therapy Frequency` 2 times/day   Predicted Duration of Therapy Intervention (days/wks) 3   Anticipated Discharge Disposition Transitional Care Facility   Risk & Benefits of therapy have been explained Yes   Patient, Family & other staff in agreement with plan of care Yes   Clinical Impression Comments Pt will benefit from skilled intervention to improve functional mobility eventually allowing her to return to living independently   [facility] AM-PAC  \"6 Clicks\" V.2 Basic Mobility Inpatient Short Form   1. Turning from your back to your side while in a flat bed without using bedrails? 3 - A Little   2. Moving from lying on your back to sitting on the side of a flat bed without using bedrails? 3 - A Little   3. Moving to and from a bed to a chair (including a wheelchair)? 3 - A Little   4. Standing up from a chair using your arms (e.g., wheelchair, or bedside chair)? 3 - A Little   5. To walk in hospital room? 3 - A Little   6. Climbing 3-5 steps with a railing? 2 - A Lot   Basic Mobility Raw Score (Score out of 24.Lower scores equate to lower levels of function) 17   Total Evaluation Time   Total Evaluation Time (Minutes) 10[CM1.1]        Revision History        User Key Date/Time User Provider Type Action    > CM1.1 2/1/2018  4:42 PM Rehana Van, PT Physical Therapist Sign            Progress Notes by Malina Fernández PA-C at 2/1/2018  7:36 AM     Author:  Malina Fernández PA-C Service:  Internal Medicine Author " Type:  Physician Assistant - C    Filed:  2/1/2018 11:51 AM Date of Service:  2/1/2018  7:36 AM Creation Time:  2/1/2018  7:36 AM    Status:  Signed :  Malina Fernández PA-C (Physician Assistant - C)         [facility] Medicine Progress Note  Date of Service:[RL1.1] 02/01/2018    Assessment & Plan[RL1.2]   Elly Choe is a 82 year old female who presented on 1/31/2018 for scheduled Procedure(s):  ARTHROPLASTY HIP by Sen Parry MD and is being followed by the hospital medicine service for co-management of acute and/or chronic perioperative medical problems.      S/p Procedure(s):  ARTHROPLASTY HIP[RL1.1]   1 Day Post-Op[RL1.2]    Pain[RL1.1] well managed[RL1.3]. Hg[RL1.1] 11.2. Pre-op  14.4. 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 VSS[RL1.1]. Patient is in good spirits today without complaints.[RL1.3]    Disposition: Anticipate discharge[RL1.1] 1-2 days, pending process in physical therapy[RL1.3]    Attestation:  I have reviewed  today's vital signs, notes, medications, labs and imaging.  Total time:[RL1.1] 15[RL1.3] minutes    This patient was discussed with [RL1.1] Charan Arenas[RL1.3]. Plan as above.    Malina Fernández [facility] Medicine[RL1.1]      Interval History[RL1.2]   Patient was seen[RL1.1] this afternoon. She is in good spirits. Pain is well controlled, she feels it at times when she is moving but is comfortable at rest.    She has been participating in physical therapy and getting around ok. Does need assistance getting up.  Good appetite.  Slept well overnight.[RL1.3]  BM[RL1.1] this morning. Passing gas. Voiding regularly.  Reports some numbness bilaterally in lower extremities.[RL1.3]    Denies HA, lightheadedness, dizziness, fever, chills, chest pain, palpitations, SOB, cough, wheezes, abdominal pain, N/V/D, tingling in[RL1.1] bilateral lower extremities.[RL1.3]    Physical Exam   Temp:  [96  F (35.6  C)-98.5  F (36.9  C)] 98.3  F (36.8  C)  Pulse:  [70-92] 92  Resp:  [14-18] 18  BP: ()/() 134/54  SpO2:  [94 %-100 %] 98 %[RL1.2]    Weights:[RL1.1] There were no vitals filed for this visit. There is no height or weight on file to calculate BMI.[RL1.2]    General: Appears[RL1.1] well[RL1.3]. Alert and orientated. Pleasant and cooperative. NAD. Non-toxic. Appears stated age.   CV: Regular rate, normal rhythm. Radial pulses are 2+ bilaterally. Distal pulses[RL1.1] intact[RL1.3]. Lower extremity edema[RL1.1] to the mid-calf.[RL1.3]  Respiratory: No accessory muscle usage. Clear to auscultation bilaterally. No wheezes, crackles or rhonchi.   GI: Soft, non-tender, non-distended. Bowel sounds are normoactive in a quadrants.  Skin: Warm, dry, intact. Did not assess incision, dressing appear clean and dry.  Musculoskeletal: Muscle tone is appropriate. Moves[RL1.1] all extremities[RL1.3] freely with limited range of motion[RL1.1] right lower extremity[RL1.3] due to pain and bandaged.  Neuro: Sensation to light  touch of[RL1.1] bilateral lower extremities[RL1.3] is grossly intact.[RL1.1]     Data     Recent Labs  Lab 01/31/18  1349 01/26/18  1116   WBC 5.0 6.4   HGB 14.4 13.5   MCV 94 96    187   INR 0.99  --    NA  --  132*   POTASSIUM  --  4.4   CHLORIDE  --  98   CO2  --  29   BUN  --  18   CR  --  1.24*   ANIONGAP  --  5   JANET  --  9.0   GLC  --  96         Recent Labs  Lab 01/26/18  1116   GLC 96        Unresulted Labs Ordered in the Past 30 Days of this Admission     No orders found for last 61 day(s).[RL1.2]           Imaging[RL1.1]  Recent Results (from the past 24 hour(s))   XR Pelvis Port 1/2 Views    Narrative    PELVIS PORTABLE ONE - TWO VIEW   1/31/2018 4:07 PM     HISTORY: Arthroplasty hip.     COMPARISON: None.       Impression    IMPRESSION: Intraoperative film shows femoral and acetabular  components of a right hip arthroplasty in standard position.    SHIRA NANCE MD   XR Pelvis w Hip Right 1 View    Narrative    PELVIS AND HIP RIGHT ONE VIEW   1/31/2018 5:15 PM     HISTORY: Postop hip arthroplasty.     COMPARISON: None.       Impression    IMPRESSION: Unremarkable postoperative appearance of a right total hip  arthroplasty.    SHIRA NANCE MD[RL1.2]      I reviewed all new labs and imaging results over the last 24 hours. I personally reviewed[RL1.1] no images or EKG's today[RL1.3].[RL1.1]    Medications     NaCl 100 mL/hr at 01/31/18 2213       allopurinol  100 mg Oral Daily     levothyroxine  75 mcg Oral QAM AC     multivitamin, therapeutic with minerals  1 tablet Oral Daily     sodium chloride (PF)  3 mL Intracatheter Q8H     acetaminophen  975 mg Oral Q8H     gabapentin  100 mg Oral Daily     senna-docusate  1-2 tablet Oral BID     aspirin  325 mg Oral Daily[RL1.2]     I have discussed patient with[RL1.1] Dr. Charan Arenas.[RL1.3]    Malina Fernández PA-C  [facility] Medicine[RL1.1]     Revision History        User Key Date/Time User Provider Type Action    > RL1.3 2/1/2018 11:51 AM Pradeep  Malina Martinez PA-C Physician Assistant - GURMEET Sign     RL1.4 2/1/2018 11:14 AM Malina Fernández PA-C Physician Assistant - C      RL1.2 2/1/2018  7:40 AM Malina Fernández PA-C Physician Assistant - C      RL1.1 2/1/2018  7:36 AM Malina Fernández PA-C Physician Assistant - GURMEET             Progress Notes by Warner Garcia PA-C at 2/1/2018  7:54 AM     Author:  Warner Garcia PA-C Service:  Orthopedics Author Type:  Physician Assistant - GURMEET    Filed:  2/1/2018  7:57 AM Date of Service:  2/1/2018  7:54 AM Creation Time:  2/1/2018  7:54 AM    Status:  Signed :  Warner Garcia PA-C (Physician Assistant - GURMEET)         [facility]ics Progress Note      Post-operative Day: 1 Day Post-Op    Procedure(s):  Right total hip arthroplasty.  - Wound Class: I-Clean      Subjective:  Patient alert and very talkative.  She denies pain and feels great.  Pain: absent  Chest pain, SOB:  No, nasal cannula O2 on in bed currently      Objective:  Blood pressure 134/54, pulse 92, temperature 98.3  F (36.8  C), temperature source Oral, resp. rate 18, last menstrual period 01/14/2013, SpO2 98 %, not currently breastfeeding.    Patient Vitals for the past 24 hrs:   BP Temp Temp src Pulse Resp SpO2   02/01/18 0458 134/54 98.3  F (36.8  C) Oral 92 18 98 %   01/31/18 2235 130/58 97.9  F (36.6  C) Oral 88 18 99 %   01/31/18 2142 - 97.9  F (36.6  C) Oral - - -   01/31/18 1956 - 97.4  F (36.3  C) Axillary - - -   01/31/18 1945 (!) 107/39 - - 85 - 94 %   01/31/18 1900 113/48 96.4  F (35.8  C) Axillary - - -   01/31/18 1845 122/45 - - 83 - -   01/31/18 1844 - 96.3  F (35.7  C) Axillary - - -   01/31/18 1830 122/49 - - - - -   01/31/18 1826 - 96  F (35.6  C) Axillary - - -   01/31/18 1815 112/48 - - - - -   01/31/18 1801 100/53 - - - - -   01/31/18 1800 (!) 83/58 - - 80 14 98 %   01/31/18 1749 - 96.5  F (35.8  C) Axillary - - -   01/31/18 1740 139/48 - Oral 92 16 94 %   01/31/18 1720 - - - - - 98 %   01/31/18 1715  109/41 97.5  F (36.4  C) Oral 87 16 96 %   01/31/18 1700 135/61 - - 88 16 96 %   01/31/18 1645 127/57 97.5  F (36.4  C) Oral 87 16 100 %   01/31/18 1632 110/52 97.5  F (36.4  C) Oral 87 16 -   01/31/18 1328 (!) 169/102 98.5  F (36.9  C) Oral 70 16 95 %       Wt Readings from Last 4 Encounters:   01/26/18 104.8 kg (231 lb)   01/09/18 106.1 kg (234 lb)   12/19/17 105.2 kg (232 lb)   11/03/17 104.8 kg (231 lb)         Motor function, sensation, and circulation intact   Yes. Moving R LE independently  Wound status: incisions are clean dry and intact. Yes. Post op dressings CDI and hemovac intact  Calf tenderness: Bilateral  No    Pertinent Labs   Lab Results: personally reviewed.     Recent Labs   Lab Test  01/31/18   1349  01/26/18   1116  01/02/18   1133  12/19/17   1213   05/02/16   1451   03/01/16   1308   12/29/12   0616  12/28/12   0610   04/14/11   0615   INR  0.99   --    --    --    --    --    --    --    --   2.28*  2.04*   < >  1.12   HGB  14.4  13.5   --   14.0   < >  12.7   < >  13.2   < >   --   10.2*   < >  11.9   HCT  41.4  40.1   --   40.1   < >  38.2   < >  39.3   < >   --    --    < >   --    MCV  94  96   --   94   < >  97   < >  97   < >   --    --    < >   --    PLT  159  187   --   188   < >  169   < >  171   < >   --    --    < >   --    NA   --   132*  135  127*   < >  139   < >  138   < >   --    --    < >  134   CRP   --    --    --    --    --   3.7   --   <2.9   --    --    --    --   27.2*    < > = values in this interval not displayed.       Plan: Anticoagulation protocol:  mg daily  x 42  days            Pain medications:  We will start patient on PO norco today.  She did not tolerate oxycodone well with previous surgeries             Weight bearing status:  WBAT.  Patient motivated to get healthier and was encouraged to be in chair for meals today            Disposition:  TCU on Saturday. Patient prefers [facility]  Nursing to change dressings today and remove hemovac if present.    Only use dry 4x8 guaze over [facility] wound closure tape and then apply paper tape to hold dressings in place and place TEDs on   S.S. Consulted to assist with D/C planning   D/C [facility] and [facility] today            Continue cares and rehabilitation     Report completed by:  Warner Garcia PA-C  Date: 2/1/2018  Time: 7:54 AM[DL1.1]       Revision History        User Key Date/Time User Provider Type Action    > DL1.1 2/1/2018  7:57 AM Warner Garcia PA-C Physician Assistant - C Sign            Progress Notes by Peggy Jaramillo RN at 1/31/2018 10:55 PM     Author:  Peggy Jaramillo RN Service:  (none) Author Type:  Registered Nurse    Filed:  1/31/2018 10:56 PM Date of Service:  1/31/2018 10:55 PM Creation Time:  1/31/2018 10:55 PM    Status:  Signed :  Peggy Jaramillo RN (Registered Nurse)         Patient dangled at bedside. Numbness still present in right hip. +2 pedal pulses. Still reporting no pain.[LR1.1]     Revision History        User Key Date/Time User Provider Type Action    > LR1.1 1/31/2018 10:56 PM Peggy Jaramillo RN Registered Nurse Sign            Progress Notes by Peggy Jaramillo RN at 1/31/2018  5:48 PM     Author:  Peggy Jaramillo RN Service:  (none) Author Type:  Registered Nurse    Filed:  1/31/2018  5:50 PM Date of Service:  1/31/2018  5:48 PM Creation Time:  1/31/2018  5:48 PM    Status:  Signed :  Peggy Jaramillo RN (Registered Nurse)         [facility] ADMISSION NOTE    Patient admitted to room 2306 at approximately 1730 via cart from surgery. Patient was accompanied by transport tech.     Verbal SBAR report received from SONG Shaikh prior to patient arrival.     Patient trasferred to bed via air ginger. Patient alert and oriented X 3. The patient is not having any pain.  . Admission vital signs: Blood pressure 139/48, pulse 92, temperature 97.5  F (36.4  C), temperature source Oral, resp. rate 16, last menstrual period 01/14/2013, SpO2 94 %, not currently breastfeeding.  Patient was oriented to plan of care, call light, bed controls, tv, telephone, bathroom and visiting hours.     The following safety risks were identified during admission: fall and skin. Yellow risk band applied: YES.     Pegyg Jaramillo RN[LR1.1]       Revision History        User Key Date/Time User Provider Type Action    > LR1.1 1/31/2018  5:50 PM Peggy Jaramillo, RN Registered Nurse Sign                  Procedure Notes     No notes of this type exist for this encounter.         Progress Notes - Therapies (Notes from 01/31/18 through 02/03/18)      Progress Notes by Rehana Van, PT at 2/1/2018  4:41 PM     Author:  Rehana Van PT Service:  (none) Author Type:  Physical Therapist    Filed:  2/1/2018  4:42 PM Date of Service:  2/1/2018  4:41 PM Creation Time:  2/1/2018  4:41 PM    Status:  Signed :  Rehana Van PT (Physical Therapist)           Elly Mansivinny Choe  1935 02/01/18 1000   Quick Adds   Type of Visit Initial PT Evaluation   Living Environment   Lives With alone   Living Arrangements apartment   Home Accessibility no concerns   Living Environment Comment meals on wheels; house keeping once a week   Functional Level Prior   Ambulation 1-->assistive equipment   Transferring 0-->independent   Toileting 0-->independent   Fall history within last six months yes   Number of times patient has fallen within last six months 1   Which of the above functional risks had a recent onset or change? ambulation;transferring   General Information   Onset of Illness/Injury or Date of Surgery - Date 01/31/18   Referring Physician Comfort   Patient/Family Goals Statement get back to normal   Pertinent History of Current Problem (include personal factors and/or comorbidities that impact the POC) 1/31 R MARY ANNE; PMH:  Chronic kidney disease, CAD, HTN, choic obstructive airway; s/p TSA; s/p L TKA; morbid obesity   Weight-Bearing Status - RLE weight-bearing as tolerated  "  Cognitive Status Examination   Orientation orientation to person, place and time   Level of Consciousness alert   Personal Safety and Judgment at risk behaviors demonstrated  (moves quickly)   Cognitive Comment question memory/cognition due to 2 episodes in one hour not recalling hemovac and sequence with walking   Pain Assessment   Patient Currently in Pain No   Strength   Strength Comments AG yulissa   Clinical Impression   Criteria for Skilled Therapeutic Intervention yes, treatment indicated   PT Diagnosis s/p MARY ANNE; R hip pain and weakness   Influenced by the following impairments pain; obesity   Functional limitations due to impairments difficulty with ambulation and transfers   Clinical Presentation Stable/Uncomplicated   Clinical Decision Making (Complexity) Low complexity   Therapy Frequency` 2 times/day   Predicted Duration of Therapy Intervention (days/wks) 3   Anticipated Discharge Disposition Transitional Care Facility   Risk & Benefits of therapy have been explained Yes   Patient, Family & other staff in agreement with plan of care Yes   Clinical Impression Comments Pt will benefit from skilled intervention to improve functional mobility eventually allowing her to return to living independently   [facility] AM-PAC  \"6 Clicks\" V.2 Basic Mobility Inpatient Short Form   1. Turning from your back to your side while in a flat bed without using bedrails? 3 - A Little   2. Moving from lying on your back to sitting on the side of a flat bed without using bedrails? 3 - A Little   3. Moving to and from a bed to a chair (including a wheelchair)? 3 - A Little   4. Standing up from a chair using your arms (e.g., wheelchair, or bedside chair)? 3 - A Little   5. To walk in hospital room? 3 - A Little   6. Climbing 3-5 steps with a railing? 2 - A Lot   Basic Mobility Raw Score (Score out of 24.Lower scores equate to lower levels of function) 17   Total Evaluation Time   Total Evaluation Time (Minutes) 10[CM1.1] "        Revision History        User Key Date/Time User Provider Type Action    > CM1.1 2/1/2018  4:42 PM Rehana Van, PT Physical Therapist Sign

## 2018-01-31 NOTE — PROGRESS NOTES
[place] [facility] [place]    Patient admitted to room 2306 at approximately 1730 via cart from surgery. Patient was accompanied by transport tech.     Verbal SBAR report received from SONG Shaikh prior to patient arrival.     Patient trasferred to bed via air ginger. Patient alert and oriented X 3. The patient is not having any pain.  . Admission vital signs: Blood pressure 139/48, pulse 92, temperature 97.5  F (36.4  C), temperature source Oral, resp. rate 16, last menstrual period 01/14/2013, SpO2 94 %, not currently breastfeeding. Patient was oriented to plan of care, call light, bed controls, tv, telephone, bathroom and visiting hours.     The following safety risks were identified during admission: fall and skin. Yellow risk band applied: YES.     Peggy Jaramillo RN

## 2018-01-31 NOTE — LETTER
Transition Communication Hand-off for Care Transitions to Next Level of Care Provider    Name: Elly Choe  MRN #: [id number]  Primary Care Provider: Joy Kam  Primary Care MD Name: Joy Kam  Primary Clinic: [id number] [address]  Primary Care Clinic Name: [facility]  Reason for Hospitalization:  right hip degenerative joint disease  Degenerative joint disease (DJD) of hip  Admit Date/Time: 1/31/2018  1:07 PM  Discharge Date: 2/3/18  Payor Source: Payor: MEDICA / Plan: MEDICA [employer] NON/[employer] PARTNERS / Product Type: Indemnity /     Readmission Assessment Measure (SMITA) Risk Score/category: Low        Reason for Communication Hand-off Referral: TCU    Discharge Plan:  [place] [facility] (Phone: [phone] Fax: [phone])       Concern for non-adherence with plan of care:   Y/N No  Discharge Needs Assessment:  Needs       Most Recent Value    Equipment Currently Used at Home grab bar, shower chair, walker, rolling    Transportation Available public transportation    Other Resources Transportation Services    Transportation Agency -- [[employer]]    Transitional Care -- [[facility]]    PAS Number [id number]                Katia Flannery RN, Care Coordinator    AVS/Discharge Summary is the source of truth; this is a helpful guide for improved communication of patient story

## 2018-01-31 NOTE — OP NOTE
Total Hip Arthoplasty Operative Note        PLAN:  Weight bearing status: Weight bearing as tolerated   Activity: Activity as tolerated  Patient may move about with assist as indicated or with supervision   Anticoagulation plan:                  mg daily  for 42 days  Follow up plan                           Follow up in 2 week(s)        Name: Elly Choe    PCP: Joy Kam    Procedure Date: 1/31/2018    Pre-operative diagnosis: right hip degenerative joint disease   Post-operative diagnosis: Same   Procedure: Total hip arthoplasty (Right)   Surgeon: Sen Parry MD     Assistant(s): Warner Garcia PA-C   Anesthesia: Spinal Anesthesia   Estimated blood loss: 400 ml   Drains: Hemovac   Specimens: None       Findings: See full dictated operative note for details   Complications: None       Comments: See dictated operative report for full details         Procedure and Findings:    After being informed of the risks, benefits, and alternatives to the procedure, the patient desired to proceed. Brought to the main operating suite where she was placed under spinal anesthetic. She was positioned in an Innomed hip connelly. The patient s Right lower extremity was prepped and draped in a manner appropriate for the procedure after a timeout verification step was complete.    Patient received 2 grams of intravenous Ancef. Warner Garcia PA-C was present for the entire length of the case for the purposes of proper patient positioning, surgical exposure, and patient safety.    A minimally invasive posterior approach to the hip was taken. IT band was divided. Gluteus fibers divided and the Charnley retractor was placed. Attention was directed towards the external rotators. The piriformis was identified and tagged. Minimus was elevated. The capsule was teed and tagged. Hip leg length and offset were then determined using a Smith and Nephew device with a pin in the innominate and the hip was then dislocated.  The neck was resected using the Saleem guide. Attention was directed toward the acetabulum. Retractors were placed. Soft tissues were resected. Sequential reaming from 44 to 47 mm was carried out. Good cancellous bleeding bed was demonstrated. The trail 46 mm cup was stable. The final 46 mm Trident HA PSL cup was selected and secured with 2 supplemental fixation screws. A 10 degree liner was placed. Attention was directed towards the femur. Box chisel, canal finder, sequential broaching up to 5 was carried out. Trial reductions were carried out and excellent range of motion and stability and restoration of leg length and offset was demonstrated with a 0,32 head. X-ray was obtained which demonstrated appropriate sizing and positioning of components. The trial components were then removed. The final 10 degree liner was secured. Inferior osteophytes were resected from the acetabulum. The implant 5, Accolade 2,127 degree offset stem was the secured. Trial reductions were carried out and a 0, 32 mm head was selected. The hip was reduced. The joint was copiously irrigated. The joint capsule and piriformis tendon was repaired back to the greater trochanter through drill holes in bone. Soft tissues were infiltrated with anesthetic solution. Care was taken to avoid neurovascular structures.   The IT band was closed with running #1 running Ethibond and #1 Vicryl running stitch. Subcutaneous closure With layered 2-0 Vicryl, skin was closed with 3-0 Stratafix and Prineo. Sterile dressing was applied. Hip abductor pillow was placed. The patient returned to PAR in stable condition.       Sen Parry    Date: 1/31/2018 Time: 4:39 PM      CONFIDENTIALITY NOTICE This message and any included attachments are from [facility] and are intended only for the addressee. The information contained in this message is confidential and may constitute inside or non-public information under international, federal, or state  securities laws. Unauthorized forwarding, printing, copying, distribution, or use of such information is strictly prohibited and may be unlawful. If you are not the addressee, please promptly delete this message and notify the sender of the delivery error by e-mail.

## 2018-01-31 NOTE — ANESTHESIA POSTPROCEDURE EVALUATION
Patient: Elly Choe    Procedure(s):  Right total hip arthroplasty.  - Wound Class: I-Clean    Diagnosis:right hip degenerative joint disease  Diagnosis Additional Information: No value filed.    Anesthesia Type:  Spinal    Note:  Anesthesia Post Evaluation    Patient location during evaluation: PACU and Bedside  Patient participation: Able to participate in evaluation but full recovery from regional anesthesia has not yet ocurrred but is anticipated to occur within 48 hours  Level of consciousness: awake and alert  Pain management: adequate  Airway patency: patent  Cardiovascular status: acceptable  Respiratory status: acceptable  Hydration status: acceptable  PONV: none     Anesthetic complications: None          Last vitals:  Vitals:    01/31/18 1328 01/31/18 1632 01/31/18 1645   BP: (!) 169/102 110/52 127/57   Pulse: 70 87 87   Resp: 16 16 16   Temp: 36.9  C (98.5  F) 36.4  C (97.5  F) 36.4  C (97.5  F)   SpO2: 95%  100%         Electronically Signed By: ROSA Otoole CRNA  January 31, 2018  5:02 PM

## 2018-01-31 NOTE — OR NURSING
Pt had recent fall 1/30/18 at home., Deputy [place]o called and assist was needed for the pt to get back up. Pt was not assessed by paramedics or physician.  Pt has multiple bruises over body, left lower shin, left shoulder area and also a bruise on her lower left lip.  Pt also has many old surgical scars over several joints. Right shoulder, right knee.

## 2018-01-31 NOTE — IP AVS SNAPSHOT
"          [facility]: [phone]                                              INTERAGENCY TRANSFER FORM - LAB / IMAGING / EKG / EMG RESULTS   2018                    Hospital Admission Date: 2018  TIFFANY RAMEY   : 1935  Sex: Female        Attending Provider: Sen Parry MD     Allergies:  Sulfa Drugs    Infection:  None   Service:  SURGERY    Ht:  1.6 m (5' 3\")   Wt:  104.8 kg (231 lb)   Admission Wt:  --    BMI:  40.92 kg/m 2   BSA:  2.16 m 2            Patient PCP Information     Provider PCP Type    Joy Kam, ROSA CNP General         Lab Results - 3 Days      Glucose [[id number]]  Resulted: 18 0900, Result status: Final result    Ordering provider: Sen Parry MD  18 0000 Resulting lab: [facility]    Specimen Information    Type Source Collected On   Blood  18 0749          Components       Value Reference Range Flag Lab   Glucose 77 70 - 99 mg/dL  59            Hemoglobin [[id number]] (Abnormal)  Resulted: 18 0827, Result status: Final result    Ordering provider: Sen Parry MD  18 0000 Resulting lab: [facility]    Specimen Information    Type Source Collected On   Blood  18 0749          Components       Value Reference Range Flag Lab   Hemoglobin 10.7 11.7 - 15.7 g/dL L 59            Hemoglobin [[id number]] (Abnormal)  Resulted: 18 0847, Result status: Final result    Ordering provider: Sen Parry MD  18 0001 Resulting lab: [facility]    Specimen Information    Type Source Collected On   Blood  18 0645          Components       Value Reference Range Flag Lab   Hemoglobin 11.2 11.7 - 15.7 g/dL L 59            Glucose [[id number]] (Abnormal)  Resulted: 18 0841, Result status: Final result    Ordering provider: Sen Parry MD  18 0001 Resulting lab: [facility]    Specimen " Information    Type Source Collected On   Blood  02/01/18 0645          Components       Value Reference Range Flag Lab   Glucose 118 70 - 99 mg/dL H 59            CBC with platelets differential [[id number]]  Resulted: 01/31/18 1409, Result status: Final result    Ordering provider: Warner Garcia PA-C  01/31/18 1328 Resulting lab: [facility]    Specimen Information    Type Source Collected On   Blood  01/31/18 1349          Components       Value Reference Range Flag Lab   WBC 5.0 4.0 - 11.0 10e9/L  59   RBC Count 4.43 3.8 - 5.2 10e12/L  59   Hemoglobin 14.4 11.7 - 15.7 g/dL  59   Hematocrit 41.4 35.0 - 47.0 %  59   MCV 94 78 - 100 fl  59   MCH 32.5 26.5 - 33.0 pg  59   MCHC 34.8 31.5 - 36.5 g/dL  59   RDW 12.9 10.0 - 15.0 %  59   Platelet Count 159 150 - 450 10e9/L  59   Diff Method Automated Method   59   % Neutrophils 58.6 %  59   % Lymphocytes 26.4 %  59   % Monocytes 7.2 %  59   % Eosinophils 7.0 %  59   % Basophils 0.6 %  59   % Immature Granulocytes 0.2 %  59   Absolute Neutrophil 2.9 1.6 - 8.3 10e9/L  59   Absolute Lymphocytes 1.3 0.8 - 5.3 10e9/L  59   Absolute Monocytes 0.4 0.0 - 1.3 10e9/L  59   Absolute Eosinophils 0.4 0.0 - 0.7 10e9/L  59   Absolute Basophils 0.0 0.0 - 0.2 10e9/L  59   Abs Immature Granulocytes 0.0 0 - 0.4 10e9/L  59            INR [[id number]]  Resulted: 01/31/18 1407, Result status: Final result    Ordering provider: Warner Garcia PA-C  01/31/18 1328 Resulting lab: [facility]    Specimen Information    Type Source Collected On   Blood  01/31/18 1349          Components       Value Reference Range Flag Lab   INR 0.99 0.86 - 1.14  59            Testing Performed By     Lab - Abbreviation Name Director Address Valid Date Range    59 - Unknown [facility] Unknown [address] [facility] [address] 12/31/14 1006 - Present            Unresulted Labs (24h ago through future)    Start       Ordered    Unscheduled  Hemoglobin   CONDITIONAL X 2,   Routine     Comments:  Release on POD 1 and POD 2 if the morning Hgb is less than 8.0    01/31/18 1748         Imaging Results - 3 Days      XR Pelvis Port 1/2 Views [[id number]]  Resulted: 01/31/18 2323, Result status: Final result    Ordering provider: Sen Parry MD  01/31/18 0336 Resulted by: Shira Benson MD    Performed: 01/31/18 1535 - 01/31/18 1607 Resulting lab: RADIOLOGY RESULTS    Narrative:       PELVIS PORTABLE ONE - TWO VIEW   1/31/2018 4:07 PM     HISTORY: Arthroplasty hip.     COMPARISON: None.       Impression:       IMPRESSION: Intraoperative film shows femoral and acetabular  components of a right hip arthroplasty in standard position.    SHIRA BENSON MD      XR Pelvis w Hip Right 1 View [[id number]]  Resulted: 01/31/18 2323, Result status: Final result    Ordering provider: eSn Parry MD  01/31/18 1701 Resulted by: Shira Benson MD    Performed: 01/31/18 1703 - 01/31/18 1715 Resulting lab: RADIOLOGY RESULTS    Narrative:       PELVIS AND HIP RIGHT ONE VIEW   1/31/2018 5:15 PM     HISTORY: Postop hip arthroplasty.     COMPARISON: None.       Impression:       IMPRESSION: Unremarkable postoperative appearance of a right total hip  arthroplasty.    SHIRA BENSON MD      Testing Performed By     Lab - Abbreviation Name Director Address Valid Date Range    104 - Rad Rslts RADIOLOGY RESULTS Unknown Unknown 02/16/05 1553 - Present            Encounter-Level Documents:     There are no encounter-level documents.      Order-Level Documents:     There are no order-level documents.

## 2018-01-31 NOTE — IP AVS SNAPSHOT
` `     [facility] [place] SURGICAL: [phone]            Medication Administration Report for Elly Choe as of 02/03/18 1303   Legend:    Given Hold Not Given Due Canceled Entry Other Actions    Time Time (Time) Time  Time-Action       Inactive    Active    Linked        Medications 01/28/18 01/29/18 01/30/18 01/31/18 02/01/18 02/02/18 02/03/18    acetaminophen (TYLENOL) tablet 650 mg  Dose: 650 mg  Freq: EVERY 4 HOURS PRN Route: PO  PRN Reason: other  PRN Comment: surgical pain  Start: 02/03/18 0000   Admin Instructions: May give first dose 4 hours after last scheduled dose of acetaminophen.  Maximum acetaminophen dose from all sources = 75 mg/kg/day not to exceed 4 grams/day.               acetaminophen (TYLENOL) tablet 975 mg  Dose: 975 mg  Freq: EVERY 8 HOURS Route: PO  Start: 01/31/18 1800   End: 02/03/18 1959   Admin Instructions: Do not use if patient has an active opioid/acetaminophen analgesic order for pain  Maximum acetaminophen dose from all sources = 75 mg/kg/day not to exceed 4 grams/day.        1957 (975 mg)-Given        (0459)-Not Given       1138 (975 mg)-Given       (2012)-Not Given [C]        (0556)-Not Given       (1236)-Not Given [C]       1959 (975 mg)-Given        (0541)-Not Given       1118 (975 mg)-Given       1959-Med Discontinued       allopurinol (ZYLOPRIM) tablet 100 mg  Dose: 100 mg  Freq: DAILY Route: PO  Start: 01/31/18 1800       1957 (100 mg)-Given        0917 (100 mg)-Given        0814 (100 mg)-Given        0735 (100 mg)-Given           aspirin tablet 325 mg  Dose: 325 mg  Freq: DAILY Route: PO  Start: 02/01/18 0800        0917 (325 mg)-Given        0813 (325 mg)-Given        0735 (325 mg)-Given           benzocaine-menthol (CEPACOL) 15-3.6 MG lozenge 1-2 lozenge  Dose: 1-2 lozenge  Freq: EVERY 1 HOUR PRN Route: BU  PRN Reason: sore throat  PRN Comment: sore throat without fever  Start: 01/31/18 1748              HYDROcodone-acetaminophen (NORCO) 5-325 MG per  "tablet 1-2 tablet  Dose: 1-2 tablet  Freq: EVERY 4 HOURS PRN Route: PO  PRN Reason: moderate to severe pain  Start: 01/31/18 2315   Admin Instructions: Maximum acetaminophen dose from all sources= 75 mg/kg/day not to exceed 4 grams         1502 (1 tablet)-Given       2010 (1 tablet)-Given        0554 (1 tablet)-Given       1236 (1 tablet)-Given       1701 (1 tablet)-Given       2113 (1 tablet)-Given        0121 (1 tablet)-Given       0630 (1 tablet)-Given           HYDROmorphone (PF) (DILAUDID) injection 0.3-0.5 mg  Dose: 0.3-0.5 mg  Freq: EVERY 3 HOURS PRN Route: IV  PRN Reason: moderate to severe pain  Start: 01/31/18 1934   Admin Instructions: For ordered doses up to 4 mg give IV Push undiluted. Administer each 2mg over 2-5 minutes.         0245 (0.5 mg)-Given             hydrOXYzine (ATARAX) tablet 10 mg  Dose: 10 mg  Freq: EVERY 6 HOURS PRN Route: PO  PRN Reason: itching  Start: 01/31/18 1748   Admin Instructions: Caution to be used when administering multiple CNS depressing meds within a short time frame.               levothyroxine (SYNTHROID/LEVOTHROID) tablet 75 mcg  Dose: 75 mcg  Freq: EVERY MORNING BEFORE BREAKFAST Route: PO  Start: 02/01/18 0630   Admin Instructions: Separate oral administration of iron- or calcium-containing products and levothyroxine by at least 4 hours.         0613 (75 mcg)-Given        0554 (75 mcg)-Given        0630 (75 mcg)-Given           lidocaine (LMX4) kit  Freq: EVERY 1 HOUR PRN Route: Top  PRN Reason: pain  PRN Comment: with VAD insertion or accessing implanted port.  Start: 01/31/18 1748   Admin Instructions: Do NOT give if patient has a history of allergy to any local anesthetic or any \"mercedes\" product.   Apply 30 minutes prior to VAD insertion or port access.  MAX Dose:  2.5 g (  of 5 g tube)               lidocaine 1 % 1 mL  Dose: 1 mL  Freq: EVERY 1 HOUR PRN Route: OTHER  PRN Comment: mild pain with VAD insertion or accessing implanted port  Start: 01/31/18 1748   Admin " "Instructions: Do NOT give if patient has a history of allergy to any local anesthetic or any \"mercedes\" product. MAX dose 1 mL subcutaneous OR intradermal in divided doses.               multivitamin, therapeutic with minerals (THERA-VIT-M) tablet 1 tablet  Dose: 1 tablet  Freq: DAILY Route: PO  Start: 01/31/18 1800       (1946)-Not Given [C]        0917 (1 tablet)-Given        0814 (1 tablet)-Given        0735 (1 tablet)-Given           naloxone (NARCAN) injection 0.1-0.4 mg  Dose: 0.1-0.4 mg  Freq: EVERY 2 MIN PRN Route: IV  PRN Reason: opioid reversal  Start: 01/31/18 1748   Admin Instructions: For respiratory rate LESS than or EQUAL to 8.  Partial reversal dose:  0.1 mg titrated q 2 minutes for Analgesia Side Effects Monitoring Sedation Level of 3 (frequently drowsy, arousable, drifts to sleep during conversation).Full reversal dose:  0.4 mg bolus for Analgesia Side Effects Monitoring Sedation Level of 4 (somnolent, minimal or no response to stimulation).  For ordered doses up to 2mg give IVP. Give each 0.4mg over 15 seconds in emergency situations. For non-emergent situations further dilute in 9mL of NS to facilitate titration of response.               ondansetron (ZOFRAN-ODT) ODT tab 4 mg  Dose: 4 mg  Freq: EVERY 6 HOURS PRN Route: PO  PRN Reasons: nausea,vomiting  Start: 01/31/18 1748   Admin Instructions: This is Step 1 of nausea and vomiting management.  If nausea not resolved in 15 minutes, go to Step 2 prochlorperazine (COMPAZINE). Do not push through foil backing. Peel back foil and gently remove. Place on tongue immediately. Administration with liquid unnecessary  With dry hands, peel back foil backing and gently remove tablet; do not push oral disintegrating tablet through foil backing; administer immediately on tongue and oral disintegrating tablet dissolves in seconds; then swallow with saliva; liquid not required.              Or  ondansetron (ZOFRAN) injection 4 mg  Dose: 4 mg  Freq: EVERY 6 HOURS " PRN Route: IV  PRN Reasons: nausea,vomiting  Start: 01/31/18 1748   Admin Instructions: This is Step 1 of nausea and vomiting management.  If nausea not resolved in 15 minutes, go to Step 2 prochlorperazine (COMPAZINE).  Irritant. For ordered doses up to 4 mg, give IV Push undiluted over 2-5 minutes.               prochlorperazine (COMPAZINE) injection 5 mg  Dose: 5 mg  Freq: EVERY 6 HOURS PRN Route: IV  PRN Reasons: nausea,vomiting  Start: 01/31/18 1748   Admin Instructions: This is Step 2 of nausea and vomiting management.   If nausea not resolved in 15 minutes, give metoclopramide (REGLAN) if ordered (step 3 of nausea and vomiting management)  For ordered doses up to 10 mg, give IV Push undiluted. Each 5mg over 1 minute.              Or  prochlorperazine (COMPAZINE) tablet 5 mg  Dose: 5 mg  Freq: EVERY 6 HOURS PRN Route: PO  PRN Reasons: nausea,vomiting  Start: 01/31/18 1748   Admin Instructions: This is Step 2 of nausea and vomiting management.   If nausea not resolved in 15 minutes, give metoclopramide (REGLAN) if ordered (step 3 of nausea and vomiting management)               senna-docusate (SENOKOT-S;PERICOLACE) 8.6-50 MG per tablet 1-2 tablet  Dose: 1-2 tablet  Freq: 2 TIMES DAILY Route: PO  Start: 01/31/18 2000   Admin Instructions: Start with 1 tablet PO BID, If no bowel movement in 24 hours, increase to 2 tablets PO BID.  Hold for loose stools.        1957 (1 tablet)-Given        0917 (1 tablet)-Given       (1639)-Not Given [C]        (0815)-Not Given       1959 (1 tablet)-Given        0735 (1 tablet)-Given       [ ] 2000           sodium chloride (PF) 0.9% PF flush 3 mL  Dose: 3 mL  Freq: EVERY 8 HOURS Route: IK  Start: 01/31/18 1800   Admin Instructions: And Q1H PRN, to lock peripheral IV dormant line.        (1759)-Not Given        (0114)-Not Given       0918 (3 mL)-Given       2011 (3 mL)-Given        0556 (3 mL)-Given       1049 (3 mL)-Given       1553 (3 mL)-Given               0125 (3  mL)-Given       (1028)-Not Given       [ ] 1800           sodium chloride (PF) 0.9% PF flush 3 mL  Dose: 3 mL  Freq: EVERY 1 HOUR PRN Route: IK  PRN Reason: line flush  PRN Comment: for peripheral IV flush post IV meds  Start: 01/31/18 1748              zolpidem (AMBIEN) tablet 5 mg  Dose: 5 mg  Freq: AT BEDTIME PRN Route: PO  PRN Reason: sleep  Start: 02/01/18 2000   Admin Instructions: POD 1.  Do not give unless at least 6 hours of uninterrupted sleep is expected.              Completed Medications  Medications 01/28/18 01/29/18 01/30/18 01/31/18 02/01/18 02/02/18 02/03/18         Dose: 1,000 mg  Freq: ONCE Route: PO  Start: 01/31/18 1430   End: 01/31/18 1431   Admin Instructions: Maximum acetaminophen dose from all sources = 75 mg/kg/day not to exceed 4 gram        1431 (1,000 mg)-Given                Dose: 2 g  Freq: EVERY 8 HOURS Route: IV  Indications of Use: PERIOPERATIVE PHARMACOPROPHYLAXIS  Start: 01/31/18 2230   End: 02/01/18 0644   Admin Instructions: First post-op dose due 8 hours after intra-op dose, see eMAR.          2213 (2 g)-Given        0614 (2 g)-Given               Dose: 2 g  Freq: PRE-OP/PRE-PROCEDURE Route: IV  Indications of Use: PERIOPERATIVE PHARMACOPROPHYLAXIS  Start: 01/31/18 1425   End: 01/31/18 1424   Admin Instructions: Give first dose within 1 hour PRIOR to incision. If patient weight is greater than or equal to 120 kg increase dose to 3 g.        1424 (2 g)-Given                Dose: 100 mg  Freq: DAILY Route: PO  Start: 01/31/18 1800   End: 02/02/18 0814       1957 (100 mg)-Given        0917 (100 mg)-Given        0814 (100 mg)-Given              Dose: 100 mg  Freq: PRE-OP/PRE-PROCEDURE Route: PO  Start: 01/31/18 1425   End: 01/31/18 1431   Admin Instructions: For pain with neuropathic features        1431 (100 mg)-Given                Dose: 1 g  Freq: ONCE Route: IV  Start: 01/31/18 1430   End: 01/31/18 1424   Admin Instructions: Each 1 gram to be infused over 10 minutes.         1424 (1 g)-New Bag             Discontinued Medications  Medications 01/28/18 01/29/18 01/30/18 01/31/18 02/01/18 02/02/18 02/03/18         Rate: 100 mL/hr   Freq: CONTINUOUS Route: IV  Last Dose: Stopped (02/01/18 0918)  Start: 01/31/18 1800   End: 02/01/18 1641   Admin Instructions: Change to saline lock when PO well tolerated.        1759 ( )-Rate/Dose Verify       2213 ( )-New Bag        0918-Stopped       1641-Med Discontinued           Dose: 1,000 mg  Freq: ONCE Route: PO  Start: 01/31/18 1330   End: 01/31/18 1358   Admin Instructions: Maximum acetaminophen dose from all sources = 75 mg/kg/day not to exceed 4 gram               1358-Med Discontinued            Dose: 1,000 mg  Freq: ONCE Route: PO  Start: 01/31/18 1330   End: 01/31/18 1358   Admin Instructions: Maximum acetaminophen dose from all sources = 75 mg/kg/day not to exceed 4 gram               1358-Med Discontinued            Dose: 325 mg  Freq: EVERY 4 HOURS PRN Route: PO  PRN Reasons: mild pain,fever  Start: 01/31/18 1748   End: 01/31/18 2320   Admin Instructions: Maximum acetaminophen dose from all sources = 75 mg/kg/day not to exceed 4 grams/day.        2320-Med Discontinued            Dose: 325 mg  Freq: 2 TIMES DAILY. Route: PO  Start: 01/31/18 2100   End: 01/31/18 1855       1855-Med Discontinued            Dose: 1 g  Freq: SEE ADMIN INSTRUCTIONS Route: IV  Indications of Use: PERIOPERATIVE PHARMACOPROPHYLAXIS  Start: 01/31/18 1425   End: 01/31/18 1640   Admin Instructions: Intra-Op Dose.  Give every 2 hours while patient in surgery, starting 2 hours after pre-op dose.  DO NOT GIVE intra-op dose if CrCl less than 10 mL/min (on dialysis).  If CrCL less than 50 mL/min, double the time interval between doses.        1640-Med Discontinued            Dose: 1 g  Freq: SEE ADMIN INSTRUCTIONS Route: IV  Indications of Use: PERIOPERATIVE PHARMACOPROPHYLAXIS  Start: 01/31/18 1328   End: 01/31/18 1330   Admin Instructions: Intra-Op Dose.  Give every 2  hours while patient in surgery, starting 2 hours after pre-op dose.  DO NOT GIVE intra-op dose if CrCl less than 10 mL/min (on dialysis).  If CrCL less than 50 mL/min, double the time interval between doses.        1330-Med Discontinued            Dose: 1 g  Freq: SEE ADMIN INSTRUCTIONS Route: IV  Indications of Use: PERIOPERATIVE PHARMACOPROPHYLAXIS  Start: 01/31/18 1328   End: 01/31/18 1358   Admin Instructions: Intra-Op Dose.  Give every 2 hours while patient in surgery, starting 2 hours after pre-op dose.  DO NOT GIVE intra-op dose if CrCl less than 10 mL/min (on dialysis).  If CrCL less than 50 mL/min, double the time interval between doses.        1358-Med Discontinued            Dose: 2 g  Freq: PRE-OP/PRE-PROCEDURE Route: IV  Indications of Use: PERIOPERATIVE PHARMACOPROPHYLAXIS  Start: 01/31/18 1328   End: 01/31/18 1330   Admin Instructions: Give first dose within 1 hour PRIOR to incision. If patient weight is greater than or equal to 120 kg increase dose to 3 g.        1330-Med Discontinued            Dose: 2 g  Freq: PRE-OP/PRE-PROCEDURE Route: IV  Indications of Use: PERIOPERATIVE PHARMACOPROPHYLAXIS  Start: 01/31/18 1328   End: 01/31/18 1358   Admin Instructions: Give first dose within 1 hour PRIOR to incision. If patient weight is greater than or equal to 120 kg increase dose to 3 g.        1358-Med Discontinued            Dose: 25-50 mcg  Freq: EVERY 2 MIN PRN Route: IV  PRN Reason: other  PRN Comment: acute pain  Start: 01/31/18 1641   End: 01/31/18 1732   Admin Instructions: MAX cumulative dose = 250 mcg.    Use Fentanyl initially, as a short acting agent for acute pain control.  If insufficient, or a longer acting agent is needed, begin Morphine or Hydromorphone if ordered.  For ordered doses up to 100 mcg give IV Push undiluted over a minimum of 3-5 minutes.        1732-Med Discontinued            Dose: 100 mg  Freq: PRE-OP/PRE-PROCEDURE Route: PO  Start: 01/31/18 1328   End: 01/31/18 1358   Admin  Instructions: For pain with neuropathic features        1358-Med Discontinued            Dose: 100 mg  Freq: PRE-OP/PRE-PROCEDURE Route: PO  Start: 01/31/18 1328   End: 01/31/18 1358   Admin Instructions: For pain with neuropathic features        1358-Med Discontinued            Dose: 2.5-5 mg  Freq: EVERY 10 MIN PRN Route: IV  PRN Reason: high blood pressure  PRN Comment: for Systolic Blood Pressure greater than 160 and Heart Rate greater than 60 bpm.  Start: 01/31/18 1641   End: 01/31/18 1732   Admin Instructions: Max cumulative dose = 20 mg.  FOR USE IN PACU ONLY, DC WHEN TRANSFERRED TO FLOOR.  For ordered doses up to 40 mg, give IV Push undiluted over 1 minute.        1732-Med Discontinued            Dose: 0.3-0.5 mg  Freq: EVERY 5 MIN PRN Route: IV  PRN Reason: other  PRN Comment: acute pain.  May administer if Respiratory Rate is greater than 10  Start: 01/31/18 1641   End: 01/31/18 1732   Admin Instructions: If fentanyl is also ordered, use HYDROmorphone if pain control insufficient with fentanyl or a longer acting agent is needed.   Max cumulative dose = 2 mg  For ordered doses up to 4 mg give IV Push undiluted. Administer each 2mg over 2-5 minutes.        1732-Med Discontinued            Rate: 100 mL/hr   Freq: CONTINUOUS Route: IV  Start: 01/31/18 1645   End: 01/31/18 1732   Admin Instructions: Continue until IV catheter is weaned               1732-Med Discontinued            Rate: 10 mL/hr   Freq: CONTINUOUS Route: IV  Start: 01/31/18 1415   End: 01/31/18 1640       1419 ( )-New Bag       1535 ( )-New Bag       1622 ( )-Anesthesia Volume Adjustment       1640-Med Discontinued            Rate: 10 mL/hr   Freq: CONTINUOUS Route: IV  Start: 01/31/18 1330   End: 01/31/18 1358              1358-Med Discontinued            Freq: EVERY 1 HOUR PRN Route: Top  PRN Reason: pain  PRN Comment: with VAD insertion or accessing implanted port.  Start: 01/31/18 1407   End: 01/31/18 1640   Admin Instructions: Do NOT give  "if patient has a history of allergy to any local anesthetic or any \"mercedes\" product.   Apply 30 minutes prior to VAD insertion or port access.  MAX Dose:  2.5 g (  of 5 g tube)        1640-Med Discontinued            Freq: EVERY 1 HOUR PRN Route: Top  PRN Reason: pain  PRN Comment: with VAD insertion or accessing implanted port.  Start: 01/31/18 1327   End: 01/31/18 1358   Admin Instructions: Do NOT give if patient has a history of allergy to any local anesthetic or any \"mercedes\" product.   Apply 30 minutes prior to VAD insertion or port access.  MAX Dose:  2.5 g (  of 5 g tube)        1358-Med Discontinued            Dose: 1 mL  Freq: EVERY 1 HOUR PRN Route: OTHER  PRN Comment: mild pain with VAD insertion or accessing implanted port  Start: 01/31/18 1407   End: 01/31/18 1640   Admin Instructions: Do NOT give if patient has a history of allergy to any local anesthetic or any \"mercedes\" product. MAX dose 1 mL subcutaneous OR intradermal in divided doses.        1419 (1 mL)-Given       1432 (5 mL)-Given       1640-Med Discontinued            Dose: 1 mL  Freq: EVERY 1 HOUR PRN Route: OTHER  PRN Comment: mild pain with VAD insertion or accessing implanted port  Start: 01/31/18 1327   End: 01/31/18 1358   Admin Instructions: Do NOT give if patient has a history of allergy to any local anesthetic or any \"mercedes\" product. MAX dose 1 mL subcutaneous OR intradermal in divided doses.        1358-Med Discontinued            Dose: 12.5 mg  Freq: EVERY 5 MIN PRN Route: IV  PRN Comment: post anesthesia shivering if Respiratory Rate greater than 10  Start: 01/31/18 1641   End: 01/31/18 1732   Admin Instructions: Give IV Push undiluted. 10-40 mg over 2-3 minutes, up to 125 mg over 3-15 minutes        1732-Med Discontinued            Dose: 0.5-1 mg  Freq: EVERY 5 MIN PRN Route: IV  PRN Reason: muscle spasms  Start: 01/31/18 1641   End: 01/31/18 1732   Admin Instructions: Max cumulative dose = 2 mg  For ordered doses up to 2.5 mg give IV " Push slowly titrated over a minimum of 2 minutes.  Dilute each 1mg in 4mL of NS.        1732-Med Discontinued            Dose: 0.1-0.4 mg  Freq: EVERY 2 MIN PRN Route: IV  PRN Reason: opioid reversal  Start: 01/31/18 1641   End: 01/31/18 1748   Admin Instructions: For apnea or imminent respiratory arrest: give 0.4 mg IV undiluted Q 2 minutes PRN until desired degree of reversal is obtained, stop opioid and notify provider. Continue monitoring until discharge criteria are met for a minimum of 2 hours.  For severe sedation, decrease in respiratory depth, quality or respiratory rate less than 8: give 0.1 mg IV Q 2 minutes x 3 doses, stop opioid and notify provider.  Try to minimize reversal of analgesia especially in end-of-life patients  For ordered doses up to 2mg give IVP. Give each 0.4mg over 15 seconds in emergency situations. For non-emergent situations further dilute in 9mL of NS to facilitate titration of response.        1748-Med Discontinued            Dose: 4 mg  Freq: EVERY 30 MIN PRN Route: PO  PRN Reason: nausea  Start: 01/31/18 1641   End: 01/31/18 1732   Admin Instructions: MAX total dose = 8 mg, including OR dosing. If not resolved in 15 minutes, then go to step 2 [prochlorperazine (COMPAZINE), if ordered].  With dry hands, peel back foil backing and gently remove tablet; do not push oral disintegrating tablet through foil backing; administer immediately on tongue and oral disintegrating tablet dissolves in seconds; then swallow with saliva; liquid not required.        1732-Med Discontinued         Or    Dose: 4 mg  Freq: EVERY 30 MIN PRN Route: IV  PRN Reason: nausea  Start: 01/31/18 1641   End: 01/31/18 1732   Admin Instructions: MAX total dose = 8 mg, including OR dosing. If not resolved in 15 minutes, then go to step 2 [prochlorperazine (COMPAZINE), if ordered].  Irritant. For ordered doses up to 4 mg, give IV Push undiluted over 2-5 minutes.        1732-Med Discontinued            Dose: 5  mg  Freq: EVERY 3 HOURS PRN Route: PO  PRN Reason: moderate to severe pain  Start: 01/31/18 1748   End: 02/01/18 1211   Admin Instructions: IF CrCl is UNKNOWN start at lowest end of dosing range.  Hold while on PCA or with regular IV opioid dosing.         1211-Med Discontinued           Dose: 5 mg  Freq: EVERY 6 HOURS PRN Route: IV  PRN Reasons: nausea,vomiting  Start: 01/31/18 1641   End: 01/31/18 1732   Admin Instructions: This is Step 2 of the nausea and vomiting protocol.   If nausea not resolved in 15 minutes, give metoclopramide (REGLAN) if ordered (step 3 of nausea and vomiting protocol)  For ordered doses up to 10 mg, give IV Push undiluted. Each 5mg over 1 minute.        1732-Med Discontinued            Freq: PRN  Start: 01/31/18 1604   End: 01/31/18 1732       1604 ( )-Given [C]       1732-Med Discontinued            Dose: 3 mL  Freq: EVERY 8 HOURS Route: IK  Start: 01/31/18 1415   End: 01/31/18 1640   Admin Instructions: And Q1H PRN, to lock peripheral IV dormant line.               1640-Med Discontinued            Dose: 3 mL  Freq: EVERY 1 HOUR PRN Route: IK  PRN Reason: line flush  PRN Comment: for peripheral IV flush post IV meds  Start: 01/31/18 1407   End: 01/31/18 1640       1640-Med Discontinued            Dose: 3 mL  Freq: EVERY 8 HOURS Route: IK  Start: 01/31/18 1328   End: 01/31/18 1358   Admin Instructions: And Q1H PRN, to lock peripheral IV dormant line.               1358-Med Discontinued            Dose: 3 mL  Freq: EVERY 1 HOUR PRN Route: IK  PRN Reason: line flush  PRN Comment: for peripheral IV flush post IV meds  Start: 01/31/18 1328   End: 01/31/18 1358       1358-Med Discontinued            Dose: 1 g  Freq: ONCE Route: IV  Start: 01/31/18 1330   End: 01/31/18 1358   Admin Instructions: Each 1 gram to be infused over 10 minutes.               1358-Med Discontinued       Medications 01/28/18 01/29/18 01/30/18 01/31/18 02/01/18 02/02/18 02/03/18

## 2018-01-31 NOTE — IP AVS SNAPSHOT
"` `           [facility] SURGICAL: [phone]                                              INTERAGENCY TRANSFER FORM - NURSING   2018                    Hospital Admission Date: 2018  TIFFANY RAMEY   : 1935  Sex: Female        Attending Provider: Sen Parry MD     Allergies:  Sulfa Drugs    Infection:  None   Service:  SURGERY    Ht:  1.6 m (5' 3\")   Wt:  104.8 kg (231 lb)   Admission Wt:  --    BMI:  40.92 kg/m 2   BSA:  2.16 m 2            Patient PCP Information     Provider PCP Type    Joy Kam, ROSA CNP General      Current Code Status     Date Active Code Status Order ID Comments User Context       2018  5:48 PM Full Code [id number]  Sen Parry MD Inpatient       Code Status History     Date Active Date Inactive Code Status Order ID Comments User Context    2016 10:44 AM 2018  5:48 PM Full Code [id number]  Demetrius Kiser MD Outpatient    2016  8:09 PM 2016 10:44 AM Full Code [id number]  Sen Parry MD Inpatient    2014  8:14 PM 2014  4:32 PM Full Code [id number]  Warner Reid MD Inpatient    2012 11:29 AM 2014  8:14 PM Full Code [id number]  Melissa Holt, RN Outpatient    2012  2:13 PM 2012 11:29 AM Full Code [id number]  Alexandria Arevalo, RN Inpatient    2012  8:17 PM 2012  7:53 PM Full Code [id number]  Joellen Barron, RN Inpatient    2012  1:52 PM 2012  8:17 PM DNR/DNI [id number]  Kristofer Bella S Outpatient    2011  8:43 PM 2011  4:47 PM Full Code [id number]  Schirmers, Mary Inpatient      Advance Directives        Scanned docmt in ACP Activity?           Yes, scanned ACP docmt        Hospital Problems as of 2/3/2018              Priority Class Noted POA    Idiopathic chronic gout of multiple sites without tophus Medium  6/3/2007 Yes    CKD (chronic kidney disease) stage 3, GFR 30-59 ml/min Medium  3/30/2011 Yes    Acquired hypothyroidism " Medium  9/12/2012 Yes    Coronary artery disease involving native coronary artery of native heart without angina pectoris Medium  6/18/2014 Yes    * (Principal)Status post total replacement of right hip Medium  1/31/2018 Yes    Degenerative joint disease (DJD) of hip Medium  1/31/2018 Yes    Postoperative anemia Medium  2/2/2018 No      Non-Hospital Problems as of 2/3/2018              Priority Class Noted    Hemangioma Medium  5/17/2005    Benign essential hypertension Medium  5/17/2005    Gastroesophageal reflux disease without esophagitis Low  5/17/2005    surgical menopause (SARAH BSO) for non-cancerous reasons Medium  1/8/2007    Morbid obesity due to excess calories (H) Low  1/8/2007    Chronic airway obstruction (H) Low  1/13/2008    Hyperlipidemia LDL goal <100 Low  10/31/2010    Advanced directives, counseling/discussion Medium  4/12/2012    DJD of shoulder Medium  9/26/2012    Primary localized osteoarthrosis, lower leg Medium  12/26/2012    Generalized anxiety disorder Medium  9/8/2013    Health Care Home Medium  4/17/2014    FH: rheumatoid arthritis Medium  2/12/2015    Osteopenia Medium  3/3/2015      Immunizations     Name Date      Influenza (High Dose) 3 valent vaccine 12/17/15     Influenza (High Dose) 3 valent vaccine 10/14/14     Influenza (IIV3) PF 01/08/07     Influenza (IIV3) PF 11/02/05     Pneumo Conj 13-V (2010&after) 07/26/16     Pneumococcal 23 valent 01/08/07     TD (ADULT, 7+) 12/29/08     Zoster vaccine, live 08/07/12          END      ASSESSMENT     Discharge Profile Flowsheet     EXPECTED DISCHARGE     Other Resources  Transportation Services 02/02/18 1347    Expected Discharge Date  02/03/18 02/01/18 1153   Transportation Agency  -- ([employer]) 02/02/18 1347    DISCHARGE NEEDS ASSESSMENT     Transitional Care  -- ([place]) 02/02/18 1347    Patient/family verbalizes understanding of discharge plan recommendations?  Yes 02/01/18 1153   PAS Number  [id number] 02/02/18 1347     "Equipment Currently Used at Home  grab bar;shower chair;walker, rolling 02/02/18 1347   SKIN      Transportation Available  public transportation 02/01/18 1457   Inspection of bony prominences  Full 02/03/18 0744    # of Referrals Placed by CTS  Transportation 05/07/16 1121   Full except areas not inspected   Spine;Hip, left;Hip, right;Buttock, left;Buttock, right;Sacrum;Coccyx 02/01/18 0137    Does Patient Need a Referral for Clinic CC  Yes 05/07/16 1119   Procedural focused assessment (identify areas inspected)   Nose;Cheek, left;Cheek, right;Ear, left;Ear, right;Elbow, left;Elbow, right;Hip, left;Hip, right;Buttock, left;Buttock, right;Knee, left;Knee, right;Ankle, left;Ankle, right;Heel, left;Heel, right;Foot, left;Foot, right 01/31/18 1437    Equipment Used at Home  walker, rolling 04/16/14 0953   Skin WDL  ex 02/03/18 0744    GASTROINTESTINAL (ADULT,PEDIATRIC,OB)     Skin Color/Characteristics  bruised (ecchymotic) 02/03/18 0744    GI WDL  WDL 02/03/18 0744   Skin Temperature  warm 02/02/18 1818    Last Bowel Movement  02/03/18 02/03/18 0744   Skin Moisture  dry 02/02/18 1818    Passing flatus  yes 02/03/18 0744   Skin Integrity  bruise(s);incision(s) 02/03/18 0744    COMMUNICATION ASSESSMENT     Inspection under devices  Full 02/03/18 0744    Patient's communication style  spoken language (English or Bilingual) 10/27/17 0937   SAFETY      FINAL RESOURCES     Safety WDL  WDL 02/03/18 0744    Resources List  Transitional Care 02/02/18 1347   All Alarms  alarm(s) activated and audible 02/03/18 0744                 Assessment WDL (Within Defined Limits) Definitions           Safety WDL     Effective: 09/28/15    Row Information: <b>WDL Definition:</b> Bed in low position, wheels locked; call light in reach; upper side rails up x 2; ID band on<br> <font color=\"gray\"><i>Item=AS safety wdl>>List=AS safety wdl>>Version=F14</i></font>      Skin WDL     Effective: 09/28/15    Row Information: <b>WDL Definition:</b> " "Warm; dry; intact; elastic; without discoloration; pressure points without redness<br> <font color=\"gray\"><i>Item=AS skin wdl>>List=AS skin wdl>>Version=F14</i></font>      Vitals     Vital Signs Flowsheet     VITAL SIGNS     Pain Intervention(s)  Medication (See eMAR) 02/02/18 2210    Temp  97.5  F (36.4  C) 02/03/18 0725   Response to Interventions  Decrease in pain 02/03/18 0735    Temp src  Oral 02/03/18 0725   CLINICALLY ALIGNED PAIN ASSESSMENT (CAPA) ([facility] ADULTS ONLY)      Resp  18 02/03/18 0725   Comfort  comfortably manageable 02/03/18 0735    Pulse  70 02/03/18 0725   Change in Pain  about the same 02/03/18 0735    Pulse/Heart Rate Source  Monitor 02/03/18 0725   Pain Control  partially effective 02/03/18 0735    BP  106/42 02/03/18 0725   Functioning  can do most things, but pain gets in the way of some 02/03/18 0735    BP Location  Right arm 02/03/18 0725   Sleep  normal sleep 02/03/18 0735    Patient Position  Lying 01/31/18 1720   ANALGESIA SIDE EFFECTS MONITORING      OXYGEN THERAPY     Side Effects Monitoring: Respiratory Quality  R 02/03/18 0735    SpO2  97 % 02/03/18 0725   Side Effects Monitoring: Respiratory Depth  N 02/03/18 0735    O2 Device  None (Room air) 02/03/18 0725   Side Effects Monitoring: Sedation Level  1 02/03/18 0735    Oxygen Delivery  1 LPM 02/01/18 0459   POSITIONING      RESPIRATORY MONITORING     Body Position  independently positioning 02/03/18 0744    Respiratory Monitoring (EtCO2)  36 mmHg 02/01/18 0637   Head of Bed (HOB)  HOB at 15 degrees 02/02/18 1248    Integrated Pulmonary Index (IPI)  10 02/01/18 0637   Positioning/Transfer Devices  aBduction splint/pillow 02/03/18 0540    PAIN/COMFORT     Chair  Upright in chair 02/03/18 0744    Patient Currently in Pain  yes 02/03/18 0735   ECG      Preferred Pain Scale  CAPA (Clinically Aligned Pain Assessment) ([facility] Adults Only) 02/03/18 0735   ECG Rhythm  Normal sinus rhythm 01/31/18 " 1720    0-10 Pain Scale  5 02/01/18 0245   DAILY CARE      Pain Location  Hip 02/03/18 0735   Activity Management  activity adjusted per tolerance 02/03/18 0744    Pain Orientation  Right 02/03/18 0735   Activity Assistance Provided  assistance, stand-by 02/03/18 0540    Pain Descriptors  Aching 02/03/18 0735   Assistive Device Utilized  walker 02/03/18 0540    Pain Management Interventions  analgesia administered 02/02/18 2210                 Patient Lines/Drains/Airways Status    Active LINES/DRAINS/AIRWAYS     Name: Placement date: Placement time: Site: Days: Last dressing change:    Peripheral IV 10/27/17 Right Hand 10/27/17   1034   Hand   99     Incision/Surgical Site 01/31/18 Right Hip 01/31/18   1513    2             Patient Lines/Drains/Airways Status    Active PICC/CVC     None            Intake/Output Detail Report     Date Intake     Output     Net    Shift P.O. I.V. IV Piggyback Total Urine Drains Blood Total       Noc 02/01/18 2300 - 02/02/18 0659 -- -- -- -- -- -- -- -- 0    Day 02/02/18 0700 - 02/02/18 1459 810 -- -- 810 -- -- -- -- 810    Lorena 02/02/18 1500 - 02/02/18 2259 440 -- -- 440 -- -- -- -- 440    Noc 02/02/18 2300 - 02/03/18 0659 -- 5 -- 5 -- -- -- -- 5    Day 02/03/18 0700 - 02/03/18 1459 -- -- -- -- -- -- -- -- 0      Last Void/BM       Most Recent Value    Urine Occurrence 1 at 02/03/2018 0720    Stool Occurrence 1 at 02/03/2018 0720      Case Management/Discharge Planning     Case Management/Discharge Planning Flowsheet     REFERRAL INFORMATION     Patient/family verbalizes understanding of discharge plan recommendations?  Yes 02/01/18 1153    Did the Initial Social Work Assessment result in a Social Work Case?  No 02/01/18 1150   Transportation Available  public transportation 02/01/18 1457    Arrived From  post anesthesia care unit 04/13/11 2054   Does Patient Need a Referral for Clinic CC  Yes 05/07/16 1119    # of Referrals Placed by CTS  Transportation 05/07/16 1121   Skilled  Nursing Facility  [facility] 09/28/12 1451    Reason For Consult  discharge planning 02/01/18 1150   Equipment Used at Home  walker, rolling 04/16/14 0953    Primary Care Clinic Name  [facility] 02/01/18 1153   FINAL RESOURCES      Primary Care MD Name  Joy Kam 02/01/18 1153   Equipment Currently Used at Home  grab bar;shower chair;walker, rolling 02/02/18 1347    LIVING ENVIRONMENT     Resources List  Transitional Care 02/02/18 1347    Lives With  alone 02/01/18 1457   Other Resources  Transportation Services 02/02/18 1347    Living Arrangements  apartment 02/01/18 1457   Transportation Agency  -- ([employer]) 02/02/18 1347    ASSESSMENT OF FAMILY/SOCIAL SUPPORT     Transitional Care  -- ([facility]) 02/02/18 1347    Who is your support system?  Neighbor 02/01/18 1153   PAS Number  [id number] 02/02/18 1347    Description of Support System  Supportive 02/01/18 1153   ABUSE RISK SCREEN      Support Assessment  Lacks necessary supervision and assistance 02/01/18 1153   QUESTION TO PATIENT:  Has a member of your family or a partner(now or in the past) intimidated, hurt, manipulated, or controlled you in any way?  no 01/31/18 1355    COPING/STRESS     QUESTION TO PATIENT: Do you feel safe going back to the place where you are living?  yes 01/31/18 1355    Major Change/Loss/Stressor  none 01/26/18 1208   OBSERVATION: Is there reason to believe there has been maltreatment of a vulnerable adult (ie. Physical/Sexual/Emotional abuse, self neglect, lack of adequate food, shelter, medical care, or financial exploitation)?  no 01/31/18 1355    EXPECTED DISCHARGE     OTHER      Expected Discharge Date  02/03/18 02/01/18 1153   Are you depressed or being treated for depression?  No 01/31/18 1754    DISCHARGE PLANNING

## 2018-01-31 NOTE — ANESTHESIA PROCEDURE NOTES
Peripheral nerve/Neuraxial procedure note : intrathecal  Pre-Procedure  Performed by  HARMAN MOODY   Location: OR    Procedure Times:1/31/2018 2:30 PM and 1/31/2018 2:40 PM  Pre-Anesthestic Checklist: patient identified, IV checked, site marked, risks and benefits discussed, informed consent, monitors and equipment checked, pre-op evaluation, at physician/surgeon's request and post-op pain management    Timeout  Correct Patient: Yes   Correct Procedure: Yes   Correct Site: Yes   Correct Laterality: Yes   Correct Position: Yes   Site Marked: Yes   .   Procedure Documentation  ASA 3  .    Procedure:    Intrathecal.  Insertion Site:L3-4  (midline approach)      Patient Prep;mask, povidone-iodine 7.5% surgical scrub, patient draped.  .  Needle: Evonne tip Spinal Needle (gauge): 25  Introducer used .       Assessment/Narrative  Paresthesias: Yes.  .  .  clear CSF fluid removed . Time Injected: 14:45  Sensory Level: T8

## 2018-01-31 NOTE — H&P (VIEW-ONLY)
[facility]  [address] [place] [address]  [phone]  Dept: [phone]    PRE-OP EVALUATION:  Today's date: 2018    Elly Choe (: 1935) presents for pre-operative evaluation assessment as requested by Dr. Parry.  She requires evaluation and anesthesia risk assessment prior to undergoing surgery/procedure for treatment of right hip pain .  Proposed procedure: Right total hip arthroplasty.     Date of Surgery/ Procedure: 2018  Time of Surgery/ Procedure: 3:00  Hospital/Surgical Facility: [facility]  Primary Physician: Joy Kam  Type of Anesthesia Anticipated: Choice    Patient has a Health Care Directive or Living Will:  YES     Preop Questions 2018   1.  Do you have a history of heart attack, stroke, stent, bypass or surgery on an artery in the head, neck, heart or legs? YES-stent    2.  Do you ever have any pain or discomfort in your chest? No   3.  Do you have a history of  Heart Failure? No   4.   Are you troubled by shortness of breath when:  walking on a level surface, or up a slight hill, or at night? No   5.  Do you currently have a cold, bronchitis or other respiratory infection? No   6.  Do you have a cough, shortness of breath, or wheezing? No   7.  Do you sometimes get pains in the calves of your legs when you walk? No   8. Do you or anyone in your family have previous history of blood clots? No   9.  Do you or does anyone in your family have a serious bleeding problem such as prolonged bleeding following surgeries or cuts? No   10. Have you ever had problems with anemia or been told to take iron pills? No   11. Have you had any abnormal blood loss such as black, tarry or bloody stools, or abnormal vaginal bleeding? No   12. Have you ever had a blood transfusion? No   13. Have you or any of your relatives ever had problems with anesthesia? No   14. Do you have sleep apnea, excessive snoring or daytime drowsiness? No    15. Do you have any prosthetic heart valves? No   16. Do you have prosthetic joints? YES - knees and shoulder   17. Is there any chance that you may be pregnant? No         HPI:                                                      Brief HPI related to upcoming procedure: ongoing right hip pain with osteoarthritis present in need of right total hip arthroplasty      See problem list for active medical problems.  Problems all longstanding and stable, except as noted/documented.  See ROS for pertinent symptoms related to these conditions.                                                                                                  .    MEDICAL HISTORY:                                                      Patient Active Problem List    Diagnosis Date Noted     Status post total shoulder arthroplasty, left 05/04/2016     Priority: Medium     Osteopenia 03/03/2015     Priority: Medium     Feb 2015 dexa with mostly mild osteopenia.  Repeat in 3-5 yrs.       FH: rheumatoid arthritis 02/12/2015     Priority: Medium     Negative RF, DEVON, CCP 3/26/13  Never tested, but mother had RA and Elly has classic hand deformities.  She does not want to hear about it.       Coronary artery disease involving native coronary artery of native heart without angina pectoris 06/18/2014     Priority: Medium     4/15/2014  Cardiac catheterization-2 drug eluting stents placed in left main artery, moderated disease in LAD and circumflex, mild RCA, normal EF  On Brilinta for one year (dcd 4/28/15)           S/P PTCA (percutaneous transluminal coronary angioplasty) 06/18/2014     Priority: Medium     Health Care Home 04/17/2014     Priority: Medium       Status:  No Services Needed  Care Coordinator:  Yaa Nelson    See Letters for [facility] Care Plan  Date:  February 9, 2016               Generalized anxiety disorder 09/08/2013     Priority: Medium     Diagnosis updated by automated process. Provider to review and confirm.       S/P knee  replacement left 01/02/2013     Priority: Medium     Primary localized osteoarthrosis, lower leg 12/26/2012     Priority: Medium     Anemia 12/06/2012     Priority: Medium     Status post shoulder joint replacement 10/05/2012     Priority: Medium     Phlebitis and thrombophlebitis of superficial vessels of lower extremities 10/02/2012     Priority: Medium     DJD of shoulder 09/26/2012     Priority: Medium     Acquired hypothyroidism 09/12/2012     Priority: Medium     Advanced directives, counseling/discussion 04/12/2012     Priority: Medium     Advance Directive received and scanned. Click on Code in the patient header to view. 4/12/2012     DNI/DNR         CKD (chronic kidney disease) stage 3, GFR 30-59 ml/min 03/30/2011     Priority: Medium     Acute dermatitis due to solar radiation 09/09/2010     Priority: Medium     Idiopathic chronic gout of multiple sites without tophus 06/03/2007     Priority: Medium     Josselin 3, 2007: right great toe with elevated uric acid, started on -ALLOPURINOL 100 MG daily. recheck uric acid 2 week after starting medication and titate as needed.  Problem list name updated by automated process. Provider to review and confirm  Imo Update utility       surgical menopause (SARAH BSO) for non-cancerous reasons 01/08/2007     Priority: Medium     Age 41       Hemangioma 05/17/2005     Priority: Medium     Area on chest subcutaneous between breast tissue  Problem list name updated by automated process. Provider to review       Benign essential hypertension 05/17/2005     Priority: Medium     Diverticulitis of colon 05/17/2005     Priority: Medium     Problem list name updated by automated process. Provider to review       Hyperlipidemia LDL goal <100 10/31/2010     Priority: Low     Chronic airway obstruction (H) 01/13/2008     Priority: Low     9/22/2010:She has been told there is lung damage from smoking over the years but has not been limited by any respiratory symptoms.  Not using any  inhalers.   Problem list name updated by automated process. Provider to review       Morbid obesity due to excess calories (H) 01/08/2007     Priority: Low     Problem list name updated by automated process. Provider to review       Gastroesophageal reflux disease without esophagitis 05/17/2005     Priority: Low      Past Medical History:   Diagnosis Date     ACS (acute coronary syndrome) (H) 4/14/2014     Anemia 12/6/2012     Arthritis      Chronic kidney disease, stage III (moderate) 10/2/2012     CKD (chronic kidney disease) stage 3, GFR 30-59 ml/min 3/30/2011     COPD (chronic obstructive pulmonary disease) (H)      Generalised anxiety disorder 9/8/2013     GERD 5/17/2005     HX OF COMPOUND HEMANGIOMA NOS [228.00]      HX OF SUPERFICIAL PHLEBITIS      Hyperlipidemia LDL goal <100 10/31/2010     Hypertension      Hypertension goal BP (blood pressure) < 140/90 5/17/2005     Hypothyroidism 9/12/2012     Obesity, unspecified 1/8/2007     Phlebitis and thrombophlebitis of superficial vessels of lower extremities 10/2/2012     Primary localized osteoarthrosis, lower leg 12/26/2012     S/P knee replacement left 1/2/2013     Thyroid disease      Unspecified hypothyroidism 10/2/2012     Past Surgical History:   Procedure Laterality Date     ARTHROPLASTY KNEE  4/13/2011    Procedure:ARTHROPLASTY KNEE; Surgeon:SEN PARRY; Location:[facility]     ARTHROPLASTY KNEE  12/26/2012    Procedure: ARTHROPLASTY KNEE;  Left Total Knee Arthroplasty;  Surgeon: Sen Parry MD;  Location: [facility]     ARTHROPLASTY SHOULDER  9/26/2012    Procedure: ARTHROPLASTY SHOULDER;  Right total shoulder arthroplasty;  Surgeon: Sen Parry MD;  Location: [facility]     ARTHROPLASTY SHOULDER Left 5/4/2016    Procedure: ARTHROPLASTY SHOULDER;  Surgeon: Sen Parry MD;  Location: [facility]     ARTHROSCOPY KNEE IRRIGATION AND DEBRIDEMENT  12/10/2010    ARTHROSCOPY KNEE IRRIGATION AND DEBRIDEMENT performed by LEY, JEFFREY DUANE at [facility]      CATARACT IOL, RT/LT  4/2010    bilateral     COLONOSCOPY  2002     COLONOSCOPY  6/19/2014    Procedure: COLONOSCOPY;  Surgeon: Steve Deng MD;  Location: [facility]     CORONARY ANGIOGRAPHY ADULT ORDER       HYSTERECTOMY, SARAH  1976    Total abdominal hysterectomy & bilateral salpingectomy oophorectomy     TONSILLECTOMY & ADENOIDECTOMY      as child     Current Outpatient Prescriptions   Medication Sig Dispense Refill     allopurinol (ZYLOPRIM) 100 MG tablet TAKE ONE TABLET BY MOUTH EVERY DAY 90 tablet 3     Acetaminophen (TYLENOL PO) Take 325 mg by mouth every 4 hours as needed for mild pain or fever       GLUCOSAMINE-CHONDROITIN PO Take 1 tablet by mouth 2 times daily       levothyroxine (SYNTHROID/LEVOTHROID) 75 MCG tablet Take 1 tablet (75 mcg) by mouth daily 90 tablet 3     multivitamin, therapeutic with minerals (MULTI-VITAMIN) TABS Take 1 tablet by mouth daily 100 tablet 3     aspirin EC 81 MG EC tablet Take 1 tablet (81 mg) by mouth daily       OTC products: None, except as noted above    Allergies   Allergen Reactions     Sulfa Drugs Other (See Comments)     Causes burning when urinates. Causes yeast infections.     Percocet [Oxycodone-Acetaminophen] Other (See Comments)     Freaked out and broke a toilet at [facility]      Latex Allergy: NO    Social History   Substance Use Topics     Smoking status: Former Smoker     Quit date: 1/1/1970     Smokeless tobacco: Never Used      Comment: 20 year hx of smoking 3 packs of cigarettes daily; quit 1970     Alcohol use No     History   Drug Use No       REVIEW OF SYSTEMS:                                                    C: NEGATIVE for fever, chills, change in weight  I: NEGATIVE for worrisome rashes, moles or lesions  E: NEGATIVE for vision changes or irritation  E/M: NEGATIVE for ear, mouth and throat problems  R: NEGATIVE for significant cough or SOB  B: NEGATIVE for masses, tenderness or discharge  CV: NEGATIVE for chest pain, palpitations or peripheral  "edema  GI: NEGATIVE for nausea, abdominal pain, heartburn, or change in bowel habits  : NEGATIVE for frequency, dysuria, or hematuria  MUSCULOSKELETAL:POSITIVE  for joint pain right hip  N: NEGATIVE for weakness, dizziness or paresthesias  E: NEGATIVE for temperature intolerance, skin/hair changes  H: NEGATIVE for bleeding problems  P: NEGATIVE for changes in mood or affect    EXAM:                                                    /72 (Cuff Size: Adult Large)  Pulse 72  Temp 97.6  F (36.4  C) (Tympanic)  Ht 5' 3\" (1.6 m)  Wt 231 lb (104.8 kg)  LMP 01/01/1977  SpO2 97%  BMI 40.92 kg/m2    GENERAL APPEARANCE: healthy, alert and no distress     EYES: EOMI, PERRL     HENT: ear canals and TM's normal and nose and mouth without ulcers or lesions     NECK: no adenopathy, no asymmetry, masses, or scars and thyroid normal to palpation     RESP: lungs clear to auscultation - no rales, rhonchi or wheezes     CV: regular rates and rhythm, normal S1 S2, no S3 or S4 and no murmur, click or rub     ABDOMEN:  soft, nontender, no HSM or masses and bowel sounds normal     MS: extremities normal- no gross deformities noted     SKIN: no suspicious lesions or rashes     NEURO: Normal strength and tone, sensory exam grossly normal, mentation intact and speech normal     PSYCH: mentation appears normal. and affect normal/bright    DIAGNOSTICS:                                                    EKG: appears normal, NSR, normal axis, normal intervals, no acute ST/T changes c/w ischemia, no LVH by voltage criteria    Results for orders placed or performed in visit on 01/26/18   CBC with platelets   Result Value Ref Range    WBC 6.4 4.0 - 11.0 10e9/L    RBC Count 4.19 3.8 - 5.2 10e12/L    Hemoglobin 13.5 11.7 - 15.7 g/dL    Hematocrit 40.1 35.0 - 47.0 %    MCV 96 78 - 100 fl    MCH 32.2 26.5 - 33.0 pg    MCHC 33.7 31.5 - 36.5 g/dL    RDW 12.9 10.0 - 15.0 %    Platelet Count 187 150 - 450 10e9/L   Basic metabolic panel "   Result Value Ref Range    Sodium 132 (L) 133 - 144 mmol/L    Potassium 4.4 3.4 - 5.3 mmol/L    Chloride 98 94 - 109 mmol/L    Carbon Dioxide 29 20 - 32 mmol/L    Anion Gap 5 3 - 14 mmol/L    Glucose 96 70 - 99 mg/dL    Urea Nitrogen 18 7 - 30 mg/dL    Creatinine 1.24 (H) 0.52 - 1.04 mg/dL    GFR Estimate 42 (L) >60 mL/min/1.7m2    GFR Estimate If Black 50 (L) >60 mL/min/1.7m2    Calcium 9.0 8.5 - 10.1 mg/dL     IMPRESSION:                                                    Reason for surgery/procedure: ongoing right hip pain with osteoarthritis present in need of right total hip arthroplasty    The proposed surgical procedure is considered INTERMEDIATE risk.    REVISED CARDIAC RISK INDEX  The patient has the following serious cardiovascular risks for perioperative complications such as (MI, PE, VFib and 3  AV Block):  Coronary Artery Disease (MI, positive stress test, angina, Qs on EKG)  INTERPRETATION: 1 risks: Class II (low risk - 0.9% complication rate)    The patient has the following additional risks for perioperative complications:  No identified additional risks      ICD-10-CM    1. Preop general physical exam Z01.818 EKG 12-lead complete w/read - Clinics   2. Primary osteoarthritis of right hip M16.11    3. Coronary artery disease involving native coronary artery of native heart without angina pectoris I25.10 EKG 12-lead complete w/read - Clinics   4. Morbid obesity due to excess calories (H) E66.01    5. Chronic obstructive pulmonary disease, unspecified COPD type (H) J44.9    6. Benign essential hypertension I10    7. CKD (chronic kidney disease) stage 3, GFR 30-59 ml/min N18.3        RECOMMENDATIONS:                                                      --Consult hospital rounder / IM to assist post-op medical management    Cardiovascular Risk  Beta blockers not indicated. No chest pain or shortness of breath with activity, EKG normal.      Pulmonary Risk  Incentive spirometry post op  Respiratory  Therapy (Respiratory Care IP Consult)  post op      --Patient is to take all scheduled medications on the day of surgery EXCEPT for modifications listed below.    Anticoagulant or Antiplatelet Medication Use  ASPIRIN: Discontinue ASA 7-10 days prior to procedure to reduce bleeding risk.  It should be resumed post-operatively.        APPROVAL GIVEN to proceed with proposed procedure, without further diagnostic evaluation       Signed Electronically by: ROSA Patel CNP    Copy of this evaluation report is provided to requesting physician.    [facility] Preop Guidelines

## 2018-01-31 NOTE — ANESTHESIA PREPROCEDURE EVALUATION
Anesthesia Evaluation     . Pt has had prior anesthetic. Type: General and MAC           ROS/MED HX    ENT/Pulmonary:     (+)tobacco use, mild COPD, , . .    Neurologic:  - neg neurologic ROS     Cardiovascular:     (+) Dyslipidemia, hypertension--CAD, --stent,2014  Drug Eluting Stent .. : . . . :. . Previous cardiac testing date:results:date: results:ECG reviewed date:2/17 results:Sinus Rhythm  -With rate variation   cv = 10.  WITHIN NORMAL LIMITS  Cath date: results:  Coronary artery disease involving native coronary artery of native heart without angina pectoris 06/18/2014      Priority: Medium      4/15/2014  Cardiac catheterization-2 drug eluting stents placed in left main artery, moderated disease in LAD and circumflex, mild RCA, normal EF  On Brilinta for one year (dcd 4/28/15)          S/P PTCA (percutaneous transluminal coronary angioplasty) 06/18/2014      Priority: Medium    Health Care Home 04/17/2014      Priority: Medium         Status:  No Services Needed  Care Coordinator:  Yaa Nelson     See Letters for [facility] Care Plan  Date:  February 9, 2016                 METS/Exercise Tolerance:     Hematologic:     (+) Anemia, -      Musculoskeletal:   (+) arthritis, , , -       GI/Hepatic:     (+) GERD       Renal/Genitourinary:     (+) chronic renal disease, type: CRI,       Endo:     (+) thyroid problem hypothyroidism, Obesity, Other Endocrine Disorder gout.      Psychiatric:     (+) psychiatric history anxiety      Infectious Disease:         Malignancy:         Other:                     Physical Exam  Normal systems: cardiovascular, pulmonary and dental    Airway   Mallampati: II  TM distance: >3 FB  Neck ROM: full    Dental     Cardiovascular       Pulmonary                     Anesthesia Plan      History & Physical Review  History and physical reviewed and following examination; no interval change.    ASA Status:  3 .    NPO Status:  > 8 hours    Plan for Spinal   PONV prophylaxis:  Ondansetron  (or other 5HT-3) and Dexamethasone or Solumedrol       Postoperative Care  Postoperative pain management:  IV analgesics and Oral pain medications.      Consents  Anesthetic plan, risks, benefits and alternatives discussed with:  Patient..                          .

## 2018-01-31 NOTE — ANESTHESIA CARE TRANSFER NOTE
Patient: Elly Choe    Procedure(s):  Right total hip arthroplasty.  - Wound Class: I-Clean    Diagnosis: right hip degenerative joint disease  Diagnosis Additional Information: No value filed.    Anesthesia Type:   Spinal     Note:  Airway :Room Air  Patient transferred to:PACU  Comments: Patient's VSS. Spontaneous respirations. Patient awake and oriented. IV patent. Report to SONG Iraheta.Handoff Report: Identifed the Patient, Identified the Reponsible Provider, Reviewed the pertinent medical history, Discussed the surgical course, Reviewed Intra-OP anesthesia mangement and issues during anesthesia, Set expectations for post-procedure period and Allowed opportunity for questions and acknowledgement of understanding      Vitals: (Last set prior to Anesthesia Care Transfer)    CRNA VITALS  1/31/2018 1559 - 1/31/2018 1639      1/31/2018             SpO2: (!)  83 %                Electronically Signed By: ROSA Otoole CRNA  January 31, 2018  4:39 PM

## 2018-01-31 NOTE — IP AVS SNAPSHOT
` ` Patient Information     Patient Name Sex     Elly Choe ([id number]) Female 1935       Room Bed    2307 [id number]      Patient Demographics     Address Phone    [address]   [facility] [address] [phone] (Home)  [phone] (Mobile)      Patient Ethnicity & Race     Ethnic Group Patient Race    American White      Emergency Contact(s)     Name Relation Home Work Mobile    Ana Celaya Friend [phone]        Documents on File        Status Date Received Description       Documents for the Patient    Insurance Card  () 04 health partners    Insurance Card  () 10/19/05 health partners    Face Sheet  () 07     Privacy Notice - [place] Received 12/02/10     Insurance Card  () 08 health partners    Insurance Card  () 08 medicare and health partners    Other  08 MEDICARE    Consent Form  08     Face Sheet Received () 08     External Medication Information Consent Accepted () 09     Insurance Card Received () 03/15/10 medicare    Face Sheet Received () 03/15/10     Patient ID Received 16 [place] CARE    Consent for Services - Hospital/Clinic Received () 11/03/10     External Medication Information Consent Accepted () 11     Consent for Services - Hospital/Clinic Received () 11     Business/Insurance/Care Coordination/Health Form - Patient.2   [facility]    Other Received 11     Consent for Services - Hospital/Clinic Received () 11     Physical Therapy Certification Received () 12 Signed Medicare form sent to [facility] for scanning 12    Physical Therapy Certification Received () 12 Electronically signed by MD    External Medication Information Consent Accepted () 12     Physical Therapy Certification Received () 12 Signed  electronically by MD 12    Insurance Card Received () 12 medica    Insurance Card  ()  medica    Consent for Services - Hospital/Clinic Received () 12     Consent for Services - Hospital/Clinic Received () 13     Advance Directives and Living Will Received 03/22/10     HIM CLEO Authorization  10/22/12     Physical Therapy Certification Received 10/25/12 Medicare cert (10/22/12-12) [facility]    HIM CLEO Authorization  12     Physical Therapy Certification Received 13 Medicare cert (13-3/26/13) Elect signed by Dangelo    Consent for EHR Access  13 Copied from existing Consent for services - C/HOD collected on 2012    [facility] Specified Other       Physical Therapy Certification Received 13 Medicare recert (3/19/13-13) signed electronically by MD    External Medication Information Consent Accepted 13     Physical Therapy Certification Received 13 Medicare cert (13-13) signed electronically by MD    Consent for Services - Hospital/Clinic Received () 14     Consent for Services - Hospital/Clinic Received () 08/31/15     Consent for Services/Privacy Notice - Hospital/Clinic Received () 16     Consent for Services - Geriatrics Received 16     HIM CLEO Authorization  16     Business/Insurance/Care Coordination/Health Form - Patient  16 [employer] - PRESCRIPTION 16    Business/Insurance/Care Coordination/Health Form - Patient  16 MEDICA - CARE TRANSITION - 5/10/16    Business/Insurance/Care Coordination/Health Form - Patient  16 MEDICA CARE COORDINATOR LETTER  2016    Consent for Services/Privacy Notice - Hospital/Clinic Received 17     Insurance Card Received 17     HIM CLEO Authorization  17     Care Everywhere Prospective Auth Received 10/27/17     HIM CLEO Authorization  ()  11/03/17 Authorization for batch 11/03/2017 [facility]    Consent for Services - Hospital/Clinic Received (Deleted) 06/16/15     Consent for Services/Privacy Notice - Hospital/Clinic  (Deleted)      Consent for Services/Privacy Notice - Hospital/Clinic  (Deleted)         Documents for the Encounter    CMS IM for Patient Signature Received 01/31/18     CMS IM for Patient Signature Received 02/02/18       Admission Information     Attending Provider Admitting Provider Admission Type Admission Date/Time    Sen Parry MD Comfort, Thomas K, MD Elective 01/31/18  1307    Discharge Date Hospital Service Auth/Cert Status Service Area     Surgery Incomplete [facility]    Unit Room/Bed Admission Status       [facility] 2306/2306-01 Admission (Confirmed)       Admission     Complaint    right hip degenerative joint disease, Degenerative joint disease (DJD) of hip      Hospital Account     Name Acct ID Class Status Primary Coverage    Elly Choe [id number] Inpatient Open MEDICA - MED[employer] NON/FV PARTNERS            Guarantor Account (for Hospital Account #[id number])     Name Relation to Pt Service Area Active? Acct Type    Elly Choe  FCS Yes Personal/Family    Address Phone          [address] [place] [address] [phone](H)              Coverage Information (for Hospital Account #[id number])     F/O Payor/Plan Precert #    MEDICA/MEDICA DUAL SOLUTIONS [employer] NON/FV PARTNERS     Subscriber Subscriber #    Elly Choe [id number]    Address Phone    [address] [address]  [place] [address] [phone]

## 2018-01-31 NOTE — IP AVS SNAPSHOT
"    [facility]GICAL: [phone]                                              INTERAGENCY TRANSFER FORM - PHYSICIAN ORDERS   2018                    Hospital Admission Date: 2018  TIFFANY RAMEY   : 1935  Sex: Female        Attending Provider: Sen Parry MD     Allergies:  Sulfa Drugs    Infection:  None   Service:  SURGERY    Ht:  1.6 m (5' 3\")   Wt:  104.8 kg (231 lb)   Admission Wt:  --    BMI:  40.92 kg/m 2   BSA:  2.16 m 2            Patient PCP Information     Provider PCP Type    Joy Kam, ROSA CNP General      ED Clinical Impression     Diagnosis Description Comment Added By Time Added    Primary localized osteoarthrosis of right lower leg [M17.11] Primary localized osteoarthrosis of right lower leg [M17.11]  Ralph Ross MD 2/3/2018  8:59 AM      Hospital Problems as of 2/3/2018              Priority Class Noted POA    Idiopathic chronic gout of multiple sites without tophus Medium  6/3/2007 Yes    CKD (chronic kidney disease) stage 3, GFR 30-59 ml/min Medium  3/30/2011 Yes    Acquired hypothyroidism Medium  2012 Yes    Coronary artery disease involving native coronary artery of native heart without angina pectoris Medium  2014 Yes    * (Principal)Status post total replacement of right hip Medium  2018 Yes    Degenerative joint disease (DJD) of hip Medium  2018 Yes    Postoperative anemia Medium  2018 No      Non-Hospital Problems as of 2/3/2018              Priority Class Noted    Hemangioma Medium  2005    Benign essential hypertension Medium  2005    Gastroesophageal reflux disease without esophagitis Low  2005    surgical menopause (SARAH BSO) for non-cancerous reasons Medium  2007    Morbid obesity due to excess calories (H) Low  2007    Chronic airway obstruction (H) Low  2008    Hyperlipidemia LDL goal <100 Low  10/31/2010    Advanced directives, counseling/discussion Medium  " 4/12/2012    DJD of shoulder Medium  9/26/2012    Primary localized osteoarthrosis, lower leg Medium  12/26/2012    Generalized anxiety disorder Medium  9/8/2013    Health Care Home Medium  4/17/2014    FH: rheumatoid arthritis Medium  2/12/2015    Osteopenia Medium  3/3/2015      Code Status History     Date Active Date Inactive Code Status Order ID Comments User Context    5/7/2016 10:44 AM 1/31/2018  5:48 PM Full Code [id number]  Demetrius Kiser MD Outpatient    5/4/2016  8:09 PM 5/7/2016 10:44 AM Full Code [id number]  Sen Parry MD Inpatient    4/14/2014  8:14 PM 4/16/2014  4:32 PM Full Code [id number]  Warner Reid MD Inpatient    12/29/2012 11:29 AM 4/14/2014  8:14 PM Full Code [id number]  Melissa Holt RN Outpatient    12/26/2012  2:13 PM 12/29/2012 11:29 AM Full Code [id number]  Alexandria Arevalo, RN Inpatient    9/26/2012  8:17 PM 9/28/2012  7:53 PM Full Code [id number]  Joellen Barron RN Inpatient    4/12/2012  1:52 PM 9/26/2012  8:17 PM DNR/DNI [id number]  Bella Miner Outpatient    4/13/2011  8:43 PM 4/16/2011  4:47 PM Full Code [id number]  Schirmers, Mary Inpatient         Medication Review      START taking        Dose / Directions Comments    aspirin 325 MG tablet   Used for:  Primary localized osteoarthrosis of right lower leg   Replaces:  aspirin 81 MG EC tablet        Dose:  325 mg   Start taking on:  2/4/2018   Take 1 tablet (325 mg) by mouth daily   Quantity:  40 tablet   Refills:  0        HYDROcodone-acetaminophen 5-325 MG per tablet   Commonly known as:  NORCO   Used for:  Primary localized osteoarthrosis of right lower leg        Dose:  1-2 tablet   Take 1-2 tablets by mouth every 4 hours as needed for moderate to severe pain   Quantity:  60 tablet   Refills:  0        senna-docusate 8.6-50 MG per tablet   Commonly known as:  SENOKOT-S;PERICOLACE   Used for:  Primary localized osteoarthrosis of right lower leg        Dose:  1-2 tablet   Take 1-2 tablets by mouth daily as  needed for constipation   Quantity:  30 tablet   Refills:  0          CONTINUE these medications which have NOT CHANGED        Dose / Directions Comments    allopurinol 100 MG tablet   Commonly known as:  ZYLOPRIM   Used for:  Gouty arthropathy        TAKE ONE TABLET BY MOUTH EVERY DAY   Quantity:  90 tablet   Refills:  3        GLUCOSAMINE-CHONDROITIN PO        Dose:  1 tablet   Take 1 tablet by mouth 2 times daily   Refills:  0        levothyroxine 75 MCG tablet   Commonly known as:  SYNTHROID/LEVOTHROID   Used for:  Acquired hypothyroidism        Dose:  75 mcg   Take 1 tablet (75 mcg) by mouth daily   Quantity:  90 tablet   Refills:  3        multivitamin, therapeutic with minerals Tabs tablet   Used for:  Anemia, unspecified anemia type        Dose:  1 tablet   Take 1 tablet by mouth daily   Quantity:  100 tablet   Refills:  3        TYLENOL PO        Dose:  325 mg   Take 325 mg by mouth every 4 hours as needed for mild pain or fever   Refills:  0          STOP taking     aspirin 81 MG EC tablet   Replaced by:  aspirin 325 MG tablet                     Further instructions from your care team       1.  Follow up with Warner Garcia PA-C.  in 2 weeks for post op check and x rays as scheduled.  Call [phone] if appointment needed or questions  2.  Use pain medication as directed  3.  Keep incision clean, covered and dry until post op appointment.  You may shower and get incision wet if no drainage is present. You may change your dressing as needed.  Only use dry  guaze over Prineo glue tape wound closure and then apply paper tape to hold dressings in place.   4.  Continue physical therapy as soon as possible.  You will need to call a therapy department of your choice to arrange future appointments.  Your order for physical therapy is included in your discharge paperwork.   5.  Take Aspirin 325 mg  daily  for 42 days for anticoagulation        Summary of Visit     Reason for your hospital stay       Right total hip  replacement             After Care     Activity - Up with assistive device           Advance Diet as Tolerated       Follow this diet upon discharge: Regular       General info for SNF       Length of Stay Estimate: Short Term Care: Estimated # of Days <30  Condition at Discharge: Improving  Level of care:skilled   Rehabilitation Potential: Excellent  Admission H&P remains valid and up-to-date: Yes  Recent Chemotherapy: N/A  Use Nursing Home Standing Orders: Yes       Mantoux instructions       Give two-step Mantoux (PPD) Per Facility Policy Yes       Wound care (specify)       Leave dressing intact.  It will be removed at 2 week post op appointment  May shower and get prineo (small dressing over incision) wet, but do not soak for 2 weeks             Referrals     Occupational Therapy Adult Consult       Evaluate and treat as clinically indicated.    Reason:  S/p right total hip arthroplasty       Physical Therapy Adult Consult       Evaluate and treat as clinically indicated.    Reason:  Gait training s/p right total hip arthroplasty             Statement of Approval     Ordered          02/03/18 0905  I have reviewed and agree with all the recommendations and orders detailed in this document.  EFFECTIVE NOW     Approved and electronically signed by:  Ralph Ross MD

## 2018-01-31 NOTE — OR NURSING
Pt is being followed by Del[facility] Care Coordinator.   [phone]  Please contact for plan of care with transitional care.

## 2018-02-01 ENCOUNTER — APPOINTMENT (OUTPATIENT)
Dept: PHYSICAL THERAPY | Facility: CLINIC | Age: 83
DRG: 470 | End: 2018-02-01
Attending: ORTHOPAEDIC SURGERY
Payer: COMMERCIAL

## 2018-02-01 ENCOUNTER — APPOINTMENT (OUTPATIENT)
Dept: OCCUPATIONAL THERAPY | Facility: CLINIC | Age: 83
DRG: 470 | End: 2018-02-01
Attending: ORTHOPAEDIC SURGERY
Payer: COMMERCIAL

## 2018-02-01 LAB
GLUCOSE SERPL-MCNC: 118 MG/DL (ref 70–99)
HGB BLD-MCNC: 11.2 G/DL (ref 11.7–15.7)

## 2018-02-01 PROCEDURE — 12000000 ZZH R&B MED SURG/OB

## 2018-02-01 PROCEDURE — 97535 SELF CARE MNGMENT TRAINING: CPT | Mod: GO | Performed by: OCCUPATIONAL THERAPIST

## 2018-02-01 PROCEDURE — 97530 THERAPEUTIC ACTIVITIES: CPT | Mod: GP | Performed by: PHYSICAL THERAPIST

## 2018-02-01 PROCEDURE — 82947 ASSAY GLUCOSE BLOOD QUANT: CPT | Performed by: ORTHOPAEDIC SURGERY

## 2018-02-01 PROCEDURE — 25000132 ZZH RX MED GY IP 250 OP 250 PS 637: Performed by: PHYSICIAN ASSISTANT

## 2018-02-01 PROCEDURE — 97165 OT EVAL LOW COMPLEX 30 MIN: CPT | Mod: GO | Performed by: OCCUPATIONAL THERAPIST

## 2018-02-01 PROCEDURE — 99231 SBSQ HOSP IP/OBS SF/LOW 25: CPT | Performed by: PHYSICIAN ASSISTANT

## 2018-02-01 PROCEDURE — 25000128 H RX IP 250 OP 636: Performed by: ORTHOPAEDIC SURGERY

## 2018-02-01 PROCEDURE — 85018 HEMOGLOBIN: CPT | Performed by: ORTHOPAEDIC SURGERY

## 2018-02-01 PROCEDURE — 25000132 ZZH RX MED GY IP 250 OP 250 PS 637: Performed by: ORTHOPAEDIC SURGERY

## 2018-02-01 PROCEDURE — 40000193 ZZH STATISTIC PT WARD VISIT: Performed by: PHYSICAL THERAPIST

## 2018-02-01 PROCEDURE — 36415 COLL VENOUS BLD VENIPUNCTURE: CPT | Performed by: ORTHOPAEDIC SURGERY

## 2018-02-01 PROCEDURE — 40000133 ZZH STATISTIC OT WARD VISIT: Performed by: OCCUPATIONAL THERAPIST

## 2018-02-01 PROCEDURE — 97110 THERAPEUTIC EXERCISES: CPT | Mod: GP | Performed by: PHYSICAL THERAPIST

## 2018-02-01 PROCEDURE — 12000007 ZZH R&B INTERMEDIATE

## 2018-02-01 RX ADMIN — MULTIPLE VITAMINS W/ MINERALS TAB 1 TABLET: TAB at 09:17

## 2018-02-01 RX ADMIN — HYDROCODONE BITARTRATE AND ACETAMINOPHEN 1 TABLET: 5; 325 TABLET ORAL at 20:10

## 2018-02-01 RX ADMIN — Medication 0.5 MG: at 02:45

## 2018-02-01 RX ADMIN — ALLOPURINOL 100 MG: 100 TABLET ORAL at 09:17

## 2018-02-01 RX ADMIN — CEFAZOLIN SODIUM 2 G: 2 INJECTION, SOLUTION INTRAVENOUS at 06:14

## 2018-02-01 RX ADMIN — GABAPENTIN 100 MG: 100 CAPSULE ORAL at 09:17

## 2018-02-01 RX ADMIN — HYDROCODONE BITARTRATE AND ACETAMINOPHEN 1 TABLET: 5; 325 TABLET ORAL at 15:02

## 2018-02-01 RX ADMIN — ASPIRIN 325 MG ORAL TABLET 325 MG: 325 PILL ORAL at 09:17

## 2018-02-01 RX ADMIN — SENNOSIDES AND DOCUSATE SODIUM 1 TABLET: 8.6; 5 TABLET ORAL at 09:17

## 2018-02-01 RX ADMIN — ACETAMINOPHEN 975 MG: 325 TABLET, FILM COATED ORAL at 11:38

## 2018-02-01 RX ADMIN — LEVOTHYROXINE SODIUM 75 MCG: 75 TABLET ORAL at 06:13

## 2018-02-01 NOTE — PLAN OF CARE
Problem: Patient Care Overview  Goal: Plan of Care/Patient Progress Review  Outcome: Improving  Patient A&O, but alarmed due to history of trying to transfer independently and recent history of falls at home. Scattered bruising on body. Old hemovac site draining, so new dressing applied to right hip with pressure dressing to old hemovac site. Denying any numbness or tingling. CMS intact. Pain improved following Norco administration. Ice applied to right hip. PCD's on. Encouraged use of IS. Clear lung sounds. Call light in reach. /54 (BP Location: Right arm)  Pulse 79  Temp 98.5  F (36.9  C) (Oral)  Resp 18  LMP 01/14/2013  SpO2 98%

## 2018-02-01 NOTE — PROGRESS NOTES
Elly Mansivinny Choe  1935 02/01/18 1000   Quick Adds   Type of Visit Initial PT Evaluation   Living Environment   Lives With alone   Living Arrangements apartment   Home Accessibility no concerns   Living Environment Comment meals on wheels; house keeping once a week   Functional Level Prior   Ambulation 1-->assistive equipment   Transferring 0-->independent   Toileting 0-->independent   Fall history within last six months yes   Number of times patient has fallen within last six months 1   Which of the above functional risks had a recent onset or change? ambulation;transferring   General Information   Onset of Illness/Injury or Date of Surgery - Date 01/31/18   Referring Physician Comfort   Patient/Family Goals Statement get back to normal   Pertinent History of Current Problem (include personal factors and/or comorbidities that impact the POC) 1/31 R MARY ANNE; PMH:  Chronic kidney disease, CAD, HTN, choic obstructive airway; s/p TSA; s/p L TKA; morbid obesity   Weight-Bearing Status - RLE weight-bearing as tolerated   Cognitive Status Examination   Orientation orientation to person, place and time   Level of Consciousness alert   Personal Safety and Judgment at risk behaviors demonstrated  (moves quickly)   Cognitive Comment question memory/cognition due to 2 episodes in one hour not recalling hemovac and sequence with walking   Pain Assessment   Patient Currently in Pain No   Strength   Strength Comments AG yulissa   Clinical Impression   Criteria for Skilled Therapeutic Intervention yes, treatment indicated   PT Diagnosis s/p MARY ANNE; R hip pain and weakness   Influenced by the following impairments pain; obesity   Functional limitations due to impairments difficulty with ambulation and transfers   Clinical Presentation Stable/Uncomplicated   Clinical Decision Making (Complexity) Low complexity   Therapy Frequency` 2 times/day   Predicted Duration of Therapy Intervention (days/wks) 3   Anticipated Discharge  "Disposition Transitional Care Facility   Risk & Benefits of therapy have been explained Yes   Patient, Family & other staff in agreement with plan of care Yes   Clinical Impression Comments Pt will benefit from skilled intervention to improve functional mobility eventually allowing her to return to living independently   [facility] AM-PAC  \"6 Clicks\" V.2 Basic Mobility Inpatient Short Form   1. Turning from your back to your side while in a flat bed without using bedrails? 3 - A Little   2. Moving from lying on your back to sitting on the side of a flat bed without using bedrails? 3 - A Little   3. Moving to and from a bed to a chair (including a wheelchair)? 3 - A Little   4. Standing up from a chair using your arms (e.g., wheelchair, or bedside chair)? 3 - A Little   5. To walk in hospital room? 3 - A Little   6. Climbing 3-5 steps with a railing? 2 - A Lot   Basic Mobility Raw Score (Score out of 24.Lower scores equate to lower levels of function) 17   Total Evaluation Time   Total Evaluation Time (Minutes) 10     "

## 2018-02-01 NOTE — PROGRESS NOTES
[facility] Medicine Progress Note  Date of Service: 02/01/2018    Assessment & Plan   Elly Choe is a 82 year old female who presented on 1/31/2018 for scheduled Procedure(s):  ARTHROPLASTY HIP by Sen Parry MD and is being followed by the hospital medicine service for co-management of acute and/or chronic perioperative medical problems.      S/p Procedure(s):  ARTHROPLASTY HIP   1 Day Post-Op    Pain well managed. Hg 11.2. Pre-op  14.4. Suspect post-operative anemia.   - pain control, wound cares, physical therapy, occupational therapy and DVT prophylaxis per orthopedic surgery service    Coronary artery disease involving native coronary artery of native heart without angina pectoris  4/2014 s/p cardiac cath with 2 drug eluting stents. Asymptomatic.  - hold PTA ASA while on full dose ASA    Idiopathic chronic gout of multiple sites without tophus  - continue PTA allupurinol    CKD (chronic kidney disease) stage 3, GFR 30-59 ml/min  Baseline 1.2.     Acquired hypothyroidism  - continue PTA levothyroxine    DVT Prophylaxis: as per orthopedic surgery service - Defer to primary service, ASA 325mg x 42  Code Status: Full Code    Lines: PIV Left hand   Jackson catheter: None    Discussion: Medically, the patient appears stable. VSS. Patient is in good spirits today without complaints.    Disposition: Anticipate discharge 1-2 days, pending process in physical therapy    Attestation:  I have reviewed today's vital signs, notes, medications, labs and imaging.  Total time: 15 minutes    This patient was discussed with Dr. Charan Arenas. Plan as above.    Malina Fernández PA-C  Hospital Medicine      Interval History   Patient was seen this afternoon. She is in good spirits. Pain is well controlled, she feels it at times when she is moving but is comfortable at rest.    She has been participating in physical therapy and getting around ok. Does need assistance getting  up.  Good appetite.  Slept well overnight.  BM this morning. Passing gas. Voiding regularly.  Reports some numbness bilaterally in lower extremities.    Denies HA, lightheadedness, dizziness, fever, chills, chest pain, palpitations, SOB, cough, wheezes, abdominal pain, N/V/D, tingling in bilateral lower extremities.    Physical Exam   Temp:  [96  F (35.6  C)-98.5  F (36.9  C)] 98.3  F (36.8  C)  Pulse:  [70-92] 92  Resp:  [14-18] 18  BP: ()/() 134/54  SpO2:  [94 %-100 %] 98 %    Weights: There were no vitals filed for this visit. There is no height or weight on file to calculate BMI.    General: Appears well. Alert and orientated. Pleasant and cooperative. NAD. Non-toxic. Appears stated age.   CV: Regular rate, normal rhythm. Radial pulses are 2+ bilaterally. Distal pulses intact. Lower extremity edema to the mid-calf.  Respiratory: No accessory muscle usage. Clear to auscultation bilaterally. No wheezes, crackles or rhonchi.   GI: Soft, non-tender, non-distended. Bowel sounds are normoactive in a quadrants.  Skin: Warm, dry, intact. Did not assess incision, dressing appear clean and dry.  Musculoskeletal: Muscle tone is appropriate. Moves all extremities freely with limited range of motion right lower extremity due to pain and bandaged.  Neuro: Sensation to light touch of bilateral lower extremities is grossly intact.     Data     Recent Labs  Lab 01/31/18  1349 01/26/18  1116   WBC 5.0 6.4   HGB 14.4 13.5   MCV 94 96    187   INR 0.99  --    NA  --  132*   POTASSIUM  --  4.4   CHLORIDE  --  98   CO2  --  29   BUN  --  18   CR  --  1.24*   ANIONGAP  --  5   JANET  --  9.0   GLC  --  96         Recent Labs  Lab 01/26/18  1116   GLC 96        Unresulted Labs Ordered in the Past 30 Days of this Admission     No orders found for last 61 day(s).           Imaging  Recent Results (from the past 24 hour(s))   XR Pelvis Port 1/2 Views    Narrative    PELVIS PORTABLE ONE - TWO VIEW   1/31/2018 4:07 PM      HISTORY: Arthroplasty hip.     COMPARISON: None.       Impression    IMPRESSION: Intraoperative film shows femoral and acetabular  components of a right hip arthroplasty in standard position.    SHIRA NANCE MD   XR Pelvis w Hip Right 1 View    Narrative    PELVIS AND HIP RIGHT ONE VIEW   1/31/2018 5:15 PM     HISTORY: Postop hip arthroplasty.     COMPARISON: None.       Impression    IMPRESSION: Unremarkable postoperative appearance of a right total hip  arthroplasty.    SHIRA NANCE MD      I reviewed all new labs and imaging results over the last 24 hours. I personally reviewed no images or EKG's today.    Medications     NaCl 100 mL/hr at 01/31/18 2213       allopurinol  100 mg Oral Daily     levothyroxine  75 mcg Oral QAM AC     multivitamin, therapeutic with minerals  1 tablet Oral Daily     sodium chloride (PF)  3 mL Intracatheter Q8H     acetaminophen  975 mg Oral Q8H     gabapentin  100 mg Oral Daily     senna-docusate  1-2 tablet Oral BID     aspirin  325 mg Oral Daily     I have discussed patient with Dr. Charan Arenas.    Malina Fernández PA-C  [facility] Medicine

## 2018-02-01 NOTE — PLAN OF CARE
Problem: Patient Care Overview  Goal: Plan of Care/Patient Progress Review  Outcome: No Change  Patient hypothermic upon admission. Bear hugger reapplied, and warm blankets placed on patient. Continues to have low temperatures. Updated on-call MD whom advised to continue with bear hugger and warm blankets.    Clear lungs sounds. Numb lower extremities. Dressing is c/d/i. Hemovac in place to bulb suction. Blood in line but not yet in canister. Jackson draining light yellow urine. Paged on-call MD to request IV narcotic orders as patient currently only has oral medications available for pain management. Awaiting call back. /45  Pulse 83  Temp 96.3  F (35.7  C) (Axillary)  Resp 14  LMP 01/14/2013  SpO2 98%

## 2018-02-01 NOTE — CONSULTS
Care Transition Initial Assessment - RN  Reason For Consult: discharge planning   Met with: Patient.    DATA   Principal Problem:    Status post total replacement of right hip  Active Problems:    Idiopathic chronic gout of multiple sites without tophus    CKD (chronic kidney disease) stage 3, GFR 30-59 ml/min    Acquired hypothyroidism    Coronary artery disease involving native coronary artery of native heart without angina pectoris    Degenerative joint disease (DJD) of hip       Primary Care Clinic Name: [facility]  Primary Care MD Name: Joy Kam  Contact information and PCP information verified: Yes    ASSESSMENT  Cognitive Status: awake, alert and oriented.       Resources List: Transitional Care     Lives With: alone  Living Arrangements: apartment     Description of Support System: Supportive   Who is your support system?: Neighbor   Support Assessment: Lacks necessary supervision and assistance   Insurance Concerns: No Insurance issues identified      This writer met with pt, introduced self and role. Discussed discharge planning and Medicare guidelines in regards to home care and SNF benefits. Pt lives alone in a senior apartment complex. All of her family lives out of states. Patient plans to go to TCU after hospitalization. Patient was provided with Medicare certified nursing home list. Pts choices are as follows [facility] (Phone: [phone] Fax: [phone]); pt has been accepted at [facility].     PLAN    [facility] (Phone: [phone] Fax: [phone])      Discharge Planner   Discharge Plans in progress: TCU  Barriers to discharge plan: Mobility  Follow up plan: Handoff to [facility]       Entered by: АЛЕКСАНДР HERNANDEZ 02/01/2018 11:53 AM         Katja Emanuel, MSN, RN, Care Coordinator  [facility] [phone]  [facility] [phone]

## 2018-02-01 NOTE — DISCHARGE INSTRUCTIONS
1.  Follow up with Warner Garcia PA-C.  in 2 weeks for post op check and x rays as scheduled.  Call [phone] if appointment needed or questions  2.  Use pain medication as directed  3.  Keep incision clean, covered and dry until post op appointment.  You may shower and get incision wet if no drainage is present. You may change your dressing as needed.  Only use dry  guaze over Prineo glue tape wound closure and then apply paper tape to hold dressings in place.   4.  Continue physical therapy as soon as possible.  You will need to call a therapy department of your choice to arrange future appointments.  Your order for physical therapy is included in your discharge paperwork.   5.  Take Aspirin 325 mg  daily  for 42 days for anticoagulation

## 2018-02-01 NOTE — PLAN OF CARE
Problem: Patient Care Overview  Goal: Plan of Care/Patient Progress Review  Discharge Planner PT   Patient plan for discharge: TCU  Current status: min assist with bed mobility and ambulation; minimal pain in morning; more pain in pm; Pt is impulsive with behavior, moving quickly without using walker  Barriers to return to prior living situation: pain with mobility  Recommendations for discharge: TCU  Rationale for recommendations: limited ambulation distance; min assist with bed mobiliyt       Entered by: Rehana Van 02/01/2018 4:46 PM

## 2018-02-01 NOTE — PROGRESS NOTES
[facility] Orthopaedics Progress Note      Post-operative Day: 1 Day Post-Op    Procedure(s):  Right total hip arthroplasty.  - Wound Class: I-Clean      Subjective:  Patient alert and very talkative.  She denies pain and feels great.  Pain: absent  Chest pain, SOB:  No, nasal cannula O2 on in bed currently      Objective:  Blood pressure 134/54, pulse 92, temperature 98.3  F (36.8  C), temperature source Oral, resp. rate 18, last menstrual period 01/14/2013, SpO2 98 %, not currently breastfeeding.    Patient Vitals for the past 24 hrs:   BP Temp Temp src Pulse Resp SpO2   02/01/18 0458 134/54 98.3  F (36.8  C) Oral 92 18 98 %   01/31/18 2235 130/58 97.9  F (36.6  C) Oral 88 18 99 %   01/31/18 2142 - 97.9  F (36.6  C) Oral - - -   01/31/18 1956 - 97.4  F (36.3  C) Axillary - - -   01/31/18 1945 (!) 107/39 - - 85 - 94 %   01/31/18 1900 113/48 96.4  F (35.8  C) Axillary - - -   01/31/18 1845 122/45 - - 83 - -   01/31/18 1844 - 96.3  F (35.7  C) Axillary - - -   01/31/18 1830 122/49 - - - - -   01/31/18 1826 - 96  F (35.6  C) Axillary - - -   01/31/18 1815 112/48 - - - - -   01/31/18 1801 100/53 - - - - -   01/31/18 1800 (!) 83/58 - - 80 14 98 %   01/31/18 1749 - 96.5  F (35.8  C) Axillary - - -   01/31/18 1740 139/48 - Oral 92 16 94 %   01/31/18 1720 - - - - - 98 %   01/31/18 1715 109/41 97.5  F (36.4  C) Oral 87 16 96 %   01/31/18 1700 135/61 - - 88 16 96 %   01/31/18 1645 127/57 97.5  F (36.4  C) Oral 87 16 100 %   01/31/18 1632 110/52 97.5  F (36.4  C) Oral 87 16 -   01/31/18 1328 (!) 169/102 98.5  F (36.9  C) Oral 70 16 95 %       Wt Readings from Last 4 Encounters:   01/26/18 104.8 kg (231 lb)   01/09/18 106.1 kg (234 lb)   12/19/17 105.2 kg (232 lb)   11/03/17 104.8 kg (231 lb)         Motor function, sensation, and circulation intact   Yes. Moving R LE independently  Wound status: incisions are clean dry and intact. Yes. Post op dressings CDI and hemovac intact  Calf tenderness: Bilateral  No    Pertinent Labs    Lab Results: personally reviewed.     Recent Labs   Lab Test  01/31/18   1349  01/26/18   1116  01/02/18   1133  12/19/17   1213   05/02/16   1451   03/01/16   1308   12/29/12   0616  12/28/12   0610   04/14/11   0615   INR  0.99   --    --    --    --    --    --    --    --   2.28*  2.04*   < >  1.12   HGB  14.4  13.5   --   14.0   < >  12.7   < >  13.2   < >   --   10.2*   < >  11.9   HCT  41.4  40.1   --   40.1   < >  38.2   < >  39.3   < >   --    --    < >   --    MCV  94  96   --   94   < >  97   < >  97   < >   --    --    < >   --    PLT  159  187   --   188   < >  169   < >  171   < >   --    --    < >   --    NA   --   132*  135  127*   < >  139   < >  138   < >   --    --    < >  134   CRP   --    --    --    --    --   3.7   --   <2.9   --    --    --    --   27.2*    < > = values in this interval not displayed.       Plan: Anticoagulation protocol:  mg daily  x 42  days            Pain medications:  We will start patient on PO norco today.  She did not tolerate oxycodone well with previous surgeries             Weight bearing status:  WBAT.  Patient motivated to get healthier and was encouraged to be in chair for meals today            Disposition:  TCU on Saturday. Patient prefers [facility]  Nursing to change dressings today and remove hemovac if present.   Only use dry 4x8 guaze over Prineo wound closure tape and then apply paper tape to hold dressings in place and place TEDs on   S.S. Consulted to assist with D/C planning   D/C townsend and hemovac today            Continue cares and rehabilitation     Report completed by:  Warner Garcia PA-C  Date: 2/1/2018  Time: 7:54 AM

## 2018-02-01 NOTE — PLAN OF CARE
Problem: Patient Care Overview  Goal: Plan of Care/Patient Progress Review  Outcome: Improving  Patient pain controlled with scheduled Tylenol.  New dressing applied with removal of hemovac.  CMS intact.  Denies nausea.  Patient easily distracted when up and moving.  Requiring frequent reminders to keep hands on walker, etc.  Also, alarms active and audible in bed and in chair, as patient has been getting up without staff present.  Reminded patient to use call light with needs.

## 2018-02-01 NOTE — PLAN OF CARE
Problem: Patient Care Overview  Goal: Plan of Care/Patient Progress Review  Discharge Planner OT   Patient plan for discharge: TCU prior to returning home.  Current status: Min A x1 for supine to sit/sit to supine, sit to stand, and toilet transfer. CGA/RW for ambulation to/from toilet. Educated on LB dressing using AE, pt demonstrated and verbally understood.  Barriers to return to prior living situation: pain, hip precautions, lives alone, assist level  Recommendations for discharge: TCU  Rationale for recommendations: Decreased ADL independence and functional mobility.       Entered by: Joesph Valdez 02/01/2018 3:09 PM

## 2018-02-01 NOTE — PROGRESS NOTES
Patient dangled at bedside. Numbness still present in right hip. +2 pedal pulses. Still reporting no pain.

## 2018-02-01 NOTE — PROGRESS NOTES
PAS-RR    D: Per DHS regulation, SW completed and submitted PAS-RR to [place] [employer] Direct Connect via the Senior LinkAge Line.  PAS-RR confirmation # is : [id number]  P: Further questions may be directed to Senior LinkAge Line at #[phone], option #4 for PAS-RR staff.  Katja Emanuel, MSN, RN, Care Coordinator  [facility] [phone]  [facility] [phone]

## 2018-02-02 ENCOUNTER — APPOINTMENT (OUTPATIENT)
Dept: OCCUPATIONAL THERAPY | Facility: CLINIC | Age: 83
DRG: 470 | End: 2018-02-02
Attending: ORTHOPAEDIC SURGERY
Payer: COMMERCIAL

## 2018-02-02 ENCOUNTER — APPOINTMENT (OUTPATIENT)
Dept: PHYSICAL THERAPY | Facility: CLINIC | Age: 83
DRG: 470 | End: 2018-02-02
Attending: ORTHOPAEDIC SURGERY
Payer: COMMERCIAL

## 2018-02-02 ENCOUNTER — CARE COORDINATION (OUTPATIENT)
Dept: CARE COORDINATION | Facility: CLINIC | Age: 83
End: 2018-02-02

## 2018-02-02 PROBLEM — D64.9 POSTOPERATIVE ANEMIA: Status: ACTIVE | Noted: 2018-02-02

## 2018-02-02 LAB
GLUCOSE SERPL-MCNC: 77 MG/DL (ref 70–99)
HGB BLD-MCNC: 10.7 G/DL (ref 11.7–15.7)

## 2018-02-02 PROCEDURE — 40000193 ZZH STATISTIC PT WARD VISIT

## 2018-02-02 PROCEDURE — 97535 SELF CARE MNGMENT TRAINING: CPT | Mod: GO | Performed by: OCCUPATIONAL THERAPIST

## 2018-02-02 PROCEDURE — 36415 COLL VENOUS BLD VENIPUNCTURE: CPT | Performed by: ORTHOPAEDIC SURGERY

## 2018-02-02 PROCEDURE — 25000132 ZZH RX MED GY IP 250 OP 250 PS 637: Performed by: ORTHOPAEDIC SURGERY

## 2018-02-02 PROCEDURE — 99231 SBSQ HOSP IP/OBS SF/LOW 25: CPT | Performed by: PHYSICIAN ASSISTANT

## 2018-02-02 PROCEDURE — 12000000 ZZH R&B MED SURG/OB

## 2018-02-02 PROCEDURE — 25000132 ZZH RX MED GY IP 250 OP 250 PS 637: Performed by: PHYSICIAN ASSISTANT

## 2018-02-02 PROCEDURE — 40000133 ZZH STATISTIC OT WARD VISIT: Performed by: OCCUPATIONAL THERAPIST

## 2018-02-02 PROCEDURE — 97110 THERAPEUTIC EXERCISES: CPT | Mod: GP

## 2018-02-02 PROCEDURE — 85018 HEMOGLOBIN: CPT | Performed by: ORTHOPAEDIC SURGERY

## 2018-02-02 PROCEDURE — 82947 ASSAY GLUCOSE BLOOD QUANT: CPT | Performed by: ORTHOPAEDIC SURGERY

## 2018-02-02 PROCEDURE — 97116 GAIT TRAINING THERAPY: CPT | Mod: GP

## 2018-02-02 RX ADMIN — ACETAMINOPHEN 975 MG: 325 TABLET, FILM COATED ORAL at 19:59

## 2018-02-02 RX ADMIN — HYDROCODONE BITARTRATE AND ACETAMINOPHEN 1 TABLET: 5; 325 TABLET ORAL at 21:13

## 2018-02-02 RX ADMIN — HYDROCODONE BITARTRATE AND ACETAMINOPHEN 1 TABLET: 5; 325 TABLET ORAL at 17:01

## 2018-02-02 RX ADMIN — SENNOSIDES AND DOCUSATE SODIUM 1 TABLET: 8.6; 5 TABLET ORAL at 19:59

## 2018-02-02 RX ADMIN — GABAPENTIN 100 MG: 100 CAPSULE ORAL at 08:14

## 2018-02-02 RX ADMIN — HYDROCODONE BITARTRATE AND ACETAMINOPHEN 1 TABLET: 5; 325 TABLET ORAL at 12:36

## 2018-02-02 RX ADMIN — ALLOPURINOL 100 MG: 100 TABLET ORAL at 08:14

## 2018-02-02 RX ADMIN — ASPIRIN 325 MG ORAL TABLET 325 MG: 325 PILL ORAL at 08:13

## 2018-02-02 RX ADMIN — HYDROCODONE BITARTRATE AND ACETAMINOPHEN 1 TABLET: 5; 325 TABLET ORAL at 05:54

## 2018-02-02 RX ADMIN — LEVOTHYROXINE SODIUM 75 MCG: 75 TABLET ORAL at 05:54

## 2018-02-02 RX ADMIN — MULTIPLE VITAMINS W/ MINERALS TAB 1 TABLET: TAB at 08:14

## 2018-02-02 NOTE — PLAN OF CARE
Problem: Patient Care Overview  Goal: Plan of Care/Patient Progress Review  Discharge Planner PT   Patient plan for discharge: TCU  Current status: min assist with bed mobility, CGA with sit <> stand and ambulation with '; LE exs x 10 with assist  Barriers to return to prior living situation: pain with mobility  Recommendations for discharge: TCU  Rationale for recommendations: limited ambulation distance; min assist with bed mobiliy       Entered by: Aurora Segal 02/02/2018 2:29 PM

## 2018-02-02 NOTE — PLAN OF CARE
"Problem: Hip Arthroplasty (Total, Partial) (Adult)  Goal: Signs and Symptoms of Listed Potential Problems Will be Absent, Minimized or Managed (Hip Arthroplasty)  Signs and symptoms of listed potential problems will be absent, minimized or managed by discharge/transition of care (reference Hip Arthroplasty (Total, Partial) (Adult) CPG).   Outcome: Therapy, progress toward functional goals as expected  Pt able to get self in and out of bed. Up to the bathroom X2 with SBA and use of walker. Per pt discretion didn't want scheduled Tylenol preferring Norco for pain. Received one tab of Norco for discomfort and pt understands she can have two if pain is not controlled. Pt voices the Norco has been working to relieve pain.   Pt voices frustration that she \"can't move\". Pt is moving adequately for post op status.   IS at bedside and using indeply up 1250.   PCDs on overnight.         "

## 2018-02-02 NOTE — PROGRESS NOTES
[facility] Medicine Progress Note  Date of Service: 02/02/2018    Assessment & Plan   Elly Choe is a 82 year old female who presented on 1/31/2018 for scheduled Procedure(s):  ARTHROPLASTY HIP by Sen Parry MD and is being followed by the hospital medicine service for co-management of acute and/or chronic perioperative medical problems.      S/p Procedure(s):  ARTHROPLASTY HIP   2 Days Post-Op    Pain not well managed, though improved after norco. Hg 10.7.   - pain control, wound cares, physical therapy, occupational therapy and DVT prophylaxis per orthopedic surgery service    Coronary artery disease involving native coronary artery of native heart without angina pectoris  4/2014 s/p cardiac cath with 2 drug eluting stents. Asymptomatic.  - hold PTA ASA while on full dose ASA     Idiopathic chronic gout of multiple sites without tophus  - continue PTA allupurinol     CKD (chronic kidney disease) stage 3, GFR 30-59 ml/min  Baseline 1.2.      Acquired hypothyroidism  - continue PTA levothyroxine    DVT Prophylaxis: as per orthopedic surgery service - Defer to primary service,  mg  Code Status: Full Code    Lines: PIV left hand   Jackson catheter: None    Discussion: Medically, the patient appears stable. VSS.     Disposition: Anticipate discharge Saturday to TCU.     Attestation:  I have reviewed today's vital signs, notes, medications, labs and imaging.  Total time: 15 minutes    This patient was discussed with Dr. Charan Arenas. Plan as above.    Malina Fernández PA-C  [facility] Medicine      Interval History   Patient was seen this morning. She is in good spirits.     Her pain has increased. She has not been taking many pain medications, she prefers to not use medications if she can. She did take a Norco this morning and her pain improved.  She feels her activity today is somewhat limited due to pain.  She has a good appetite.  Did not sleep well due  to   Having BMs, passing gas. Voiding regularly.  BM/Voiding/Gas    Plan to discharge tomorrow to TCU.    Denies HA, lightheadedness, dizziness, fever, chills, chest pain, palpitations, SOB, cough, wheezes, abdominal pain, N/V/D, numbness or tingling in bilateral lower extremities.    Physical Exam   Temp:  [97.7  F (36.5  C)-98.5  F (36.9  C)] 97.7  F (36.5  C)  Pulse:  [77-94] 77  Resp:  [18] 18  BP: (132-155)/(54-68) 132/55  SpO2:  [95 %-98 %] 95 %    Weights: There were no vitals filed for this visit. There is no height or weight on file to calculate BMI.    General: Appears well this morning, lying in bed. Alert and orientated. Pleasant and cooperative. NAD. Non-toxic. Appears stated age.   CV: Regular rate, normal rhythm. Radial pulses are 2+ bilaterally. Distal pulses intact. Capillary refill is < 2 seconds in bilateral lower extremities. Trace lower extremity edema to the mid-shin.  Respiratory: No accessory muscle usage. Clear to auscultation bilaterally. No wheezes, crackles or rhonchi.   GI: Soft, non-tender, non-distended. Bowel sounds are normoactive in a quadrants.  Skin: Warm, dry, intact. Did not assess incision, dressing appear clean and dry. Slight redness/bruising surrounding bandage.  Musculoskeletal: Muscle tone is appropriate. Moves all extremities freely with limited range of motion right lower extremity due to pain and bandaged.  Neuro: Sensation to light touch of bilateral lower extremities is grossly intact.     Data     Recent Labs  Lab 02/01/18  0645 01/31/18  1349 01/26/18  1116   WBC  --  5.0 6.4   HGB 11.2* 14.4 13.5   MCV  --  94 96   PLT  --  159 187   INR  --  0.99  --    NA  --   --  132*   POTASSIUM  --   --  4.4   CHLORIDE  --   --  98   CO2  --   --  29   BUN  --   --  18   CR  --   --  1.24*   ANIONGAP  --   --  5   JANET  --   --  9.0   *  --  96         Recent Labs  Lab 02/01/18  0645 01/26/18  1116   * 96        Unresulted Labs Ordered in the Past 30 Days of this  Admission     No orders found from 12/2/2017 to 2/1/2018.           Imaging  No results found for this or any previous visit (from the past 24 hour(s)).     I reviewed all new labs and imaging results over the last 24 hours. I personally reviewed no images or EKG's today.    Medications       allopurinol  100 mg Oral Daily     levothyroxine  75 mcg Oral QAM AC     multivitamin, therapeutic with minerals  1 tablet Oral Daily     sodium chloride (PF)  3 mL Intracatheter Q8H     acetaminophen  975 mg Oral Q8H     gabapentin  100 mg Oral Daily     senna-docusate  1-2 tablet Oral BID     aspirin  325 mg Oral Daily     I have discussed patient with Dr. Charan Arenas.    Malina Fernández, [facility] Medicine

## 2018-02-02 NOTE — LETTER
Hand-off  for Care Coordination  What is Care Coordination?  [facility] Care Coordination Services are available to people in complex situations,   for example medical, social or financial. The Care Coordinator, a SW or an RN, works with the   patient and their doctor to determine health goals, obtain resources, achieve outcomes,   and develop plans to coordinate care across settings.      o Patient Name:   Elly Choe  o Patient :     1935  o Patient PCP:     ROSA Patel CNP    o Patient Primary Clinic:   [address] [place] [address]  o D/C Facility: _____________________________________________   o TCU Contact Info for questions: ___________________________  o D/C Date:  ______________________________________________  o Follow-up Apt with PCP after TCU D/C:   ____________________  o Other Follow-up Apt s:      _________________________________________  Additional information (concerns, and Home Care, ect ):   __________________________________________________________________  __________________________________________________________________  Care Coordinator to Contact  **Please fax this sheet back to me when pt is ready to discharge**   Ambreen Olvin RN    Clinic Care Coordinator  [facility]  Fax: [phone]  or e-mail [email or web address]  Phone: [phone]

## 2018-02-02 NOTE — PLAN OF CARE
Problem: Patient Care Overview  Goal: Plan of Care/Patient Progress Review  Discharge Planner OT   Patient plan for discharge: TCU  Current status: Pt. Completed bed mobility with assist of 1. Pt. Completed toilet transfer with CGA and ambulating with 2WW with CGA.   Barriers to return to prior living situation: lives alone, hip precautions  Recommendations for discharge: TCU  Rationale for recommendations: Decreased independence with ADL to go home to living alone.       Entered by: Coty Larry 02/02/2018 2:46 PM

## 2018-02-02 NOTE — PROGRESS NOTES
[facility] Orthopaedics Progress Note      Post-operative Day: 2 Days Post-Op    Procedure(s):  Right total hip arthroplasty.  - Wound Class: I-Clean      Subjective:    Pain: minimal  Chest pain, SOB:  No      Objective:  Blood pressure 132/55, pulse 77, temperature 97.7  F (36.5  C), temperature source Oral, resp. rate 18, last menstrual period 01/14/2013, SpO2 95 %, not currently breastfeeding.    Patient Vitals for the past 24 hrs:   BP Temp Temp src Pulse Resp SpO2   02/01/18 2332 132/55 97.7  F (36.5  C) Oral 77 18 95 %   02/01/18 1604 155/54 98.5  F (36.9  C) Oral 79 18 98 %   02/01/18 0900 139/68 98.3  F (36.8  C) Oral 94 18 97 %       Wt Readings from Last 4 Encounters:   01/26/18 104.8 kg (231 lb)   01/09/18 106.1 kg (234 lb)   12/19/17 105.2 kg (232 lb)   11/03/17 104.8 kg (231 lb)     Motor function, sensation, and circulation intact   Yes  Wound status: incisions are clean dry and intact. Yes  Calf tenderness: Right  mildly tender. Neg homans    Pertinent Labs   Lab Results: personally reviewed.     Recent Labs   Lab Test  02/01/18   0645  01/31/18   1349  01/26/18   1116  01/02/18   1133  12/19/17   1213   05/02/16   1451   03/01/16   1308   12/29/12   0616  12/28/12   0610   04/14/11   0615   INR   --   0.99   --    --    --    --    --    --    --    --   2.28*  2.04*   < >  1.12   HGB  11.2*  14.4  13.5   --   14.0   < >  12.7   < >  13.2   < >   --   10.2*   < >  11.9   HCT   --   41.4  40.1   --   40.1   < >  38.2   < >  39.3   < >   --    --    < >   --    MCV   --   94  96   --   94   < >  97   < >  97   < >   --    --    < >   --    PLT   --   159  187   --   188   < >  169   < >  171   < >   --    --    < >   --    NA   --    --   132*  135  127*   < >  139   < >  138   < >   --    --    < >  134   CRP   --    --    --    --    --    --   3.7   --   <2.9   --    --    --    --   27.2*    < > = values in this interval not displayed.       Plan: Anticoagulation protocol:  mg daily  x 42   days            Pain medications:  norco            Weight bearing status:  WBAT            Disposition:  TCU saturday             Continue cares and rehabilitation. Pt denies calf pain at rest, min swelling. Will observe, if persists or worsens order u/s to r/o DVT    Report completed by:  Bethany Chavarria PA-C, CHRISTIE  Date: 2/2/2018  Time: 7:45 AM

## 2018-02-02 NOTE — PROGRESS NOTES
Transition Communication Hand-off for Care Transitions to Next Level of Care Provider    Name: Elly Choe  MRN #: [id number]  Primary Care Provider: Joy Kam  Primary Care MD Name: Joy Kam  Primary Clinic: [address] [address]  Primary Care Clinic Name: [facility]  Reason for Hospitalization:  right hip degenerative joint disease  Degenerative joint disease (DJD) of hip  Admit Date/Time: 1/31/2018  1:07 PM  Discharge Date: 2/3/18  Payor Source: Payor: MEDICA / Plan: MEDICA [employer] NON/FV PARTNERS / Product Type: Indemnity /     Readmission Assessment Measure (SMITA) Risk Score/category: Low        Reason for Communication Hand-off Referral: TCU    Discharge Plan:  [place] [facility] (Phone: [phone] Fax: [phone])       Concern for non-adherence with plan of care:   Y/N No  Discharge Needs Assessment:  Needs       Most Recent Value    Equipment Currently Used at Home grab bar, shower chair, walker, rolling    Transportation Available public transportation    Other Resources Transportation Services    Transportation Agency -- [[employer]]    Transitional Care -- [[facility]]    PAS Number [id number]                Katia Flannery RN, Care Coordinator    AVS/Discharge Summary is the source of truth; this is a helpful guide for improved communication of patient story

## 2018-02-02 NOTE — PROGRESS NOTES
Care Coordination Communication    Referral Source:  CTS    Clinical Data: RN CC has received a referral from CTS staff identifying a need for RN CC  to follow up with the patient once they are discharged.     Plan:  RN CC will outreach and follow up with the patient once they are discharged from the hospital. RN CC will continue to monitor for the patient to discharge.        Ambreen Bah RN, [facility]  RN Care Coordinator  [facility]  Phone # [phone]  2/2/2018 1:52 PM

## 2018-02-03 ENCOUNTER — APPOINTMENT (OUTPATIENT)
Dept: PHYSICAL THERAPY | Facility: CLINIC | Age: 83
DRG: 470 | End: 2018-02-03
Attending: ORTHOPAEDIC SURGERY
Payer: COMMERCIAL

## 2018-02-03 ENCOUNTER — APPOINTMENT (OUTPATIENT)
Dept: OCCUPATIONAL THERAPY | Facility: CLINIC | Age: 83
DRG: 470 | End: 2018-02-03
Attending: ORTHOPAEDIC SURGERY
Payer: COMMERCIAL

## 2018-02-03 VITALS
RESPIRATION RATE: 18 BRPM | OXYGEN SATURATION: 97 % | HEART RATE: 70 BPM | DIASTOLIC BLOOD PRESSURE: 42 MMHG | TEMPERATURE: 97.5 F | SYSTOLIC BLOOD PRESSURE: 106 MMHG

## 2018-02-03 PROCEDURE — 97116 GAIT TRAINING THERAPY: CPT | Mod: GP | Performed by: PHYSICAL THERAPIST

## 2018-02-03 PROCEDURE — 25000132 ZZH RX MED GY IP 250 OP 250 PS 637: Performed by: ORTHOPAEDIC SURGERY

## 2018-02-03 PROCEDURE — 25000132 ZZH RX MED GY IP 250 OP 250 PS 637: Performed by: PHYSICIAN ASSISTANT

## 2018-02-03 PROCEDURE — 97535 SELF CARE MNGMENT TRAINING: CPT | Mod: GO

## 2018-02-03 PROCEDURE — 40000193 ZZH STATISTIC PT WARD VISIT: Performed by: PHYSICAL THERAPIST

## 2018-02-03 PROCEDURE — 40000133 ZZH STATISTIC OT WARD VISIT

## 2018-02-03 PROCEDURE — 97110 THERAPEUTIC EXERCISES: CPT | Mod: GP | Performed by: PHYSICAL THERAPIST

## 2018-02-03 RX ORDER — ASPIRIN 325 MG
325 TABLET ORAL DAILY
Qty: 40 TABLET | Refills: 0 | Status: SHIPPED | OUTPATIENT
Start: 2018-02-04 | End: 2018-03-16

## 2018-02-03 RX ORDER — HYDROCODONE BITARTRATE AND ACETAMINOPHEN 5; 325 MG/1; MG/1
1-2 TABLET ORAL EVERY 4 HOURS PRN
Qty: 60 TABLET | Refills: 0 | Status: SHIPPED | OUTPATIENT
Start: 2018-02-03 | End: 2018-02-26

## 2018-02-03 RX ORDER — AMOXICILLIN 250 MG
1-2 CAPSULE ORAL DAILY PRN
Qty: 30 TABLET | Refills: 0 | Status: SHIPPED | OUTPATIENT
Start: 2018-02-03 | End: 2018-02-12 | Stop reason: DRUGHIGH

## 2018-02-03 RX ADMIN — MULTIPLE VITAMINS W/ MINERALS TAB 1 TABLET: TAB at 07:35

## 2018-02-03 RX ADMIN — ALLOPURINOL 100 MG: 100 TABLET ORAL at 07:35

## 2018-02-03 RX ADMIN — ACETAMINOPHEN 975 MG: 325 TABLET, FILM COATED ORAL at 11:18

## 2018-02-03 RX ADMIN — LEVOTHYROXINE SODIUM 75 MCG: 75 TABLET ORAL at 06:30

## 2018-02-03 RX ADMIN — SENNOSIDES AND DOCUSATE SODIUM 1 TABLET: 8.6; 5 TABLET ORAL at 07:35

## 2018-02-03 RX ADMIN — HYDROCODONE BITARTRATE AND ACETAMINOPHEN 1 TABLET: 5; 325 TABLET ORAL at 01:21

## 2018-02-03 RX ADMIN — ASPIRIN 325 MG ORAL TABLET 325 MG: 325 PILL ORAL at 07:35

## 2018-02-03 RX ADMIN — HYDROCODONE BITARTRATE AND ACETAMINOPHEN 1 TABLET: 5; 325 TABLET ORAL at 06:30

## 2018-02-03 NOTE — PHARMACY - DISCHARGE MEDICATION RECONCILIATION
Discharge medication review for this patient is complete. Pharmacist assisted with medication reconciliation of discharge medications with prior to admission medications.     The following changes were made to the discharge medication list based on pharmacist review:  Added:  none  Discontinued: none  Changed: none      Patient's Discharge Medication List  - medications as listed on After Visit Summary (AVS)     Review of your medicines      START taking       Dose / Directions    aspirin 325 MG tablet   Used for:  Primary localized osteoarthrosis of right lower leg   Replaces:  aspirin 81 MG EC tablet        Dose:  325 mg   Start taking on:  2/4/2018   Take 1 tablet (325 mg) by mouth daily   Quantity:  40 tablet   Refills:  0       HYDROcodone-acetaminophen 5-325 MG per tablet   Commonly known as:  NORCO   Used for:  Primary localized osteoarthrosis of right lower leg        Dose:  1-2 tablet   Take 1-2 tablets by mouth every 4 hours as needed for moderate to severe pain   Quantity:  60 tablet   Refills:  0       senna-docusate 8.6-50 MG per tablet   Commonly known as:  SENOKOT-S;PERICOLACE   Used for:  Primary localized osteoarthrosis of right lower leg        Dose:  1-2 tablet   Take 1-2 tablets by mouth daily as needed for constipation   Quantity:  30 tablet   Refills:  0         CONTINUE these medicines which have NOT CHANGED       Dose / Directions    allopurinol 100 MG tablet   Commonly known as:  ZYLOPRIM   Used for:  Gouty arthropathy        TAKE ONE TABLET BY MOUTH EVERY DAY   Quantity:  90 tablet   Refills:  3       GLUCOSAMINE-CHONDROITIN PO        Dose:  1 tablet   Take 1 tablet by mouth 2 times daily   Refills:  0       levothyroxine 75 MCG tablet   Commonly known as:  SYNTHROID/LEVOTHROID   Used for:  Acquired hypothyroidism        Dose:  75 mcg   Take 1 tablet (75 mcg) by mouth daily   Quantity:  90 tablet   Refills:  3       multivitamin, therapeutic with minerals Tabs tablet   Used for:  Anemia,  unspecified anemia type        Dose:  1 tablet   Take 1 tablet by mouth daily   Quantity:  100 tablet   Refills:  3       TYLENOL PO        Dose:  325 mg   Take 325 mg by mouth every 4 hours as needed for mild pain or fever   Refills:  0         STOP taking          aspirin 81 MG EC tablet   Replaced by:  aspirin 325 MG tablet                Where to get your medicines      These medications were sent to [facility] [address] [place] [address]     Phone:  [phone]      aspirin 325 MG tablet     senna-docusate 8.6-50 MG per tablet         Some of these will need a paper prescription and others can be bought over the counter. Ask your nurse if you have questions.     Bring a paper prescription for each of these medications      HYDROcodone-acetaminophen 5-325 MG per tablet

## 2018-02-03 NOTE — PROGRESS NOTES
Discharge Planner OT   Patient plan for discharge: TCU  Current status: SBA sit to stand with pt c/o weakness today after BM. Pt marisela 10 min stand at sink leaning onto sink with min c/o R hip discomfort. Reviewed hip precautions with pt unaware. LE dressing min A with use of AE  Barriers to return to prior living situation: adhering to hip precautions, assist needed with ADLs and transfers with use of FWW  Recommendations for discharge: TCU  Rationale for recommendations: as stated above       Entered by: Ana Yates 02/03/2018 11:25 AM

## 2018-02-03 NOTE — PLAN OF CARE
Problem: Hip Arthroplasty (Total, Partial) (Adult)  Goal: Signs and Symptoms of Listed Potential Problems Will be Absent, Minimized or Managed (Hip Arthroplasty)  Signs and symptoms of listed potential problems will be absent, minimized or managed by discharge/transition of care (reference Hip Arthroplasty (Total, Partial) (Adult) CPG).   Outcome: Adequate for Discharge Date Met: 02/03/18  Pt ambulating with SBA and walker.   1 Norco given overnight with pain relief throughout the night. Denied pain/pain med when reassessed at 0530 and agrees to take one prior to getting up for the am.

## 2018-02-03 NOTE — DISCHARGE SUMMARY
[facility] Orthopedics Discharge Summary                                  [facility]     TIFFANY RAMEY [id number]   Age: 82 year old  PCP: Joy Kam, [phone] 1935     Date of Admission:  1/31/2018  Date of Discharge::  2/3/2018  Discharge Physician:  Ralph Ross    Code status:  Full Code    Admission Information:  Admission Diagnosis:  right hip degenerative joint disease  Degenerative joint disease (DJD) of hip    Post-Operative Day: 3 Days Post-Op     Reason for admission:  The patient was admitted for the following:Procedure(s) (LRB):  ARTHROPLASTY HIP (Right)    Principal Problem:    Status post total replacement of right hip  Active Problems:    Idiopathic chronic gout of multiple sites without tophus    CKD (chronic kidney disease) stage 3, GFR 30-59 ml/min    Acquired hypothyroidism    Coronary artery disease involving native coronary artery of native heart without angina pectoris    Degenerative joint disease (DJD) of hip    Postoperative anemia      Allergies:  Sulfa drugs    Following the procedure noted above the patient was transferred to the post-op floor and started on:    Therapy:  physical therapy and occupational therapy  Anticoagulation Plan:  mg daily  for 42 days  Pain Management: norco  Weight bearing status: Weight bearing as tolerated     The patient was followed and co-managed by the hospitalist service during the inpatient treatment course  Complications:  None  Consultations:  None     Pertinent Labs   Lab Results: personally reviewed.     Recent Labs   Lab Test  02/02/18   0749  02/01/18   0645  01/31/18   1349  01/26/18   1116  01/02/18   1133  12/19/17   1213   05/02/16   1451   03/01/16   1308   12/29/12   0616  12/28/12   0610   04/14/11   0615   INR   --    --   0.99   --    --    --    --    --    --    --    --   2.28*  2.04*   < >  1.12   HGB  10.7*  11.2*  14.4  13.5   --   14.0   < >  12.7   < >  13.2   < >   --    10.2*   < >  11.9   HCT   --    --   41.4  40.1   --   40.1   < >  38.2   < >  39.3   < >   --    --    < >   --    MCV   --    --   94  96   --   94   < >  97   < >  97   < >   --    --    < >   --    PLT   --    --   159  187   --   188   < >  169   < >  171   < >   --    --    < >   --    NA   --    --    --   132*  135  127*   < >  139   < >  138   < >   --    --    < >  134   CRP   --    --    --    --    --    --    --   3.7   --   <2.9   --    --    --    --   27.2*    < > = values in this interval not displayed.          Discharge Information:  Condition at discharge: Stable  Discharge destination:  Discharged to short-term care facility     Medications at discharge:  Current Discharge Medication List      START taking these medications    Details   senna-docusate (SENOKOT-S;PERICOLACE) 8.6-50 MG per tablet Take 1-2 tablets by mouth daily as needed for constipation  Qty: 30 tablet, Refills: 0    Associated Diagnoses: Primary localized osteoarthrosis of right lower leg      aspirin 325 MG tablet Take 1 tablet (325 mg) by mouth daily  Qty: 40 tablet, Refills: 0    Associated Diagnoses: Primary localized osteoarthrosis of right lower leg      HYDROcodone-acetaminophen (NORCO) 5-325 MG per tablet Take 1-2 tablets by mouth every 4 hours as needed for moderate to severe pain  Qty: 60 tablet, Refills: 0    Associated Diagnoses: Primary localized osteoarthrosis of right lower leg         CONTINUE these medications which have NOT CHANGED    Details   allopurinol (ZYLOPRIM) 100 MG tablet TAKE ONE TABLET BY MOUTH EVERY DAY  Qty: 90 tablet, Refills: 3    Associated Diagnoses: Gouty arthropathy      Acetaminophen (TYLENOL PO) Take 325 mg by mouth every 4 hours as needed for mild pain or fever      GLUCOSAMINE-CHONDROITIN PO Take 1 tablet by mouth 2 times daily      levothyroxine (SYNTHROID/LEVOTHROID) 75 MCG tablet Take 1 tablet (75 mcg) by mouth daily  Qty: 90 tablet, Refills: 3    Associated Diagnoses: Acquired  hypothyroidism      multivitamin, therapeutic with minerals (MULTI-VITAMIN) TABS Take 1 tablet by mouth daily  Qty: 100 tablet, Refills: 3    Associated Diagnoses: Anemia, unspecified anemia type         STOP taking these medications       aspirin EC 81 MG EC tablet Comments:   Reason for Stopping:                          Follow-Up Care:  Patient should be seen in the office in 14 days by the Orthopedic Surgeon/Physician Assistant.  Call [phone] for appointment or questions.    Ralph Ross

## 2018-02-03 NOTE — PROGRESS NOTES
Care Transitions Discharge:    Name: Elly Choe    MRN: [id number]    Reason for Hospitalization: right hip degenerative joint disease  Degenerative joint disease (DJD) of hip    Cognitive/Behavioral Status: awake, alert and oriented    Follow-up Appointments: No future appointments.    Discharge Date:  2/3/2018    Patient/Care Partner in agreement and understands the discharge plan:  Yes    Discharge Disposition:  [place] [facility] (Phone: [phone] Fax: [phone]).     Discharge Planner   Discharge Plans in progress: TCU  Barriers to discharge plan: None  Follow up plan: Follow up with ortho       Entered by: Katia Flannery 02/03/2018 12:11 PM         Katia Flannery RN Care Coordinator  [phone]

## 2018-02-03 NOTE — PROGRESS NOTES
This RN took over cares for patient @ 1900, patient has been resting comfortably, received one tab of Norco on request, pain is adequately controlled, no other issues have arisen.  Planning d/c tomorrow to [place] [facility].

## 2018-02-03 NOTE — PLAN OF CARE
Problem: Patient Care Overview  Goal: Plan of Care/Patient Progress Review  Outcome: Improving  Discharge Planner PT   Patient plan for discharge: TCU today ~ 1 pm  Current status: Elly had bowel movement earlier this am and is exhausted she states.  Appears to be onboard and progressing towards TCU today.  Barriers to return to prior living situation: Safety, level of assistance, pain managment  Recommendations for discharge: TCU is appropriate for best long term outcomes and progression   Rationale for recommendations: Limited motion, pain, decreased strength and requires assistance with mobility.       Entered by: Barbara Hartley 02/03/2018 8:31 AM

## 2018-02-03 NOTE — PROGRESS NOTES
MÓNICA [facility]KEON DISCHARGE NOTE    Patient discharged to transitional care unit at 1:15 PM via wheel chair. Accompanied by other:[facility] staff. Discharge instructions reviewed with Report called to SONG Presley at [facility], opportunity offered to ask questions. Prescriptions sent to patients preferred pharmacy. All belongings sent with patient.    Stacy Gonsales

## 2018-02-03 NOTE — PROGRESS NOTES
[facility] Orthopaedics Progress Note      Post-operative Day: 3 Days Post-Op    Procedure(s):  Right total hip arthroplasty.  - Wound Class: I-Clean      Subjective:    Pain: minimal  Chest pain, SOB:  No      Objective:  Blood pressure 106/42, pulse 70, temperature 97.5  F (36.4  C), temperature source Oral, resp. rate 18, last menstrual period 01/14/2013, SpO2 97 %, not currently breastfeeding.    Patient Vitals for the past 24 hrs:   BP Temp Temp src Pulse Resp SpO2   02/03/18 0722 106/42 97.5  F (36.4  C) Oral 70 18 97 %   02/02/18 2348 140/54 98.8  F (37.1  C) Oral 79 16 95 %   02/02/18 1539 152/40 97.6  F (36.4  C) Oral 80 18 97 %       Wt Readings from Last 4 Encounters:   01/26/18 231 lb (104.8 kg)   01/09/18 234 lb (106.1 kg)   12/19/17 232 lb (105.2 kg)   11/03/17 231 lb (104.8 kg)         Motor function, sensation, and circulation intact   Yes  Wound status: incisions are clean dry and intact. Yes  Calf tenderness: Bilateral  No    Pertinent Labs   Lab Results: personally reviewed.     Recent Labs   Lab Test  02/02/18   0749  02/01/18   0645  01/31/18   1349  01/26/18   1116  01/02/18   1133  12/19/17   1213   05/02/16   1451   03/01/16   1308   12/29/12   0616  12/28/12   0610   04/14/11   0615   INR   --    --   0.99   --    --    --    --    --    --    --    --   2.28*  2.04*   < >  1.12   HGB  10.7*  11.2*  14.4  13.5   --   14.0   < >  12.7   < >  13.2   < >   --   10.2*   < >  11.9   HCT   --    --   41.4  40.1   --   40.1   < >  38.2   < >  39.3   < >   --    --    < >   --    MCV   --    --   94  96   --   94   < >  97   < >  97   < >   --    --    < >   --    PLT   --    --   159  187   --   188   < >  169   < >  171   < >   --    --    < >   --    NA   --    --    --   132*  135  127*   < >  139   < >  138   < >   --    --    < >  134   CRP   --    --    --    --    --    --    --   3.7   --   <2.9   --    --    --    --   27.2*    < > = values in this interval not displayed.       Plan:  Anticoagulation protocol:  mg daily  x 42  days            Pain medications:  norco            Weight bearing status:  WBAT            Disposition:  [facility] today.  Follow up with Dr. Parry in 2 weeks as scheduled             Continue cares and rehabilitation     Report completed by:  Ralph Ross MD  Date: 2/3/2018  Time: 8:58 AM

## 2018-02-05 NOTE — PROGRESS NOTES
Clinic Care Coordination Contact  Care Coordination Transition Communication    Referral Source: CTS    Clinical Data: Patient was hospitalized at [facility] from 1/31/18 to 2/3/18 with diagnosis of R MARY ANNE.     Transition to Facility:              Facility Name: [facility]              Contact name and phone number/fax: Katelynn DUPONT  [phone] Fax [phone]    Plan: RN/SW Care Coordinator will await notification from facility staff informing RN/SW Care Coordinator of patient's discharge plans/needs. RN/SW Care Coordinator will review chart and outreach to facility staff every 2-3 weeks and as needed.     Ambreen Bah RN, [facility]  RN Care Coordinator  [facility], [facility]  Phone # [phone]  2/5/2018 10:28 AM

## 2018-02-06 ENCOUNTER — NURSING HOME VISIT (OUTPATIENT)
Dept: GERIATRICS | Facility: CLINIC | Age: 83
End: 2018-02-06
Payer: COMMERCIAL

## 2018-02-06 VITALS
OXYGEN SATURATION: 96 % | BODY MASS INDEX: 42.23 KG/M2 | HEART RATE: 77 BPM | WEIGHT: 238.4 LBS | TEMPERATURE: 98.5 F | SYSTOLIC BLOOD PRESSURE: 113 MMHG | DIASTOLIC BLOOD PRESSURE: 53 MMHG | RESPIRATION RATE: 20 BRPM

## 2018-02-06 DIAGNOSIS — K59.01 SLOW TRANSIT CONSTIPATION: ICD-10-CM

## 2018-02-06 DIAGNOSIS — D62 ANEMIA DUE TO BLOOD LOSS, ACUTE: ICD-10-CM

## 2018-02-06 DIAGNOSIS — R53.81 PHYSICAL DECONDITIONING: ICD-10-CM

## 2018-02-06 DIAGNOSIS — Z96.641 STATUS POST TOTAL HIP REPLACEMENT, RIGHT: Primary | ICD-10-CM

## 2018-02-06 DIAGNOSIS — M10.9 GOUTY ARTHROPATHY: ICD-10-CM

## 2018-02-06 DIAGNOSIS — M16.11 PRIMARY OSTEOARTHRITIS OF RIGHT HIP: ICD-10-CM

## 2018-02-06 DIAGNOSIS — N18.30 CKD (CHRONIC KIDNEY DISEASE) STAGE 3, GFR 30-59 ML/MIN (H): ICD-10-CM

## 2018-02-06 DIAGNOSIS — I25.10 CORONARY ARTERY DISEASE INVOLVING NATIVE CORONARY ARTERY OF NATIVE HEART WITHOUT ANGINA PECTORIS: ICD-10-CM

## 2018-02-06 PROCEDURE — 99310 SBSQ NF CARE HIGH MDM 45: CPT | Performed by: NURSE PRACTITIONER

## 2018-02-06 NOTE — PROGRESS NOTES
[facility]  PRIMARY CARE PROVIDER AND CLINIC:  Joy Kam [address] [place] [id number]  Chief Complaint   Patient presents with     Hospital F/U       HPI:    Elly Choe is a 82 year old  (1935),admitted to the [facility] from [facility].  Hospital stay 1/31/18 through 2/3/18.  Admitted to this facility for  rehab, medical management and nursing care.  HPI information obtained from: facility chart records, facility staff, patient report and [facility] chart review.      Elly Choe is an 82 year old female with PMH of CAD, CKD, gout, hypothyroidism and bilat knee and shoulder replacements who underwent elective right total hip replacement on 1/31/2018. Post-operative course was uncomplicated. Pain was controlled on norco. She was discharged to TCU on POD # 3 or rehab and medical management.    Current issues are:      Status post total hip replacement, right  Primary osteoarthritis of right hip  Physical deconditioning  Reports significant pain this AM, but only took 1 norco. She also has PRN tylenol available.She report most of her pain is in her right knee and buttock. She does use icy hot at home for her arthritis. Feels her normal arthritis pain worse due to swelling in her leg. She is on ASA 325mg x 42 days. She is on norco 1-2 tab PO q 4 hr PRN,and APAP 325mg q 4 hr PRN.     Slow transit constipation  Reports no BM x 3 days. She is currently on senna-s 1 tab BID PRN. Feels like her appetite is ok. Denies any abdominal pain, bloating, nausea or indigestion.     Gouty arthropathy  On PTA allopurinol, reports no recent flare-ups.     CKD (chronic kidney disease) stage 3, GFR 30-59 ml/min  Baseline appears to be ~1.1-1.2. Stable during recent hospitalization.     Anemia due to blood loss, acute  Hgb 14.4 - >10.7. Denies any chest pain, palpitations, SOB, no melena or hematuria.     Coronary artery disease  involving native coronary artery of native heart without angina pectoris  History of coronary angiogram. Currently on ASA as above. No chest pain or SOB.       CODE STATUS/ADVANCE DIRECTIVES DISCUSSION:   DNR / DNI  Patient's living condition: lives alone    ALLERGIES:Sulfa drugs  PAST MEDICAL HISTORY:  has a past medical history of ACS (acute coronary syndrome) (H) (4/14/2014); Acute dermatitis due to solar radiation (9/9/2010); Anemia (12/6/2012); Diverticulitis of colon (5/17/2005); HX OF COMPOUND HEMANGIOMA NOS [228.00]; HX OF SUPERFICIAL PHLEBITIS; Phlebitis and thrombophlebitis of superficial vessels of lower extremities (10/2/2012); S/P knee replacement left (1/2/2013); S/P PTCA (percutaneous transluminal coronary angioplasty) (6/18/2014); Status post shoulder joint replacement (10/5/2012); and Status post total shoulder arthroplasty, left (5/4/2016). She also has no past medical history of Diabetes (H) or PONV (postoperative nausea and vomiting).  PAST SURGICAL HISTORY:  has a past surgical history that includes hysterectomy, hong (1976); colonoscopy (2002); cataract iol, rt/lt (4/2010); Arthroscopy knee irrigation and debridement (12/10/2010); Arthroplasty knee (4/13/2011); tonsillectomy & adenoidectomy; Arthroplasty shoulder (9/26/2012); Arthroplasty knee (12/26/2012); Coronary Angiography Adult Order; Colonoscopy (6/19/2014); Arthroplasty shoulder (Left, 5/4/2016); and Arthroplasty hip (Right, 1/31/2018).  FAMILY HISTORY: family history includes Alcohol/Drug in her father; Arthritis (age of onset: 20) in her mother; DIABETES in her brother and brother; Other - See Comments in her daughter.  SOCIAL HISTORY:  reports that she quit smoking about 48 years ago. She has never used smokeless tobacco. She reports that she does not drink alcohol or use illicit drugs.    Post Discharge Medication Reconciliation Status: discharge medications reconciled and changed, per note/orders (see AVS).  Current Outpatient  Prescriptions   Medication Sig Dispense Refill     senna-docusate (SENOKOT-S;PERICOLACE) 8.6-50 MG per tablet Take 1-2 tablets by mouth daily as needed for constipation 30 tablet 0     aspirin 325 MG tablet Take 1 tablet (325 mg) by mouth daily 40 tablet 0     HYDROcodone-acetaminophen (NORCO) 5-325 MG per tablet Take 1-2 tablets by mouth every 4 hours as needed for moderate to severe pain 60 tablet 0     allopurinol (ZYLOPRIM) 100 MG tablet TAKE ONE TABLET BY MOUTH EVERY DAY 90 tablet 3     Acetaminophen (TYLENOL PO) Take 325 mg by mouth every 4 hours as needed for mild pain or fever       levothyroxine (SYNTHROID/LEVOTHROID) 75 MCG tablet Take 1 tablet (75 mcg) by mouth daily 90 tablet 3     multivitamin, therapeutic with minerals (MULTI-VITAMIN) TABS Take 1 tablet by mouth daily 100 tablet 3       ROS:  10 point ROS of systems including Constitutional, Eyes, Respiratory, Cardiovascular, Gastroenterology, Genitourinary, Integumentary, Muscularskeletal, Psychiatric were all negative except for pertinent positives noted in my HPI.    Exam:  /53  Pulse 77  Temp 98.5  F (36.9  C)  Resp 20  Wt 238 lb 6.4 oz (108.1 kg)  LMP 01/14/2013  SpO2 96%  BMI 42.23 kg/m2  GENERAL APPEARANCE:  Alert, in no distress, oriented, cooperative  RESP:  respiratory effort and palpation of chest normal, lungs clear to auscultation , no respiratory distress  CV:  Palpation and auscultation of heart done , regular rate and rhythm, no murmur, rub, or gallop, +2 pedal pulses, peripheral edema 2-3+ in right knee and thigh  ABDOMEN:  normal bowel sounds, soft, nontender, no hepatosplenomegaly or other masses, large,round, no guarding or rebound  M/S:   Gait and station abnormal decreased ROM, tenderness to right hip, decreased ROM to bilat shoulders, ambulates with walker  SKIN:  Inspection of skin and subcutaneous tissue baseline, Palpation of skin and subcutaneous tissue baseline, wound healing well, no signs of infection right  hipincision steri-strips/prineo tape, no drainage or erythema, no warmth,   NEURO:   Cranial nerves 2-12 are normal tested and grossly at patient's baseline, Examination of sensation by touch normal  PSYCH:  oriented X 3, normal insight, judgement and memory, affect and mood normal    Lab/Diagnostic data:    CBC RESULTS:   Recent Labs   Lab Test  02/02/18   0749  02/01/18   0645  01/31/18   1349  01/26/18   1116   WBC   --    --   5.0  6.4   RBC   --    --   4.43  4.19   HGB  10.7*  11.2*  14.4  13.5   HCT   --    --   41.4  40.1   MCV   --    --   94  96   MCH   --    --   32.5  32.2   MCHC   --    --   34.8  33.7   RDW   --    --   12.9  12.9   PLT   --    --   159  187       Last Basic Metabolic Panel:  Recent Labs   Lab Test  02/02/18   0749  02/01/18   0645  01/26/18   1116  01/02/18   1133   NA   --    --   132*  135   POTASSIUM   --    --   4.4  4.5   CHLORIDE   --    --   98  100   JANET   --    --   9.0  8.7   CO2   --    --   29  30   BUN   --    --   18  19   CR   --    --   1.24*  1.15*   GLC  77  118*  96  96       Liver Function Studies -   Recent Labs   Lab Test  12/19/17   1213  10/07/16   1501   PROTTOTAL  6.9  7.2   ALBUMIN  3.8  4.0   BILITOTAL  0.6  0.4   ALKPHOS  84  104   AST  20  22   ALT  20  23       TSH   Date Value Ref Range Status   12/19/2017 0.62 0.40 - 4.00 mU/L Final   06/01/2017 0.67 0.40 - 4.00 mU/L Final   ]    Lab Results   Component Value Date    A1C 5.6 02/11/2013    A1C 5.7 01/08/2007       ASSESSMENT/PLAN:  (Z96.641) Status post total hip replacement, right  (primary encounter diagnosis)  (M16.11) Primary osteoarthritis of right hip  (R53.81) Physical deconditioning  Comment: variable pain control, healing well, no s/s of infection  Plan: Will schedule norco during the day, continue PRN (no more than 2 tabs in 4 hours), ice PRN, PT/OT, compression stockings as tolerated, add icy hot to right knee. Do not apply near hip incision    (K59.01) Slow transit constipation  Comment:  currently symptomatic,   Plan: add scheduled senna, PRN miralax, diet as tolerated. Monitor and adjust PRN    (M10.9) Gouty arthropathy  Comment: chronic, stable  Plan: Continue medications as above, monitor and adjust PRN.    (N18.3) CKD (chronic kidney disease) stage 3, GFR 30-59 ml/min  Comment: chronic, stable  Plan: Control BP. Avoid nephrotoxic medications.    (D62) Anemia due to blood loss, acute  Comment: stable, no s/s of bleeding currently  Plan: CBCon 2/10, monitor VS, monitor for bleeding.     (I25.10) Coronary artery disease involving native coronary artery of native heart without angina pectoris  Comment: stable, asymptomatic  Plan: Continue medications as above, monitor and adjust PRN.    Orders:  1. Ok to use home vaporizing rub. Apply to chest,Nasal Passage QID PRN. Leave at bedside and ok to self administer  2. Senna S 1 tab PO QAM Dx: Constipation  3. Norco 1 tab PO QID (5/325mg) Dx: Pain. May 1 tab PRN norco and scheduled. Do not exceed 2 tabs in 4 hours  4.  Icy hot (30-10%) TID PRN to Right Knee. Ok to have at bedside and self administer  5.  BMP, CBC on 2/10 Dx: CKD, Animia    Total time spent with patient visit at the skilled nursing facility was 45 min including patient visit and review of past records. Greater than 50% of total time spent with counseling and coordinating care due to medication rec, discussion or plan of care, HPI    Electronically signed by:  ROSA Perera CNP

## 2018-02-09 ENCOUNTER — HOSPITAL LABORATORY (OUTPATIENT)
Facility: OTHER | Age: 83
End: 2018-02-09

## 2018-02-09 ENCOUNTER — NURSING HOME VISIT (OUTPATIENT)
Dept: GERIATRICS | Facility: CLINIC | Age: 83
End: 2018-02-09
Payer: COMMERCIAL

## 2018-02-09 VITALS
DIASTOLIC BLOOD PRESSURE: 61 MMHG | TEMPERATURE: 99 F | HEART RATE: 84 BPM | SYSTOLIC BLOOD PRESSURE: 132 MMHG | WEIGHT: 238.4 LBS | OXYGEN SATURATION: 94 % | RESPIRATION RATE: 20 BRPM | BODY MASS INDEX: 42.23 KG/M2

## 2018-02-09 DIAGNOSIS — Z96.641 STATUS POST TOTAL HIP REPLACEMENT, RIGHT: Primary | ICD-10-CM

## 2018-02-09 DIAGNOSIS — R53.81 PHYSICAL DECONDITIONING: ICD-10-CM

## 2018-02-09 DIAGNOSIS — K21.9 GASTROESOPHAGEAL REFLUX DISEASE WITHOUT ESOPHAGITIS: ICD-10-CM

## 2018-02-09 DIAGNOSIS — R11.0 NAUSEA: ICD-10-CM

## 2018-02-09 DIAGNOSIS — K59.01 SLOW TRANSIT CONSTIPATION: ICD-10-CM

## 2018-02-09 LAB
ANION GAP SERPL CALCULATED.3IONS-SCNC: 6 MMOL/L (ref 3–14)
BUN SERPL-MCNC: 14 MG/DL (ref 7–30)
CALCIUM SERPL-MCNC: 8.4 MG/DL (ref 8.5–10.1)
CHLORIDE SERPL-SCNC: 93 MMOL/L (ref 94–109)
CO2 SERPL-SCNC: 28 MMOL/L (ref 20–32)
CREAT SERPL-MCNC: 1.06 MG/DL (ref 0.52–1.04)
ERYTHROCYTE [DISTWIDTH] IN BLOOD BY AUTOMATED COUNT: 13.7 % (ref 10–15)
GFR SERPL CREATININE-BSD FRML MDRD: 50 ML/MIN/1.7M2
GLUCOSE SERPL-MCNC: 97 MG/DL (ref 70–99)
HCT VFR BLD AUTO: 31.1 % (ref 35–47)
HGB BLD-MCNC: 10.5 G/DL (ref 11.7–15.7)
MCH RBC QN AUTO: 32.6 PG (ref 26.5–33)
MCHC RBC AUTO-ENTMCNC: 33.8 G/DL (ref 31.5–36.5)
MCV RBC AUTO: 97 FL (ref 78–100)
PLATELET # BLD AUTO: 260 10E9/L (ref 150–450)
POTASSIUM SERPL-SCNC: 4.3 MMOL/L (ref 3.4–5.3)
RBC # BLD AUTO: 3.22 10E12/L (ref 3.8–5.2)
SODIUM SERPL-SCNC: 127 MMOL/L (ref 133–144)
WBC # BLD AUTO: 5.7 10E9/L (ref 4–11)

## 2018-02-09 PROCEDURE — 99309 SBSQ NF CARE MODERATE MDM 30: CPT | Performed by: NURSE PRACTITIONER

## 2018-02-09 RX ORDER — POLYETHYLENE GLYCOL 3350 17 G/17G
17 POWDER, FOR SOLUTION ORAL DAILY PRN
COMMUNITY
End: 2018-02-26

## 2018-02-09 NOTE — PROGRESS NOTES
[facility]    Chief Complaint   Patient presents with     Nursing Home Acute       HPI:    Elly Choe is a 82 year old  (1935), who is being [facility] today [facility]  HPI information obtained from: facility chart records, facility staff, patient report and [facility] chart review.    Last 3 BPs: 132/61, 160/73, 107/52  Admission Weight: 238.4lbs  Current Weight: 238.4lbs  Blood Sugar Range:   N/A    Today's concern is:  Status post total hip replacement, right  Physical deconditioning  Reports pain somewhat improved with scheduled Norco. She reports she has been moving better with therapy. She is on ASA 325mg x 42 days.    Gastroesophageal reflux disease without esophagitis  Nausea  Reports she has been having increased indigestion and belching with foul taste. Appetite has been poor. Does feel nauseated, but has not had any emesis. She does have history of GERD, but has not been on any medications recently.     Slow transit constipation  Reports she still has not had a good BM since admission to TCU, has not been passing much gas, does feel constipated. She is currently on senna-s 1 tab BID and 1 tab BID PRN, and PRN miralax, which she received a dose of today. She is requesting a suppository.       ALLERGIES: Sulfa drugs  Past Medical, Surgical, Family and Social History reviewed and [facility] in [facility].    Current Outpatient Prescriptions   Medication Sig Dispense Refill     Menthol, Topical Analgesic, 16 % GEL Externally apply topically 3 times daily       polyethylene glycol (MIRALAX/GLYCOLAX) powder Take 17 g by mouth daily as needed for constipation       senna-docusate (SENOKOT-S;PERICOLACE) 8.6-50 MG per tablet Take 1-2 tablets by mouth daily as needed for constipation 30 tablet 0     aspirin 325 MG tablet Take 1 tablet (325 mg) by mouth daily 40 tablet 0     HYDROcodone-acetaminophen (NORCO) 5-325 MG per tablet Take 1-2 tablets by  mouth every 4 hours as needed for moderate to severe pain 60 tablet 0     allopurinol (ZYLOPRIM) 100 MG tablet TAKE ONE TABLET BY MOUTH EVERY DAY 90 tablet 3     Acetaminophen (TYLENOL PO) Take 325 mg by mouth every 4 hours as needed for mild pain or fever       levothyroxine (SYNTHROID/LEVOTHROID) 75 MCG tablet Take 1 tablet (75 mcg) by mouth daily 90 tablet 3     multivitamin, therapeutic with minerals (MULTI-VITAMIN) TABS Take 1 tablet by mouth daily 100 tablet 3     Medications reviewed:  Medications reconciled to facility chart and changes were made to reflect current medications as identified as above med list. Below are the changes that were made:   Medications stopped since last EPIC medication reconciliation:   There are no discontinued medications.    Medications started since last [facility] medication reconciliation:  Orders Placed This Encounter   Medications     Menthol, Topical Analgesic, 16 % GEL     Sig: Externally apply topically 3 times daily     polyethylene glycol (MIRALAX/GLYCOLAX) powder     Sig: Take 17 g by mouth daily as needed for constipation         REVIEW OF SYSTEMS:  4 point ROS including Respiratory, CV, GI and , other than that noted in the HPI,  is negative    Physical Exam:  /61  Pulse 84  Temp 99  F (37.2  C)  Resp 20  Wt 238 lb 6.4 oz (108.1 kg)  LMP 01/14/2013  SpO2 94%  BMI 42.23 kg/m2  GENERAL APPEARANCE:  Alert, oriented, cooperative  RESP:  respiratory effort and palpation of chest normal, lungs clear to auscultation , no respiratory distress  CV:  Palpation and auscultation of heart done , regular rate and rhythm, no murmur, rub, or gallop, +2 pedal pulses, peripheral edema 1-2+ in RLE  ABDOMEN:  normal bowel sounds, soft, nontender, no hepatosplenomegaly or other masses, large, non-distended, non-tender, no guarding or rebound  M/S:   Gait and station abnormal decreased ROM to right hip and knee, moderate edema to right knee, no other gross joint  abnormalities  SKIN:  wound healing well, no signs of infection right hip incisions C/D/I, healing bruises, no erythema or drainage  NEURO:   Cranial nerves 2-12 are normal tested and grossly at patient's baseline, Examination of sensation by touch normal  PSYCH:  oriented X 3, normal insight, judgement and memory, affect and mood normal    Recent Labs:     CBC RESULTS:   Recent Labs   Lab Test  02/09/18   0746  02/02/18   0749   01/31/18   1349   WBC  5.7   --    --   5.0   RBC  3.22*   --    --   4.43   HGB  10.5*  10.7*   < >  14.4   HCT  31.1*   --    --   41.4   MCV  97   --    --   94   MCH  32.6   --    --   32.5   MCHC  33.8   --    --   34.8   RDW  13.7   --    --   12.9   PLT  260   --    --   159    < > = values in this interval not displayed.       Last Basic Metabolic Panel:  Recent Labs   Lab Test  02/09/18   0746  02/02/18   0749   01/26/18   1116   NA  127*   --    --   132*   POTASSIUM  4.3   --    --   4.4   CHLORIDE  93*   --    --   98   JANET  8.4*   --    --   9.0   CO2  28   --    --   29   BUN  14   --    --   18   CR  1.06*   --    --   1.24*   GLC  97  77   < >  96    < > = values in this interval not displayed.       Liver Function Studies -   Recent Labs   Lab Test  12/19/17   1213  10/07/16   1501   PROTTOTAL  6.9  7.2   ALBUMIN  3.8  4.0   BILITOTAL  0.6  0.4   ALKPHOS  84  104   AST  20  22   ALT  20  23       TSH   Date Value Ref Range Status   12/19/2017 0.62 0.40 - 4.00 mU/L Final   06/01/2017 0.67 0.40 - 4.00 mU/L Final   ]    Lab Results   Component Value Date    A1C 5.6 02/11/2013    A1C 5.7 01/08/2007         Assessment/Plan:  (Z96.641) Status post total hip replacement, right  (primary encounter diagnosis)  (R53.81) Physical deconditioning  Comment: pain controlled, no s/s of infection, healing well  Plan: continue current pain regimen, continue ASA, PT/OT, f/u with ortho as scheduled, Ice to right hip PRN    (K21.9) Gastroesophageal reflux disease without esophagitis  (R11.0)  Nausea  Comment: suspect symptoms r/t GERD, constipation, increase ASA dose may be contributing, abdomen in non-acute.   Plan: Add Zantac qhs, PRN Zofran and Tums, continue to monitor and adjustt PRN.     (K59.01) Slow transit constipation  Comment: continues to be symptomatic, likely contributing to GERD, nausea  Plan: increase senna-s to 2 tabs BID, PRN suppository - give today. If no response give enema. Continue to monitor and adjust PRN.     Orders:  1. Change BMP and CBC to 2/12  2. Give bisacodyl 10mg suppository rectally x1 today and q day PRN DX: constipation  3. DC PRN senna, start senna-s 2 tab PO BID   DX: constipation  4. Zantac 150mg PO qhs. Give dose tonight DX GERD  5. Tums 500mg tab- give 2 tabs PO x 1 now and TID PRN DX: indigestion  6. If no results with suppository give fleets enema x1 DX: constipation  7. Zofran ODT 4mg PO q 8hr PRN DX: nausea        Electronically signed by  ROSA Perera CNP

## 2018-02-12 ENCOUNTER — DISCHARGE SUMMARY NURSING HOME (OUTPATIENT)
Dept: GERIATRICS | Facility: CLINIC | Age: 83
End: 2018-02-12
Payer: COMMERCIAL

## 2018-02-12 ENCOUNTER — HOSPITAL LABORATORY (OUTPATIENT)
Facility: OTHER | Age: 83
End: 2018-02-12

## 2018-02-12 VITALS
SYSTOLIC BLOOD PRESSURE: 127 MMHG | OXYGEN SATURATION: 94 % | RESPIRATION RATE: 20 BRPM | TEMPERATURE: 97.2 F | BODY MASS INDEX: 42.16 KG/M2 | WEIGHT: 238 LBS | DIASTOLIC BLOOD PRESSURE: 64 MMHG | HEART RATE: 71 BPM

## 2018-02-12 DIAGNOSIS — Z96.641 STATUS POST TOTAL HIP REPLACEMENT, RIGHT: Primary | ICD-10-CM

## 2018-02-12 DIAGNOSIS — K59.01 SLOW TRANSIT CONSTIPATION: ICD-10-CM

## 2018-02-12 DIAGNOSIS — R53.81 PHYSICAL DECONDITIONING: ICD-10-CM

## 2018-02-12 DIAGNOSIS — K21.9 GASTROESOPHAGEAL REFLUX DISEASE WITHOUT ESOPHAGITIS: ICD-10-CM

## 2018-02-12 DIAGNOSIS — Z75.8 DISCHARGE PLANNING ISSUES: ICD-10-CM

## 2018-02-12 DIAGNOSIS — D62 ANEMIA DUE TO BLOOD LOSS, ACUTE: ICD-10-CM

## 2018-02-12 DIAGNOSIS — R35.0 URINARY FREQUENCY: ICD-10-CM

## 2018-02-12 DIAGNOSIS — E87.1 HYPONATREMIA: ICD-10-CM

## 2018-02-12 LAB
ANION GAP SERPL CALCULATED.3IONS-SCNC: 9 MMOL/L (ref 3–14)
BASOPHILS # BLD AUTO: 0 10E9/L (ref 0–0.2)
BASOPHILS NFR BLD AUTO: 0.5 %
BUN SERPL-MCNC: 10 MG/DL (ref 7–30)
CALCIUM SERPL-MCNC: 8.5 MG/DL (ref 8.5–10.1)
CHLORIDE SERPL-SCNC: 94 MMOL/L (ref 94–109)
CO2 SERPL-SCNC: 26 MMOL/L (ref 20–32)
CREAT SERPL-MCNC: 1.03 MG/DL (ref 0.52–1.04)
DIFFERENTIAL METHOD BLD: ABNORMAL
EOSINOPHIL # BLD AUTO: 0.3 10E9/L (ref 0–0.7)
EOSINOPHIL NFR BLD AUTO: 5.2 %
ERYTHROCYTE [DISTWIDTH] IN BLOOD BY AUTOMATED COUNT: 13.6 % (ref 10–15)
GFR SERPL CREATININE-BSD FRML MDRD: 51 ML/MIN/1.7M2
GLUCOSE SERPL-MCNC: 79 MG/DL (ref 70–99)
HCT VFR BLD AUTO: 29.7 % (ref 35–47)
HGB BLD-MCNC: 10 G/DL (ref 11.7–15.7)
IMM GRANULOCYTES # BLD: 0.1 10E9/L (ref 0–0.4)
IMM GRANULOCYTES NFR BLD: 1 %
LYMPHOCYTES # BLD AUTO: 1.2 10E9/L (ref 0.8–5.3)
LYMPHOCYTES NFR BLD AUTO: 19.8 %
MCH RBC QN AUTO: 32.6 PG (ref 26.5–33)
MCHC RBC AUTO-ENTMCNC: 33.7 G/DL (ref 31.5–36.5)
MCV RBC AUTO: 97 FL (ref 78–100)
MONOCYTES # BLD AUTO: 0.4 10E9/L (ref 0–1.3)
MONOCYTES NFR BLD AUTO: 6.4 %
NEUTROPHILS # BLD AUTO: 3.9 10E9/L (ref 1.6–8.3)
NEUTROPHILS NFR BLD AUTO: 67.1 %
PLATELET # BLD AUTO: 224 10E9/L (ref 150–450)
POTASSIUM SERPL-SCNC: 4.3 MMOL/L (ref 3.4–5.3)
RBC # BLD AUTO: 3.07 10E12/L (ref 3.8–5.2)
SODIUM SERPL-SCNC: 129 MMOL/L (ref 133–144)
WBC # BLD AUTO: 5.8 10E9/L (ref 4–11)

## 2018-02-12 PROCEDURE — 99310 SBSQ NF CARE HIGH MDM 45: CPT | Performed by: NURSE PRACTITIONER

## 2018-02-12 RX ORDER — CALCIUM CARBONATE 500 MG/1
2 TABLET, CHEWABLE ORAL 3 TIMES DAILY PRN
COMMUNITY
End: 2018-02-19

## 2018-02-12 RX ORDER — BISACODYL 10 MG
10 SUPPOSITORY, RECTAL RECTAL DAILY
COMMUNITY
End: 2018-02-19

## 2018-02-12 RX ORDER — HYDROCODONE BITARTRATE AND ACETAMINOPHEN 5; 325 MG/1; MG/1
TABLET ORAL
COMMUNITY
End: 2018-02-19 | Stop reason: DRUGHIGH

## 2018-02-12 RX ORDER — AMOXICILLIN 250 MG
2 CAPSULE ORAL 2 TIMES DAILY
COMMUNITY
End: 2018-02-26

## 2018-02-12 NOTE — PROGRESS NOTES
[facility]    Chief Complaint   Patient presents with     RECHECK       HPI:    Elly Choe is a 82 year old  (1935), who is being seen today for an episodic care visit at [facility].      HPI information obtained from: facility chart records, facility staff, patient report and [facility] chart review.    Today's concern is:  Status post total hip replacement, right  Physical deconditioning  Continues to report some intermittent pain, but feels pain mostly controlled on current regimen of Norco 1 tab QID and 1 tab q4hr PRN. She has been working with PT and OT, has had improvement in mobility. She is anticoagulated on ASA 325mg x 42 days. .     Hyponatremia  Baseline sodium appears to be low 130's. She was as low as 127 last week, improved to 129 on recheck today.     Gastroesophageal reflux disease without esophagitis  Was started on Zantac qhs and PRN tums last week d/t reports of nausea, excessive burping and indigestion. Reports she has not noticed and recent burping, no nausea. Reports appetite somewhat improved.     Slow transit constipation  Reports she has a small BM yesterday, two small BM's today. Does report some bloating. She is currently on senna-s 2 tabs BID and PRN miralax (was given x1 scheduled dose last Friday with good results).     Anemia due to blood loss, acute  Hgb 14.4 - >10.7. Recheck in TCU today stable at 10. No melena or hematuria noted.     Urinary frequency  Reports she had urinary urgency this AM and did have some urinary incontinence on the way to the bathroom. Does think she has had some dysuria over the weekend, none to day. Reports she is using the bathroom more frequently than baseline. She states she has a history of multiple UTI's and is concerned she is getting one. She is afebrile, no low back pain.     Discharge planning issues  After provider's initial visit, patient told therapy she wanted to go home today and did not want to  "participate in therapy any more. Provider had second visit with patient and discussed concerns with mobility and benefits of continued therapy, and patient agreed to participate in the therapy today and discharge tomorrow. Provider discussed mobility concerns with therapy, who felt she was not following hip precautions with any consistency and gait remained on unsteady; patient had been repeatedly found trying to walk without walker in her room. SLUM 25/30. After discussion with social work, patient agreed to continue therapy in TCU, requested that therapy \"go more slowly.\"      ALLERGIES: Sulfa drugs  Past Medical, Surgical, Family and Social History reviewed and updated in [facility].    Current Outpatient Prescriptions   Medication Sig Dispense Refill     HYDROcodone-acetaminophen (NORCO) 5-325 MG per tablet Give 1 tablet by mouth four times a day for Pain may have 1 tab of prn Norco with scheduled, not to exceed 2 tabs in 4hrs       bisacodyl (DULCOLAX) 10 MG Suppository Place 10 mg rectally daily Also daily as needed       senna-docusate (SENOKOT-S;PERICOLACE) 8.6-50 MG per tablet Take 2 tablets by mouth 2 times daily       RaNITidine HCl (ZANTAC PO) Take 150 mg by mouth At Bedtime       calcium carbonate (TUMS) 500 MG chewable tablet Take 2 chew tab by mouth 3 times daily as needed for heartburn       Ondansetron HCl (ZOFRAN PO) Take 4 mg by mouth every 8 hours as needed for nausea or vomiting       Menthol, Topical Analgesic, 16 % GEL Externally apply topically 3 times daily       polyethylene glycol (MIRALAX/GLYCOLAX) powder Take 17 g by mouth daily as needed for constipation       aspirin 325 MG tablet Take 1 tablet (325 mg) by mouth daily 40 tablet 0     HYDROcodone-acetaminophen (NORCO) 5-325 MG per tablet Take 1-2 tablets by mouth every 4 hours as needed for moderate to severe pain 60 tablet 0     allopurinol (ZYLOPRIM) 100 MG tablet TAKE ONE TABLET BY MOUTH EVERY DAY 90 tablet 3     Acetaminophen (TYLENOL PO) " Take 325 mg by mouth every 4 hours as needed for mild pain or fever       levothyroxine (SYNTHROID/LEVOTHROID) 75 MCG tablet Take 1 tablet (75 mcg) by mouth daily 90 tablet 3     multivitamin, therapeutic with minerals (MULTI-VITAMIN) TABS Take 1 tablet by mouth daily 100 tablet 3     Medications reconciled to facility chart and changes were made to reflect current medications as identified as above med list. Below are the changes that were made:   Medications stopped since last EPIC medication reconciliation:   Medications Discontinued During This Encounter   Medication Reason     senna-docusate (SENOKOT-S;PERICOLACE) 8.6-50 MG per tablet Dose adjustment       Medications started since last [facility] medication reconciliation:  Orders Placed This Encounter   Medications     HYDROcodone-acetaminophen (NORCO) 5-325 MG per tablet     Sig: Give 1 tablet by mouth four times a day for Pain may have 1 tab of prn Norco with scheduled, not to exceed 2 tabs in 4hrs     bisacodyl (DULCOLAX) 10 MG Suppository     Sig: Place 10 mg rectally daily Also daily as needed     senna-docusate (SENOKOT-S;PERICOLACE) 8.6-50 MG per tablet     Sig: Take 2 tablets by mouth 2 times daily     RaNITidine HCl (ZANTAC PO)     Sig: Take 150 mg by mouth At Bedtime     calcium carbonate (TUMS) 500 MG chewable tablet     Sig: Take 2 chew tab by mouth 3 times daily as needed for heartburn     Ondansetron HCl (ZOFRAN PO)     Sig: Take 4 mg by mouth every 8 hours as needed for nausea or vomiting           REVIEW OF SYSTEMS:  4 point ROS including Respiratory, CV, GI and , other than that noted in the HPI,  is negative    Physical Exam:  /64  Pulse 71  Temp 97.2  F (36.2  C)  Resp 20  Wt 238 lb (108 kg)  LMP 01/14/2013  SpO2 94%  BMI 42.16 kg/m2  GENERAL APPEARANCE:  Alert, oriented, cooperative  RESP:  respiratory effort and palpation of chest normal, lungs clear to auscultation , no respiratory distress  CV:  Palpation and auscultation of  heart done , regular rate and rhythm, no murmur, rub, or gallop, +2 pedal pulses, peripheral edema 1+ in right ankle  ABDOMEN:  normal bowel sounds, soft, nontender, no hepatosplenomegaly or other masses, large, round, no guarding or rebound  M/S:   Gait and station abnormal right hip tender, decreased ROM to right hip surgical scar to bilat knee  SKIN:  wound healing well, no signs of infection right hip incision C/D/I, bruising present to right hip and thigh  NEURO:   Cranial nerves 2-12 are normal tested and grossly at patient's baseline, Examination of sensation by touch normal  PSYCH:  oriented X 3, normal insight, judgement and memory, labile - calm and friendly but also anxious and crying at times    Recent Labs:     CBC RESULTS:   Recent Labs   Lab Test  02/12/18   0750  02/09/18   0746   WBC  5.8  5.7   RBC  3.07*  3.22*   HGB  10.0*  10.5*   HCT  29.7*  31.1*   MCV  97  97   MCH  32.6  32.6   MCHC  33.7  33.8   RDW  13.6  13.7   PLT  224  260       Last Basic Metabolic Panel:  Recent Labs   Lab Test  02/12/18   0750  02/09/18   0746   NA  129*  127*   POTASSIUM  4.3  4.3   CHLORIDE  94  93*   JANET  8.5  8.4*   CO2  26  28   BUN  10  14   CR  1.03  1.06*   GLC  79  97       Liver Function Studies -   Recent Labs   Lab Test  12/19/17   1213  10/07/16   1501   PROTTOTAL  6.9  7.2   ALBUMIN  3.8  4.0   BILITOTAL  0.6  0.4   ALKPHOS  84  104   AST  20  22   ALT  20  23       TSH   Date Value Ref Range Status   12/19/2017 0.62 0.40 - 4.00 mU/L Final   06/01/2017 0.67 0.40 - 4.00 mU/L Final     Lab Results   Component Value Date    INR 0.99 01/31/2018       Lab Results   Component Value Date    A1C 5.6 02/11/2013    A1C 5.7 01/08/2007         Assessment/Plan:  (Z96.641) Status post total hip replacement, right  (primary encounter diagnosis)  (R53.81) Physical deconditioning  Comment: Pain fairly well controlled, incisions healing well, no s/s of infection  Plan: continue current pain regimen, promote use of PRN  ice, ASA as above, PT/OT, f/u with ortho as scheduled    (E87.1) Hyponatremia  Comment: near baseline, no obvious offending drugs noted, suspect r/t to 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   Plan: Will recheck BMP 2/16; sending UA/UC to assess for possible UTI    (K21.9) Gastroesophageal reflux disease without esophagitis  Comment: improved, no symptoms noted currently  Plan: continue zantac qhs, continue diet as tolerated, PRN tums, monitor and adjust PRN    (K59.01) Slow transit constipation  Comment: improving  Plan: continue current bowel regimen, monitor and adjust PRN    (D62) Anemia due to blood loss, acute  Comment: stable no s/s of bleeding.   Plan: Hgb PRN, monitor VS    (R35.0) Urinary frequency  Comment: symptoms somewhat vague, but consistent with possible UTI, mild SIADH could also be contributing  Plan: UA/UC    (Z02.9) Discharge planning issues  Comment: Now agreeable to stay, suspect will need frequent encouragement to continue to participate with therapies.   Plan: Continue therapies for now, continue to monitor and re-assess discharge needs/readiness,  to follow.     Orders:  1. UA/UC    Total time spent with patient visit at the skilled nursing facility was 40 including patient visit and review of past records. Greater than 50% of total time spent with counseling and coordinating care due to multiple visits with patient, care coordination with SW, PT/OT to discuss concerns abotu safety at home as patient requested discharge, discussion of current plan of care with patient    Electronically signed by  ROSA Perera CNP

## 2018-02-13 ENCOUNTER — HOSPITAL LABORATORY (OUTPATIENT)
Facility: OTHER | Age: 83
End: 2018-02-13

## 2018-02-14 ENCOUNTER — TRANSFERRED RECORDS (OUTPATIENT)
Dept: HEALTH INFORMATION MANAGEMENT | Facility: CLINIC | Age: 83
End: 2018-02-14

## 2018-02-14 ENCOUNTER — NURSING HOME VISIT (OUTPATIENT)
Dept: GERIATRICS | Facility: CLINIC | Age: 83
End: 2018-02-14
Payer: COMMERCIAL

## 2018-02-14 VITALS
HEART RATE: 74 BPM | TEMPERATURE: 98.6 F | WEIGHT: 238 LBS | OXYGEN SATURATION: 94 % | BODY MASS INDEX: 42.17 KG/M2 | HEIGHT: 63 IN | RESPIRATION RATE: 18 BRPM | DIASTOLIC BLOOD PRESSURE: 75 MMHG | SYSTOLIC BLOOD PRESSURE: 156 MMHG

## 2018-02-14 DIAGNOSIS — R53.81 PHYSICAL DECONDITIONING: ICD-10-CM

## 2018-02-14 DIAGNOSIS — R35.0 URINARY FREQUENCY: ICD-10-CM

## 2018-02-14 DIAGNOSIS — K21.9 GASTROESOPHAGEAL REFLUX DISEASE WITHOUT ESOPHAGITIS: ICD-10-CM

## 2018-02-14 DIAGNOSIS — Z96.641 STATUS POST TOTAL HIP REPLACEMENT, RIGHT: Primary | ICD-10-CM

## 2018-02-14 PROCEDURE — 99309 SBSQ NF CARE MODERATE MDM 30: CPT | Performed by: NURSE PRACTITIONER

## 2018-02-14 NOTE — PROGRESS NOTES
[facility]    Chief Complaint   Patient presents with     RECHECK       HPI:    Elly Choe is a 82 year old  (1935), who is being seen today for an episodic care visit at [facility].  HPI information obtained from: facility chart records, facility staff, patient report, [facility] chart review and Care Everywhere [facility] chart review.Today's concern is:  Status post total hip replacement, right  Physical deconditioning  Reports she is feeling better today. Had ortho apt this AM and was happy to hear she is healing well, Prineo tape was removed. She reports pain, no able to give it a number, worse with activity, mild to none when at rest. She is currently on scheduled Norco QID and PRN. She is anticoagulated on ASA 325mg x 42 days. She does admit she continues to argue with therapy at times, but has been more participative.     Gastroesophageal reflux disease without esophagitis  Reports no further nausea, indigestion and burping resolved. Appetite is slowly improving. She is currently on Zantac 150mg PO qhs. Is having regular bowel movements.     Urinary frequency  Had urinary incontinence and ?urgency/dyuria earlier this week. UA was sent, but results not back yet. She has been afebrile. Denies any further urinary symptoms, no low back pain, states she has had no further incontinence.        BP Readings from Last 3 Encounters:   02/14/18 156/75   02/12/18 127/64   02/09/18 132/61   Admission weight (02/05): 238.4lbs  Current weight (as of 02/12): 238lbs      ALLERGIES: Sulfa drugs  Past Medical, Surgical, Family and Social History reviewed and updated in [facility].    Current Outpatient Prescriptions   Medication Sig Dispense Refill     aspirin 81 MG tablet Take 81 mg by mouth daily       HYDROcodone-acetaminophen (NORCO) 5-325 MG per tablet Give 1 tablet by mouth four times a day for Pain may have 1 tab of prn Norco with scheduled, not to exceed 2 tabs in 4hrs        bisacodyl (DULCOLAX) 10 MG Suppository Place 10 mg rectally daily Also daily as needed       senna-docusate (SENOKOT-S;PERICOLACE) 8.6-50 MG per tablet Take 2 tablets by mouth 2 times daily       RaNITidine HCl (ZANTAC PO) Take 150 mg by mouth At Bedtime       calcium carbonate (TUMS) 500 MG chewable tablet Take 2 chew tab by mouth 3 times daily as needed for heartburn       Ondansetron HCl (ZOFRAN PO) Take 4 mg by mouth every 8 hours as needed for nausea or vomiting       Menthol, Topical Analgesic, 16 % GEL Externally apply topically 3 times daily       polyethylene glycol (MIRALAX/GLYCOLAX) powder Take 17 g by mouth daily as needed for constipation       aspirin 325 MG tablet Take 1 tablet (325 mg) by mouth daily 40 tablet 0     HYDROcodone-acetaminophen (NORCO) 5-325 MG per tablet Take 1-2 tablets by mouth every 4 hours as needed for moderate to severe pain 60 tablet 0     allopurinol (ZYLOPRIM) 100 MG tablet TAKE ONE TABLET BY MOUTH EVERY DAY 90 tablet 3     Acetaminophen (TYLENOL PO) Take 325 mg by mouth every 4 hours as needed for mild pain or fever       levothyroxine (SYNTHROID/LEVOTHROID) 75 MCG tablet Take 1 tablet (75 mcg) by mouth daily 90 tablet 3     multivitamin, therapeutic with minerals (MULTI-VITAMIN) TABS Take 1 tablet by mouth daily 100 tablet 3     Medications reviewed:  Medications reconciled to facility chart and changes were made to reflect current medications as identified as above med list. Below are the changes that were made:   Medications stopped since last EPIC medication reconciliation:   There are no discontinued medications.    Medications started since last [facility] medication reconciliation:  Orders Placed This Encounter   Medications     aspirin 81 MG tablet     Sig: Take 81 mg by mouth daily         REVIEW OF SYSTEMS:  10 point ROS of systems including Constitutional, Eyes, Respiratory, Cardiovascular, Gastroenterology, Genitourinary, Integumentary, Muscularskeletal, Psychiatric  "were all negative except for pertinent positives noted in my HPI.    Physical Exam:  /75  Pulse 74  Temp 98.6  F (37  C)  Resp 18  Ht 5' 3\" (1.6 m)  Wt 238 lb (108 kg)  LMP 01/14/2013  SpO2 94%  BMI 42.16 kg/m2  GENERAL APPEARANCE:  Alert, in no distress, oriented, morbidly obese, cooperative  RESP:  respiratory effort and palpation of chest normal, lungs clear to auscultation , no respiratory distress, on RA  CV:  Palpation and auscultation of heart done , regular rate and rhythm, no murmur, rub, or gallop, +2 pedal pulses, peripheral edema 1-2+ in BLE, apical pulse 84  ABDOMEN:  normal bowel sounds, soft, nontender, no hepatosplenomegaly or other masses, obese, no guarding or rebound  M/S:   Gait and station abnormal decreased ROM to right hip, mild tenderness, decreased ROM to left shoulder, no gross abnormalities  SKIN:  Inspection of skin and subcutaneous tissue baseline, Palpation of skin and subcutaneous tissue baseline, wound healing well, no signs of infection left hip incision C/D/I  NEURO:   Cranial nerves 2-12 are normal tested and grossly at patient's baseline, Examination of sensation by touch normal  PSYCH:  oriented X 3, normal insight, judgement and memory, affect and mood normal    Recent Labs:     CBC RESULTS:   Recent Labs   Lab Test  02/12/18   0750  02/09/18   0746   WBC  5.8  5.7   RBC  3.07*  3.22*   HGB  10.0*  10.5*   HCT  29.7*  31.1*   MCV  97  97   MCH  32.6  32.6   MCHC  33.7  33.8   RDW  13.6  13.7   PLT  224  260       Last Basic Metabolic Panel:  Recent Labs   Lab Test  02/12/18   0750  02/09/18   0746   NA  129*  127*   POTASSIUM  4.3  4.3   CHLORIDE  94  93*   JANET  8.5  8.4*   CO2  26  28   BUN  10  14   CR  1.03  1.06*   GLC  79  97       Liver Function Studies -   Recent Labs   Lab Test  12/19/17   1213  10/07/16   1501   PROTTOTAL  6.9  7.2   ALBUMIN  3.8  4.0   BILITOTAL  0.6  0.4   ALKPHOS  84  104   AST  20  22   ALT  20  23       TSH   Date Value Ref Range " Status   12/19/2017 0.62 0.40 - 4.00 mU/L Final   06/01/2017 0.67 0.40 - 4.00 mU/L Final         Assessment/Plan:  (Z96.641) Status post total hip replacement, right  (primary encounter diagnosis)  (R53.81) Physical deconditioning  Comment: healing, no s/s of infection, pain controlled on current regimen, no s/s of DVT  Plan: continue medications as above. PT/OT - staff to re-approach and attempt to meet patient requests as able, ice PRN, f/u with ortho as scheduled    (K21.9) Gastroesophageal reflux disease without esophagitis  Comment: improved, tolerating diet, no further symptoms  Plan: continue zantac, monitor and adjust PRN    (R35.0) Urinary frequency  Comment: resolved, likely r/t decreased mobility, narcotic pain medication,   Plan: staff to f/u on UA results with labs, continue to monitor and adjust PRN.     Orders:  No new orders        Electronically signed by  ROSA Perera CNP

## 2018-02-15 ENCOUNTER — NURSING HOME VISIT (OUTPATIENT)
Dept: GERIATRICS | Facility: CLINIC | Age: 83
End: 2018-02-15
Payer: COMMERCIAL

## 2018-02-15 VITALS
DIASTOLIC BLOOD PRESSURE: 82 MMHG | TEMPERATURE: 98.2 F | OXYGEN SATURATION: 94 % | RESPIRATION RATE: 20 BRPM | WEIGHT: 238 LBS | HEART RATE: 71 BPM | BODY MASS INDEX: 42.16 KG/M2 | SYSTOLIC BLOOD PRESSURE: 136 MMHG

## 2018-02-15 DIAGNOSIS — E03.9 HYPOTHYROIDISM, UNSPECIFIED TYPE: ICD-10-CM

## 2018-02-15 DIAGNOSIS — D62 ACUTE BLOOD LOSS ANEMIA: ICD-10-CM

## 2018-02-15 DIAGNOSIS — M10.9 GOUT, UNSPECIFIED CAUSE, UNSPECIFIED CHRONICITY, UNSPECIFIED SITE: ICD-10-CM

## 2018-02-15 DIAGNOSIS — Z96.641 AFTERCARE FOLLOWING RIGHT HIP JOINT REPLACEMENT SURGERY: Primary | ICD-10-CM

## 2018-02-15 DIAGNOSIS — K59.03 DRUG INDUCED CONSTIPATION: ICD-10-CM

## 2018-02-15 DIAGNOSIS — I25.10 CORONARY ARTERY DISEASE INVOLVING NATIVE CORONARY ARTERY OF NATIVE HEART WITHOUT ANGINA PECTORIS: ICD-10-CM

## 2018-02-15 DIAGNOSIS — Z47.1 AFTERCARE FOLLOWING RIGHT HIP JOINT REPLACEMENT SURGERY: Primary | ICD-10-CM

## 2018-02-15 DIAGNOSIS — Z96.641 STATUS POST TOTAL REPLACEMENT OF RIGHT HIP: ICD-10-CM

## 2018-02-15 LAB
ALBUMIN UR-MCNC: NEGATIVE MG/DL
APPEARANCE UR: CLEAR
BILIRUB UR QL STRIP: NEGATIVE
COLOR UR AUTO: YELLOW
GLUCOSE UR STRIP-MCNC: NEGATIVE MG/DL
HGB UR QL STRIP: NEGATIVE
KETONES UR STRIP-MCNC: NEGATIVE MG/DL
LEUKOCYTE ESTERASE UR QL STRIP: NEGATIVE
MUCOUS THREADS #/AREA URNS LPF: PRESENT /LPF
NITRATE UR QL: NEGATIVE
PH UR STRIP: 7 PH (ref 5–7)
RBC #/AREA URNS AUTO: <1 /HPF (ref 0–2)
SOURCE: ABNORMAL
SP GR UR STRIP: 1.01 (ref 1–1.03)
SQUAMOUS #/AREA URNS AUTO: 1 /HPF (ref 0–1)
UROBILINOGEN UR STRIP-MCNC: 0 MG/DL (ref 0–2)
WBC #/AREA URNS AUTO: 1 /HPF (ref 0–2)

## 2018-02-15 PROCEDURE — 99305 1ST NF CARE MODERATE MDM 35: CPT | Performed by: INTERNAL MEDICINE

## 2018-02-15 NOTE — PROGRESS NOTES
[facility]  INITIAL VISIT NOTE  February 15, 2018    PRIMARY CARE PROVIDER AND CLINIC:  Joy Kam [address] [place] [id number]    Chief Complaint   Patient presents with     Hospital F/U       HPI:    Elly Choe is a 82 year old  (1935) female [facility]. Medical history is notable for CAD, gout, hypothyroidism and CKD. She was hospitalized at [facility] from 1/31/18 to 2/3/18 where she is s/p elective R total hip arthroplasty. Surgery without complication. Post-op with acute blood loss anemia (Hgb 14.4 --> 10). She was admitted to this facility for medical management and rehab.     Today, Geena Choe is seen in her room. Notes her R hip has been more painful than her bilateral knee and bilateral shoulder replacements. No chest pain or dyspnea. No abdominal pain. Working with therapies.     CODE STATUS:   CPR/Full code     ALLERGIES:     Allergies   Allergen Reactions     Sulfa Drugs Other (See Comments)     Causes burning when urinates. Causes yeast infections.       PAST MEDICAL HISTORY:   Past Medical History:   Diagnosis Date     ACS (acute coronary syndrome) (H) 4/14/2014     Acute dermatitis due to solar radiation 9/9/2010     Anemia 12/6/2012     Diverticulitis of colon 5/17/2005     HX OF COMPOUND HEMANGIOMA NOS [228.00]      HX OF SUPERFICIAL PHLEBITIS      Phlebitis and thrombophlebitis of superficial vessels of lower extremities 10/2/2012     S/P knee replacement left 1/2/2013     S/P PTCA (percutaneous transluminal coronary angioplasty) 6/18/2014     Status post shoulder joint replacement 10/5/2012     Status post total shoulder arthroplasty, left 5/4/2016       PAST SURGICAL HISTORY:   Past Surgical History:   Procedure Laterality Date     ARTHROPLASTY HIP Right 1/31/2018    Procedure: ARTHROPLASTY HIP;  Right total hip arthroplasty. ;  Surgeon: Sen Parry MD;   Location: [facility] OR     ARTHROPLASTY KNEE  4/13/2011    Procedure:ARTHROPLASTY KNEE; Surgeon:SEN PARRY; Location:[facility]     ARTHROPLASTY KNEE  12/26/2012    Procedure: ARTHROPLASTY KNEE;  Left Total Knee Arthroplasty;  Surgeon: Sen Parry MD;  Location: [facility]     ARTHROPLASTY SHOULDER  9/26/2012    Procedure: ARTHROPLASTY SHOULDER;  Right total shoulder arthroplasty;  Surgeon: Sen Parry MD;  Location: [facility]     ARTHROPLASTY SHOULDER Left 5/4/2016    Procedure: ARTHROPLASTY SHOULDER;  Surgeon: Sen Parry MD;  Location: [facility] OR     ARTHROSCOPY KNEE IRRIGATION AND DEBRIDEMENT  12/10/2010    ARTHROSCOPY KNEE IRRIGATION AND DEBRIDEMENT performed by LEY, JEFFREY DUANE at [facility] OR     CATARACT IOL, RT/LT  4/2010    bilateral     COLONOSCOPY  2002     COLONOSCOPY  6/19/2014    Procedure: COLONOSCOPY;  Surgeon: Steve Deng MD;  Location: [facility] GI     CORONARY ANGIOGRAPHY ADULT ORDER       HYSTERECTOMY, SARAH  1976    Total abdominal hysterectomy & bilateral salpingectomy oophorectomy     TONSILLECTOMY & ADENOIDECTOMY      as child       FAMILY HISTORY:   Family History   Problem Relation Age of Onset     Alcohol/Drug Father      DIABETES Brother      DIABETES Brother      Arthritis Mother 20     Other - See Comments Daughter      Fallopian tube infection 06/2015         SOCIAL HISTORY:   Lives alone     MEDICATIONS:  Current Outpatient Prescriptions   Medication Sig Dispense Refill     aspirin 81 MG tablet Take 81 mg by mouth daily       HYDROcodone-acetaminophen (NORCO) 5-325 MG per tablet Give 1 tablet by mouth four times a day for Pain may have 1 tab of prn Norco with scheduled, not to exceed 2 tabs in 4hrs       bisacodyl (DULCOLAX) 10 MG Suppository Place 10 mg rectally daily Also daily as needed       senna-docusate (SENOKOT-S;PERICOLACE) 8.6-50 MG per tablet Take 2 tablets by mouth 2 times daily       RaNITidine HCl (ZANTAC PO) Take 150 mg by mouth At Bedtime       calcium carbonate (TUMS) 500 MG  chewable tablet Take 2 chew tab by mouth 3 times daily as needed for heartburn       Ondansetron HCl (ZOFRAN PO) Take 4 mg by mouth every 8 hours as needed for nausea or vomiting       Menthol, Topical Analgesic, 16 % GEL Externally apply topically 3 times daily       polyethylene glycol (MIRALAX/GLYCOLAX) powder Take 17 g by mouth daily as needed for constipation       aspirin 325 MG tablet Take 1 tablet (325 mg) by mouth daily 40 tablet 0     HYDROcodone-acetaminophen (NORCO) 5-325 MG per tablet Take 1-2 tablets by mouth every 4 hours as needed for moderate to severe pain 60 tablet 0     allopurinol (ZYLOPRIM) 100 MG tablet TAKE ONE TABLET BY MOUTH EVERY DAY 90 tablet 3     Acetaminophen (TYLENOL PO) Take 325 mg by mouth every 4 hours as needed for mild pain or fever       levothyroxine (SYNTHROID/LEVOTHROID) 75 MCG tablet Take 1 tablet (75 mcg) by mouth daily 90 tablet 3     multivitamin, therapeutic with minerals (MULTI-VITAMIN) TABS Take 1 tablet by mouth daily 100 tablet 3       Post Discharge Medication Reconciliation Status: medication reconcilation previously completed during another office visit.    ROS:  10 point ROS neg other than the symptoms noted above in the HPI.    PHYSICAL EXAM:  /82  Pulse 71  Temp 98.2  F (36.8  C)  Resp 20  Wt 238 lb (108 kg)  LMP 01/14/2013  SpO2 94%  BMI 42.16 kg/m2  Gen: laying in bed, alert, cooperative and in no acute distress  HEENT: normocephalic; oropharynx clear  Card: RRR, S1, S2, no murmurs  Resp: lungs clear to auscultation bilaterally  GI: abdomen soft, not-tender  MSK: normal muscle tone, no LE edema  Neuro: CX II-XII grossly in tact; ROM in all four extremities grossly in tact  Psych: alert and oriented x3; normal affect    LABORATORY/IMAGING DATA:  Reviewed as per Epic    ASSESSMENT/PLAN:    S/p Right Total Hip Arthroplasty for DJD (1/31/18)  Surgery without complication.   -- analgesia with Norco 5-325 mg QID and q4h PRN  -- DVT prophylaxis with ASA  325 mg daily per ortho  -- ongoing PT/OT  -- follow up with ortho as scheduled    Acute Blood Loss Anemia  Secondary to above. No evidence of ongoing bleeding. Hgb 14.4 --> 10  -- repeat Hgb to ensure stability    CAD   SBPs 130s-150s  -- will resume ASA 81 mg daily once completes DVT ppx with 325 mg    Hypothyroidisim  TSH 0.62 in December  -- continues on levothyroxine 75 mcg daily    Gout  By history. No signs of flare.   -- continues on allopurinol 300 mg daily    GERD  -- continues on ranitidine 150 mg qhs    Drug Induced Constipation  -- continues on Miralax 17g daily PRN and Senna-S 2 tabs BID  -- adjust bowel regimen as needed      Electronically signed by:  Bella Pappas MD

## 2018-02-16 ENCOUNTER — HOSPITAL LABORATORY (OUTPATIENT)
Facility: OTHER | Age: 83
End: 2018-02-16

## 2018-02-16 LAB
ANION GAP SERPL CALCULATED.3IONS-SCNC: 9 MMOL/L (ref 3–14)
BUN SERPL-MCNC: 10 MG/DL (ref 7–30)
CALCIUM SERPL-MCNC: 8.3 MG/DL (ref 8.5–10.1)
CHLORIDE SERPL-SCNC: 97 MMOL/L (ref 94–109)
CO2 SERPL-SCNC: 26 MMOL/L (ref 20–32)
CREAT SERPL-MCNC: 1.12 MG/DL (ref 0.52–1.04)
GFR SERPL CREATININE-BSD FRML MDRD: 47 ML/MIN/1.7M2
GLUCOSE SERPL-MCNC: 96 MG/DL (ref 70–99)
POTASSIUM SERPL-SCNC: 4.4 MMOL/L (ref 3.4–5.3)
SODIUM SERPL-SCNC: 132 MMOL/L (ref 133–144)

## 2018-02-19 ENCOUNTER — DISCHARGE SUMMARY NURSING HOME (OUTPATIENT)
Dept: GERIATRICS | Facility: CLINIC | Age: 83
End: 2018-02-19
Payer: COMMERCIAL

## 2018-02-19 VITALS
RESPIRATION RATE: 20 BRPM | HEIGHT: 63 IN | WEIGHT: 238 LBS | OXYGEN SATURATION: 96 % | TEMPERATURE: 98.6 F | BODY MASS INDEX: 42.17 KG/M2 | SYSTOLIC BLOOD PRESSURE: 89 MMHG | HEART RATE: 71 BPM | DIASTOLIC BLOOD PRESSURE: 47 MMHG

## 2018-02-19 DIAGNOSIS — D62 ACUTE BLOOD LOSS ANEMIA: ICD-10-CM

## 2018-02-19 DIAGNOSIS — Z47.1 AFTERCARE FOLLOWING RIGHT HIP JOINT REPLACEMENT SURGERY: Primary | ICD-10-CM

## 2018-02-19 DIAGNOSIS — R35.0 URINARY FREQUENCY: ICD-10-CM

## 2018-02-19 DIAGNOSIS — Z96.641 STATUS POST TOTAL REPLACEMENT OF RIGHT HIP: ICD-10-CM

## 2018-02-19 DIAGNOSIS — E03.9 HYPOTHYROIDISM, UNSPECIFIED TYPE: ICD-10-CM

## 2018-02-19 DIAGNOSIS — N18.30 CKD (CHRONIC KIDNEY DISEASE) STAGE 3, GFR 30-59 ML/MIN (H): ICD-10-CM

## 2018-02-19 DIAGNOSIS — M10.9 GOUT, UNSPECIFIED CAUSE, UNSPECIFIED CHRONICITY, UNSPECIFIED SITE: ICD-10-CM

## 2018-02-19 DIAGNOSIS — Z96.641 AFTERCARE FOLLOWING RIGHT HIP JOINT REPLACEMENT SURGERY: Primary | ICD-10-CM

## 2018-02-19 DIAGNOSIS — E87.1 HYPONATREMIA: ICD-10-CM

## 2018-02-19 DIAGNOSIS — I25.10 CORONARY ARTERY DISEASE INVOLVING NATIVE CORONARY ARTERY OF NATIVE HEART WITHOUT ANGINA PECTORIS: ICD-10-CM

## 2018-02-19 DIAGNOSIS — K59.03 DRUG INDUCED CONSTIPATION: ICD-10-CM

## 2018-02-19 DIAGNOSIS — R53.81 PHYSICAL DECONDITIONING: ICD-10-CM

## 2018-02-19 DIAGNOSIS — K21.9 GASTROESOPHAGEAL REFLUX DISEASE WITHOUT ESOPHAGITIS: ICD-10-CM

## 2018-02-19 PROCEDURE — 99316 NF DSCHRG MGMT 30 MIN+: CPT | Performed by: NURSE PRACTITIONER

## 2018-02-19 NOTE — PROGRESS NOTES
[place] [facility] DISCHARGE SUMMARY    PATIENT'S NAME: Elly Choe  YOB: 1935  MEDICAL RECORD NUMBER:  [id number]    PRIMARY CARE PROVIDER AND CLINIC RESPONSIBLE AFTER TRANSFER: Joy Kam [address] [place] [address]     CODE STATUS/ADVANCE DIRECTIVES DISCUSSION:   DNR / DNI       Allergies   Allergen Reactions     Sulfa Drugs Other (See Comments)     Causes burning when urinates. Causes yeast infections.       TRANSFERRING PROVIDERS: ROSA Perera CNP, Bella Chisholm MD  DATE OF SNF ADMISSION:  February / 03 / 2018  DATE OF SNF (anticipated) DISCHARGE: February / 21 / 2018  DISCHARGE DISPOSITION: FMG Provider   Nursing Facility: [facility] stay 1/31/2018 to 2/3/2018.     Condition on Discharge:  Improving.  Function:  Ind in room 10-25' as needed with use of 4ww. SBA of 1 in halls and on unit. CGA of 1 with bed/chair transfers, using 4ww. SBA of 1 with sit/stand transfers, using 4ww. Ind. with toilet transfers. Moderate assist of 1 with bed   Cognitive Scores: SLUMS 25/30, CPT 5.2/5.6 and Safety questions 17.5/22    Equipment: walker    DISCHARGE DIAGNOSIS:   1. Aftercare following right hip joint replacement surgery    2. Status post total replacement of right hip    3. Physical deconditioning    4. Acute blood loss anemia    5. Coronary artery disease involving native coronary artery of native heart without angina pectoris    6. Hypothyroidism, unspecified type    7. Gout, unspecified cause, unspecified chronicity, unspecified site    8. Drug induced constipation    9. Gastroesophageal reflux disease without esophagitis    10. Urinary frequency    11. Hyponatremia    12. CKD (chronic kidney disease) stage 3, GFR 30-59 ml/min        HPI Nursing Facility Course:  HPI information obtained from: facility chart records, facility staff, patient report and [facility] chart review.    Elly  Daija is an 82 year old female with PMH of CAD, CKD, gout, hypothyroidism and bilat knee and shoulder replacements who underwent elective right total hip replacement on 1/31/2018. Post-operative course was uncomplicated. Pain was controlled on norco. She was discharged to TCU on POD # 3 or rehab and medical management.    Did have some difficulty with pain control and willingness to work with therapy while in TCU. This improved and she had better participation with therapy. She is planning to discharge home with PT and OT.    Aftercare following right hip joint replacement surgery  Status post total replacement of right hip  Physical deconditioning  Was on scheduled and PRN norco as well as PRN tylenol in TCU. By discharge had been weaned off scheduled norco and primarily using tylenol with adequate pain control. She is on She is on ASA 325mg x 42 days. Has been utilizing ice. Did have some difficulty following hip precautions initially but this has now improved. Had ortho f/u on 2/14 with no concerns.     Acute blood loss anemia  Hgb 14.4 - >10.7. Hgb stable in TCU. No concerns of bleeding.     Coronary artery disease involving native coronary artery of native heart without angina pectoris  History of coronary angiogram. Currently on ASA 325g x 42 days then will resume baby ASA daily.    Hypothyroidism, unspecified type  TSH 0.62, currently on levothyroxine 75mcg daily, recommend f/u with PCP    Gout, unspecified cause, unspecified chronicity, unspecified site  On PTA allopurinol, no concerns during TCU stay.     Drug induced constipation  Has been variable in TCU - some loose and some hard stools, has been having a BM q 1-2 days. Currently on senna-s 2 tab BID and miralax daily PRN    Gastroesophageal reflux disease without esophagitis  Was started on Zantac for c/o nausea, indigestion, and excessive belching. She was started on zantac 150mg PO qhs with improvement.     Urinary frequency  Had c/o of urinary  frequency and dysuria, UA was sent and was negative, symptoms resolved prior to discharge.     Hyponatremia  Had NA as low as 129 in TCU, suspect some component of SIADH. Improved to 132 prior to discharge.     CKD (chronic kidney disease) stage 3, GFR 30-59 ml/min  Baseline appears to be ~1.1-1.2. Stable.      PAST MEDICAL HISTORY:  has a past medical history of ACS (acute coronary syndrome) (H) (4/14/2014); Acute dermatitis due to solar radiation (9/9/2010); Anemia (12/6/2012); Diverticulitis of colon (5/17/2005); HX OF COMPOUND HEMANGIOMA NOS [228.00]; HX OF SUPERFICIAL PHLEBITIS; Phlebitis and thrombophlebitis of superficial vessels of lower extremities (10/2/2012); S/P knee replacement left (1/2/2013); S/P PTCA (percutaneous transluminal coronary angioplasty) (6/18/2014); Status post shoulder joint replacement (10/5/2012); and Status post total shoulder arthroplasty, left (5/4/2016). She also has no past medical history of Diabetes (H) or PONV (postoperative nausea and vomiting).    DISCHARGE MEDICATIONS:  Current Outpatient Prescriptions   Medication Sig Dispense Refill     aspirin 81 MG tablet Take 81 mg by mouth daily       HYDROcodone-acetaminophen (NORCO) 5-325 MG per tablet Give 1 tablet by mouth four times a day for Pain may have 1 tab of prn Norco with scheduled, not to exceed 2 tabs in 4hrs       bisacodyl (DULCOLAX) 10 MG Suppository Place 10 mg rectally daily Also daily as needed       senna-docusate (SENOKOT-S;PERICOLACE) 8.6-50 MG per tablet Take 2 tablets by mouth 2 times daily       RaNITidine HCl (ZANTAC PO) Take 150 mg by mouth At Bedtime       calcium carbonate (TUMS) 500 MG chewable tablet Take 2 chew tab by mouth 3 times daily as needed for heartburn       Ondansetron HCl (ZOFRAN PO) Take 4 mg by mouth every 8 hours as needed for nausea or vomiting       Menthol, Topical Analgesic, 16 % GEL Externally apply topically 3 times daily       polyethylene glycol (MIRALAX/GLYCOLAX) powder Take 17 g  "by mouth daily as needed for constipation       aspirin 325 MG tablet Take 1 tablet (325 mg) by mouth daily 40 tablet 0     HYDROcodone-acetaminophen (NORCO) 5-325 MG per tablet Take 1-2 tablets by mouth every 4 hours as needed for moderate to severe pain 60 tablet 0     allopurinol (ZYLOPRIM) 100 MG tablet TAKE ONE TABLET BY MOUTH EVERY DAY 90 tablet 3     Acetaminophen (TYLENOL PO) Take 325 mg by mouth every 4 hours as needed for mild pain or fever       levothyroxine (SYNTHROID/LEVOTHROID) 75 MCG tablet Take 1 tablet (75 mcg) by mouth daily 90 tablet 3     multivitamin, therapeutic with minerals (MULTI-VITAMIN) TABS Take 1 tablet by mouth daily 100 tablet 3       MEDICATION CHANGES/RATIONALE:     Scheduled Norco discontinued  DC'd zofran, bisacodyl, Tums, at discharge  Controlled medications sent with patient:   Medication: Norco , 10 tabs given to patient at the time of discharge to take home     ROS:    10 point ROS of systems including Constitutional, Eyes, Respiratory, Cardiovascular, Gastroenterology, Genitourinary, Integumentary, Muscularskeletal, Psychiatric were all negative except for pertinent positives noted in my HPI.    Physical Exam:   Vitals: BP (!) 89/47  Pulse 71  Temp 98.6  F (37  C)  Resp 20  Ht 5' 3\" (1.6 m)  Wt 238 lb (108 kg)  LMP 01/14/2013  SpO2 96%  BMI 42.16 kg/m2  BMI= Body mass index is 42.16 kg/(m^2).    GENERAL APPEARANCE:  Alert, in no distress, oriented, cooperative  RESP:  respiratory effort and palpation of chest normal, lungs clear to auscultation , no respiratory distress  CV:  Palpation and auscultation of heart done , regular rate and rhythm, no murmur, rub, or gallop, +2 pedal pulses, peripheral edema 1+ in right hip/knee  ABDOMEN:  normal bowel sounds, soft, nontender, no hepatosplenomegaly or other masses, obese, round, no guarding or rebound  M/S:   Gait and station abnormal decreased ROM to right hip, mild tenderness, no other joint tenderness noted  SKIN:  " Inspection of skin and subcutaneous tissue baseline, Palpation of skin and subcutaneous tissue baseline, wound healing well, no signs of infection right hip C/D/I, open to area,   NEURO:   Cranial nerves 2-12 are normal tested and grossly at patient's baseline, Examination of sensation by touch normal  PSYCH:  oriented X 3, normal insight, judgement and memory, affect and mood normal, anxious at times    DISCHARGE PLAN:  Occupational Therapy, Physical Therapy and From:  [facility]  Patient instructed to follow-up with:  PCP in 7 days      Current [place] scheduled appointments:  No future appointments.    MTM referral needed and placed by this provider: No    Pending labs: None  SNF labs   CBC RESULTS:   Recent Labs   Lab Test  02/12/18   0750  02/09/18   0746   WBC  5.8  5.7   RBC  3.07*  3.22*   HGB  10.0*  10.5*   HCT  29.7*  31.1*   MCV  97  97   MCH  32.6  32.6   MCHC  33.7  33.8   RDW  13.6  13.7   PLT  224  260       Last Basic Metabolic Panel:  Recent Labs   Lab Test  02/16/18   0915  02/12/18   0750   NA  132*  129*   POTASSIUM  4.4  4.3   CHLORIDE  97  94   JANET  8.3*  8.5   CO2  26  26   BUN  10  10   CR  1.12*  1.03   GLC  96  79       Liver Function Studies -   Recent Labs   Lab Test  12/19/17   1213  10/07/16   1501   PROTTOTAL  6.9  7.2   ALBUMIN  3.8  4.0   BILITOTAL  0.6  0.4   ALKPHOS  84  104   AST  20  22   ALT  20  23       TSH   Date Value Ref Range Status   12/19/2017 0.62 0.40 - 4.00 mU/L Final   06/01/2017 0.67 0.40 - 4.00 mU/L Final     Lab Results   Component Value Date    A1C 5.6 02/11/2013    A1C 5.7 01/08/2007     Discharge Treatments:  F/u with PCP in 7-10 days  F/u with ortho as scheduled  Medications as above  Home PT and OT  Hip precautions    TOTAL DISCHARGE TIME:   Greater than 30 minutes  Electronically signed by:  ROSA Perera CNP

## 2018-02-22 ENCOUNTER — TELEPHONE (OUTPATIENT)
Dept: FAMILY MEDICINE | Facility: CLINIC | Age: 83
End: 2018-02-22

## 2018-02-22 ENCOUNTER — CARE COORDINATION (OUTPATIENT)
Dept: CARE COORDINATION | Facility: CLINIC | Age: 83
End: 2018-02-22

## 2018-02-22 NOTE — PROGRESS NOTES
Clinic Care Coordination Contact  [facility]/Voicemail    Referral Source: IP/TCU Report    Clinical Data: Care Coordinator Outreach, d/c [facility] 2/21/18 to home with [facility]. Per Jennifer intake, PT saw patient today recommending SN visit. Patient lives alone, decreased cognitive scores per d/c summary of TCU, only has neighbor to depend on, family out of states.    Outreach attempted x 1.  No answer, voicemail box says its too full to leave a message.     Plan: Care Coordinator will mail out care coordination introduction letter with care coordinator contact information and explanation of care coordination services. Care Coordinator will try to reach patient again in 1-2 business days.    Ambreen Bah RN, [facility]  RN Care Coordinator  [facility]  Phone # [phone]  2/22/2018 3:16 PM

## 2018-02-22 NOTE — TELEPHONE ENCOUNTER
See Care Coordination encounter 2/22/2018        Ambreen Bah RN, [facility]  RN Care Coordinator  [facility]  Phone # [phone]  2/22/2018 10:02 AM

## 2018-02-22 NOTE — LETTER
Health Care Home - Access Care Plan    About Me  Patient Name:  Elly Choe    YOB: 1935  Age:                             82 year old   Geovanni MRN:            [id number] Telephone Information:     Home Phone [phone]   Mobile [phone]       Address:    [address]   [place] [address] Email address:  No e-mail address on record      Emergency Contact(s)  Name Relationship Lgl Grd Work Phone Home Phone Mobile Phone   REBEKAH BARROW Friend   [phone]              Health Maintenance: Routine Health maintenance Reviewed: Due/Overdue   Health Maintenance Due   Topic Date Due     COPD ACTION PLAN Q1 YR  01/18/1936     ADVANCE DIRECTIVE PLANNING Q5 YRS  04/12/2017     INFLUENZA VACCINE (SYSTEM ASSIGNED)  09/01/2017         My Access Plan  Medical Emergency 911   Questions or concerns during clinic hours Primary Clinic Line, I will call the clinic directly: Primary Clinic: [facility]- [phone]   24 Hour Appointment Line [phone] or  [phone]) (toll free)   24 Hour Nurse Line [phone] (toll free)   Questions or concerns outside clinic hours 24 Hour Appointment Line, I will call the after-hours on-call line:   [facility] [phone] or [phone]) (toll-free)   Preferred Urgent Care Preferred Urgent Care: [facility], [phone]   Preferred Hospital Preferred Hospital: [place], [place]  [phone]   Preferred Pharmacy [facility] [address] Behavioral Health Crisis Line The National Suicide Prevention Lifeline at 1-489.174.9483 or 914     My Care Team Members  Patient Care Team       Relationship Specialty Notifications Start End    Joy Kam APRN CNP PCP - General Nurse Practitioner - Family  1/22/18     Phone: [phone] Fax: [phone] [address] [address]     Ambreen Bah, RN Clinic Care Coordinator  Admissions 2/2/18     Phone: [phone] Fax: [phone]        [facility] ([facility] ([place])  2/22/18     Phone: [phone] Fax: [phone] [address] [facility] [address]    Sen Parry MD MD Orthopedics Admissions 2/22/18     Phone: [phone] Fax: [phone]         [facility] ORTHOP[facility] [address] [facility] [address]        My Medical and Care Information  Problem List   Patient Active Problem List   Diagnosis     Gastroesophageal reflux disease without esophagitis     Hemangioma     Benign essential hypertension     surgical menopause (SARAH BSO) for non-cancerous reasons     Morbid obesity due to excess calories (H)     Idiopathic chronic gout of multiple sites without tophus     Chronic airway obstruction (H)     Hyperlipidemia LDL goal <100     CKD (chronic kidney disease) stage 3, GFR 30-59 ml/min     Advanced directives, counseling/discussion     Acquired hypothyroidism     DJD of shoulder     Primary localized osteoarthrosis, lower leg     Generalized anxiety disorder     Health Care Home     Coronary artery disease involving native coronary artery of native heart without angina pectoris     FH: rheumatoid arthritis     Osteopenia     Status post total replacement of right hip     Degenerative joint disease (DJD) of hip     Postoperative anemia      Current Medications and Allergies:  See printed Medication Report

## 2018-02-22 NOTE — LETTER
[place] CARE COORDINATION  [facility]  [address] [place] [address]  [phone]    February 22, 2018    Elly Choe  [address]   [place] [address]      Dear Elly,    I am a clinic care coordinator who works with ROSA Patel CNP at [facility]. I have been trying to reach you recently to introduce Clinic Care Coordination and to see if there was anything I could assist you with.  I wanted to introduce myself and provide you with my contact information so that you can call me with questions or concerns about your health care. Below is a description of clinic care coordination and how I can further assist you.     The clinic care coordinator is a registered nurse and/or  who understand the health care system. The goal of clinic care coordination is to help you manage your health and improve access to the [facility] system in the most efficient manner. The registered nurse can assist you in meeting your health care goals by providing education, coordinating services, and strengthening the communication among your providers. The  can assist you with financial, behavioral, psychosocial, chemical dependency, counseling, and/or psychiatric resources.    Please feel free to contact me at [phone], with any questions or concerns. We at [facility] are focused on providing you with the highest-quality healthcare experience possible and that all starts with you.     Sincerely,     Ambreen Bah          Enclosed: I have enclosed a copy of a 24 Hour Access Plan. This has helpful phone numbers for you to call when needed. Please keep this in an easy to access place to use as needed.  I have enclosed an Authorization to Discuss Protected Health Information form. Please review, fill out, sign and send back to me so I can make sure we have this on file to be able to talk to family/friends if you would like us  to be able to.

## 2018-02-23 ENCOUNTER — DOCUMENTATION ONLY (OUTPATIENT)
Dept: CARE COORDINATION | Facility: CLINIC | Age: 83
End: 2018-02-23

## 2018-02-23 ENCOUNTER — TELEPHONE (OUTPATIENT)
Dept: FAMILY MEDICINE | Facility: CLINIC | Age: 83
End: 2018-02-23

## 2018-02-23 NOTE — PROGRESS NOTES
Clinic Care Coordination Contact  OUTREACH    Referral Information:  Referral Source: IP/TCU Report  Reason for Contact: [facility] after having her right hip replacement.  Care Conference: No     Universal Utilization:   ED Visits in last year: 3  Hospital visits in last year: 1  Last PCP appointment: 01/26/18        Multiple Providers or Specialists: Orthopedic    Clinical Concerns:  Current Medical Concerns: RN CC spoke with patient and home care nurse doing assessment, patient is doing well. She denies having any questions or concerns regarding her discharge instructions. RN CC assisted her in making a f/u appointment with PCP for Monday 2/26/18 @ 2:20pm. The only request that needs to get addressed per the home care RN, was that patient has it stated in the chart that she is DNR/DNI but there is no document in her chart regarding this plus no POLST, also patient made a comment to the nurse that she might want CPR. RN CC agreed to send message to PCP about this to discuss on Monday at f/u.  Patient is not really wanting to use the Narco due to having head fogginess, plan is to try to just stick with Tylenol and only take the Narco if pain is getting out of control or at bedtime. Patient is also reluctant to take any stool softners due to history of loose stools.    Current Behavioral Concerns: No concerns at this time.      Education Provided to patient: RN CC educated about Care Coordination Services, discharge instructions, and follow up.      Clinical Pathway Name: None  Clinical Pathway: None    Medication Management:  Patient is independent in medication management. Pt. verbalized adherence and understanding of medication regimen, side effects, purposes. Home care nurse reports Patient is setting them up in medication boxes weekly and states this routine is working well and correctly done.        Functional Status:  Mobility Status: Independent  w/Device  Equipment Currently Used at Home: grab bar, shower chair, walker, rolling  Transportation: Public transportation  Transportation Agency: [employer]  Transportation Contact Info: [employer] [phone]     Psychosocial:  Current living arrangement:: I live alone, family out of state. Has a neighbor that is her support. SLUMS 25/30 at TCU.  Financial/Insurance: No concerns at this time.       Resources and Interventions:  Current Resources: Home Care; Transportation Services, Home Care RN Bridgettgeoffreymark was there now doing her assessment and has no major concerns.    RN did tell Manuel to tell patient that she mailed out a letter to pt's home with RN CC contact information, explanation of CC services and patient care plan.           Advanced Care Plans/Directives on file:: No     Patient/Caregiver understanding: patient verbalized understanding of her discharge instructions.  Frequency of Care Coordination: in 1 week  Upcoming appointment: 02/26/18 (TCU f/u with PCP)     Plan:   Patient will continue to follow treatment plan as directed and follow up with PCP with concerns ongoing.   Patient to attend her TCU f/u with PCP on 2/26/18.  RN CC to f/u in 1 week to make sure things going ok.    Ambreen Bah RN, [facility]  RN Care Coordinator  [facility]  Phone # [phone]  2/23/2018 9:20 AM

## 2018-02-23 NOTE — PROGRESS NOTES
[facility] now requests orders and shares plan of care/discharge summaries for some patients through [employer]  Please REPLY TO THIS MESSAGE in order to give authorization for orders when needed.  This is considered a verbal order, you will still receive a faxed copy of orders for signature.  Thank you for your assistance in improving collaboration for our patients.    ORDER  SN  1 Week 2  1 As Needed     1st schduled visits week of 2/26/18  SN to perform assessment with focus on medication management and reconciliation, pain management, mental health status and need for referral or change in treatment plan, skin integrity, falls risk, and to notify physician for any change in condition, increase confusion, change in meds. Instruct on disease process, signs/symptoms of complications to report to agency/physician/911, emergency/safety and falls prevention plan, medication effects/SE, new/high risk/changed medications, diet, and activity. Implement interventions to monitor and mitigate pain, prevent pressure ulcers and confirm proper foot care.   1 prn visits for change in condtion, falls, medication changes and medical symptoms that would necessitate an unplanned visit, supervisory visits per agency protocol, recertification assessments.  Visits may be in person or via video conference based upon patient needs.      Thank you  Breonna Becker RN  [phone]

## 2018-02-23 NOTE — TELEPHONE ENCOUNTER
[employer] [facility] [employer]- [employer] and [employer] to Kailee Kam for signature.

## 2018-02-26 ENCOUNTER — OFFICE VISIT (OUTPATIENT)
Dept: FAMILY MEDICINE | Facility: CLINIC | Age: 83
End: 2018-02-26
Payer: COMMERCIAL

## 2018-02-26 VITALS
HEIGHT: 63 IN | SYSTOLIC BLOOD PRESSURE: 112 MMHG | WEIGHT: 227 LBS | HEART RATE: 72 BPM | BODY MASS INDEX: 40.22 KG/M2 | DIASTOLIC BLOOD PRESSURE: 72 MMHG | TEMPERATURE: 98.5 F

## 2018-02-26 DIAGNOSIS — Z09 HOSPITAL DISCHARGE FOLLOW-UP: Primary | ICD-10-CM

## 2018-02-26 DIAGNOSIS — Z96.641 STATUS POST TOTAL REPLACEMENT OF RIGHT HIP: ICD-10-CM

## 2018-02-26 PROCEDURE — 99213 OFFICE O/P EST LOW 20 MIN: CPT | Performed by: NURSE PRACTITIONER

## 2018-02-26 NOTE — MR AVS SNAPSHOT
"              After Visit Summary   2018    Elly Choe    MRN: [id number]           Patient Information     Date Of Birth          1935        Visit Information        Provider Department      2018 2:20 PM Joy Kam APRN CNP [facility]        Today's [facility]     Hospital discharge follow-up    -  1    Status post total replacement of right hip           Follow-ups after your visit        Who to contact     If you have questions or need follow up information about today's clinic visit or your schedule please contact [facility] directly at [phone].  Normal or non-critical lab and imaging results will be communicated to you by [employer], letter or phone within 4 business days after the clinic has received the results. If you do not hear from us within 7 days, please contact the clinic through [employer] or phone. If you have a critical or abnormal lab result, we will notify you by phone as soon as possible.  Submit refill requests through [employer] or call your pharmacy and they will forward the refill request to us. Please allow 3 business days for your refill to be completed.          Additional Information About Your Visit        MyChart Information     [employer] lets you send messages to your doctor, view your test results, renew your prescriptions, schedule appointments and more. To sign up, go to [email or web address] . Click on \"Log in\" on the left side of the screen, which will take you to the Welcome page. Then click on \"Sign up Now\" on the right side of the page.     You will be asked to enter the access code listed below, as well as some personal information. Please follow the directions to create your username and password.     Your access code is: [email or web address]  Expires: 2018 11:23 AM     Your access code will  in 90 days. If you need help or a new code, please call your [facility] or [phone].        Care " "EveryWhere ID     This is your Care EveryWhere ID. This could be used by other organizations to access your [facility] medical records  [id number]        Your Vitals Were     Pulse Temperature Height Last Period BMI (Body Mass Index)       72 98.5  F (36.9  C) (Tympanic) 5' 3\" (1.6 m) 01/14/2013 40.21 kg/m2        Blood Pressure from Last 3 Encounters:   02/27/18 (!) 155/112   02/26/18 112/72   02/19/18 (!) 89/47    Weight from Last 3 Encounters:   02/26/18 227 lb (103 kg)   02/19/18 238 lb (108 kg)   02/15/18 238 lb (108 kg)              We Performed the Following     DNR/DNI          Today's Medication Changes          These changes are accurate as of 2/26/18 11:59 PM.  If you have any questions, ask your nurse or doctor.               These medicines have changed or have updated prescriptions.        Dose/Directions    aspirin 325 MG tablet   This may have changed:  Another medication with the same name was removed. Continue taking this medication, and follow the directions you see here.   Used for:  Primary localized osteoarthrosis of right lower leg   Changed by:  Joy Kam APRN CNP        Dose:  325 mg   Take 1 tablet (325 mg) by mouth daily   Quantity:  40 tablet   Refills:  0         Stop taking these medicines if you haven't already. Please contact your care team if you have questions.     HYDROcodone-acetaminophen 5-325 MG per tablet   Commonly known as:  NORCO   Stopped by:  Joy Kam APRN CNP           Menthol (Topical Analgesic) 16 % Gel   Stopped by:  Joy Kam APRN CNP           polyethylene glycol powder   Commonly known as:  MIRALAX/GLYCOLAX   Stopped by:  Joy Kam APRN CNP           senna-docusate 8.6-50 MG per tablet   Commonly known as:  SENOKOT-S;PERICOLACE   Stopped by:  Joy Kam APRN CNP           ZANTAC PO   Stopped by:  Joy Kam APRN CNP                    Primary Care Provider Office Phone # Fax #    Joy " Coral Kam, APRN [phone]       [address] [facility] [address]        Equal Access to Services     MASON VICENTE : Hadii aad ku hadstaceyang Harrington, selamkurt robertsnancyha, evan domojessika ramos, isidro kathyin hayaaannette gibsonmirta mack aakash fritz. So [facility] [phone].    ATENCIÓN: Si habla español, tiene a willis disposición servicios gratuitos de asistencia lingüística. Llame al [phone].    We comply with applicable federal civil rights laws and [place] laws. We do not discriminate on the basis of race, color, national origin, age, disability, sex, sexual orientation, or gender identity.            Thank you!     Thank you for choosing [facility]  for your care. Our goal is always to provide you with excellent care. Hearing back from our patients is one way we can continue to improve our services. Please take a few minutes to complete the written survey that you may receive in the mail after your visit with us. Thank you!             Your Updated Medication List - Protect others around you: Learn how to safely use, store and throw away your medicines at www.disposemymeds.org.          This list is accurate as of 2/26/18 11:59 PM.  Always use your most recent med list.                   Brand Name Dispense Instructions for use Diagnosis    allopurinol 100 MG tablet    ZYLOPRIM    90 tablet    TAKE ONE TABLET BY MOUTH EVERY DAY    Gouty arthropathy       aspirin 325 MG tablet     40 tablet    Take 1 tablet (325 mg) by mouth daily    Primary localized osteoarthrosis of right lower leg       levothyroxine 75 MCG tablet    SYNTHROID/LEVOTHROID    90 tablet    Take 1 tablet (75 mcg) by mouth daily    Acquired hypothyroidism       multivitamin, therapeutic with minerals Tabs tablet     100 tablet    Take 1 tablet by mouth daily    Anemia, unspecified anemia type       TYLENOL PO      Take 325-650 mg by mouth every 4 hours as needed for mild pain or fever

## 2018-02-27 ENCOUNTER — HOSPITAL ENCOUNTER (EMERGENCY)
Facility: CLINIC | Age: 83
Discharge: HOME | End: 2018-02-27
Attending: FAMILY MEDICINE | Admitting: FAMILY MEDICINE
Payer: COMMERCIAL

## 2018-02-27 ENCOUNTER — APPOINTMENT (OUTPATIENT)
Dept: CT IMAGING | Facility: CLINIC | Age: 83
End: 2018-02-27
Attending: FAMILY MEDICINE
Payer: COMMERCIAL

## 2018-02-27 VITALS
DIASTOLIC BLOOD PRESSURE: 112 MMHG | OXYGEN SATURATION: 98 % | RESPIRATION RATE: 16 BRPM | TEMPERATURE: 98.9 F | SYSTOLIC BLOOD PRESSURE: 155 MMHG

## 2018-02-27 DIAGNOSIS — M62.81 GENERALIZED MUSCLE WEAKNESS: ICD-10-CM

## 2018-02-27 DIAGNOSIS — R53.81 MALAISE: ICD-10-CM

## 2018-02-27 LAB
ALBUMIN SERPL-MCNC: 3.5 G/DL (ref 3.4–5)
ALBUMIN UR-MCNC: NEGATIVE MG/DL
ALP SERPL-CCNC: 91 U/L (ref 40–150)
ALT SERPL W P-5'-P-CCNC: 16 U/L (ref 0–50)
ANION GAP SERPL CALCULATED.3IONS-SCNC: 9 MMOL/L (ref 3–14)
APPEARANCE UR: CLEAR
APTT PPP: 38 SEC (ref 22–37)
AST SERPL W P-5'-P-CCNC: 19 U/L (ref 0–45)
BASOPHILS # BLD AUTO: 0 10E9/L (ref 0–0.2)
BASOPHILS NFR BLD AUTO: 0.4 %
BILIRUB SERPL-MCNC: 1.1 MG/DL (ref 0.2–1.3)
BILIRUB UR QL STRIP: NEGATIVE
BUN SERPL-MCNC: 11 MG/DL (ref 7–30)
CALCIUM SERPL-MCNC: 8.9 MG/DL (ref 8.5–10.1)
CHLORIDE SERPL-SCNC: 92 MMOL/L (ref 94–109)
CO2 SERPL-SCNC: 26 MMOL/L (ref 20–32)
COLOR UR AUTO: ABNORMAL
CREAT SERPL-MCNC: 1.12 MG/DL (ref 0.52–1.04)
DIFFERENTIAL METHOD BLD: ABNORMAL
EOSINOPHIL # BLD AUTO: 0.1 10E9/L (ref 0–0.7)
EOSINOPHIL NFR BLD AUTO: 1.9 %
ERYTHROCYTE [DISTWIDTH] IN BLOOD BY AUTOMATED COUNT: 13.1 % (ref 10–15)
GFR SERPL CREATININE-BSD FRML MDRD: 47 ML/MIN/1.7M2
GLUCOSE SERPL-MCNC: 108 MG/DL (ref 70–99)
GLUCOSE UR STRIP-MCNC: NEGATIVE MG/DL
HCT VFR BLD AUTO: 35.2 % (ref 35–47)
HGB BLD-MCNC: 12.1 G/DL (ref 11.7–15.7)
HGB UR QL STRIP: ABNORMAL
IMM GRANULOCYTES # BLD: 0 10E9/L (ref 0–0.4)
IMM GRANULOCYTES NFR BLD: 0.1 %
INR PPP: 1.16 (ref 0.86–1.14)
KETONES UR STRIP-MCNC: NEGATIVE MG/DL
LEUKOCYTE ESTERASE UR QL STRIP: ABNORMAL
LIPASE SERPL-CCNC: 146 U/L (ref 73–393)
LYMPHOCYTES # BLD AUTO: 1 10E9/L (ref 0.8–5.3)
LYMPHOCYTES NFR BLD AUTO: 13.4 %
MCH RBC QN AUTO: 32.2 PG (ref 26.5–33)
MCHC RBC AUTO-ENTMCNC: 34.4 G/DL (ref 31.5–36.5)
MCV RBC AUTO: 94 FL (ref 78–100)
MONOCYTES # BLD AUTO: 0.8 10E9/L (ref 0–1.3)
MONOCYTES NFR BLD AUTO: 10.4 %
NEUTROPHILS # BLD AUTO: 5.4 10E9/L (ref 1.6–8.3)
NEUTROPHILS NFR BLD AUTO: 73.8 %
NITRATE UR QL: NEGATIVE
PH UR STRIP: 9 PH (ref 5–7)
PLATELET # BLD AUTO: 210 10E9/L (ref 150–450)
POTASSIUM SERPL-SCNC: 4.2 MMOL/L (ref 3.4–5.3)
PROT SERPL-MCNC: 6.6 G/DL (ref 6.8–8.8)
RBC # BLD AUTO: 3.76 10E12/L (ref 3.8–5.2)
RBC #/AREA URNS AUTO: 2 /HPF (ref 0–2)
SODIUM SERPL-SCNC: 127 MMOL/L (ref 133–144)
SOURCE: ABNORMAL
SP GR UR STRIP: 1.02 (ref 1–1.03)
SQUAMOUS #/AREA URNS AUTO: 3 /HPF (ref 0–1)
TROPONIN I SERPL-MCNC: <0.015 UG/L (ref 0–0.04)
UROBILINOGEN UR STRIP-MCNC: 0 MG/DL (ref 0–2)
WBC # BLD AUTO: 7.3 10E9/L (ref 4–11)
WBC #/AREA URNS AUTO: 4 /HPF (ref 0–2)

## 2018-02-27 PROCEDURE — 80053 COMPREHEN METABOLIC PANEL: CPT | Performed by: FAMILY MEDICINE

## 2018-02-27 PROCEDURE — 96360 HYDRATION IV INFUSION INIT: CPT | Performed by: FAMILY MEDICINE

## 2018-02-27 PROCEDURE — 85730 THROMBOPLASTIN TIME PARTIAL: CPT | Performed by: FAMILY MEDICINE

## 2018-02-27 PROCEDURE — 25000128 H RX IP 250 OP 636: Performed by: FAMILY MEDICINE

## 2018-02-27 PROCEDURE — 25000128 H RX IP 250 OP 636: Performed by: RADIOLOGY

## 2018-02-27 PROCEDURE — 93010 ELECTROCARDIOGRAM REPORT: CPT | Mod: Z6 | Performed by: FAMILY MEDICINE

## 2018-02-27 PROCEDURE — 93005 ELECTROCARDIOGRAM TRACING: CPT | Performed by: FAMILY MEDICINE

## 2018-02-27 PROCEDURE — 71260 CT THORAX DX C+: CPT

## 2018-02-27 PROCEDURE — 83690 ASSAY OF LIPASE: CPT | Performed by: FAMILY MEDICINE

## 2018-02-27 PROCEDURE — 85025 COMPLETE CBC W/AUTO DIFF WBC: CPT | Performed by: FAMILY MEDICINE

## 2018-02-27 PROCEDURE — 99285 EMERGENCY DEPT VISIT HI MDM: CPT | Mod: 25 | Performed by: FAMILY MEDICINE

## 2018-02-27 PROCEDURE — 25000125 ZZHC RX 250: Performed by: RADIOLOGY

## 2018-02-27 PROCEDURE — 85610 PROTHROMBIN TIME: CPT | Performed by: FAMILY MEDICINE

## 2018-02-27 PROCEDURE — 84484 ASSAY OF TROPONIN QUANT: CPT | Performed by: FAMILY MEDICINE

## 2018-02-27 PROCEDURE — 81001 URINALYSIS AUTO W/SCOPE: CPT | Performed by: FAMILY MEDICINE

## 2018-02-27 PROCEDURE — 96361 HYDRATE IV INFUSION ADD-ON: CPT | Performed by: FAMILY MEDICINE

## 2018-02-27 RX ORDER — SODIUM CHLORIDE 9 MG/ML
1000 INJECTION, SOLUTION INTRAVENOUS CONTINUOUS
Status: DISCONTINUED | OUTPATIENT
Start: 2018-02-27 | End: 2018-02-27 | Stop reason: HOSPADM

## 2018-02-27 RX ORDER — IOPAMIDOL 755 MG/ML
91 INJECTION, SOLUTION INTRAVASCULAR ONCE
Status: COMPLETED | OUTPATIENT
Start: 2018-02-27 | End: 2018-02-27

## 2018-02-27 RX ORDER — ONDANSETRON 2 MG/ML
4 INJECTION INTRAMUSCULAR; INTRAVENOUS EVERY 30 MIN PRN
Status: DISCONTINUED | OUTPATIENT
Start: 2018-02-27 | End: 2018-02-27 | Stop reason: HOSPADM

## 2018-02-27 RX ADMIN — IOPAMIDOL 91 ML: 755 INJECTION, SOLUTION INTRAVENOUS at 14:47

## 2018-02-27 RX ADMIN — SODIUM CHLORIDE 110 ML: 9 INJECTION, SOLUTION INTRAVENOUS at 14:47

## 2018-02-27 RX ADMIN — SODIUM CHLORIDE 1000 ML: 9 INJECTION, SOLUTION INTRAVENOUS at 14:31

## 2018-02-27 NOTE — ED AVS SNAPSHOT
[facility] Emergency Department    [address] [address]    Phone:  [phone]    Fax:  [phone]                                       Elly Choe   MRN: [id number]    Department:  [facility] Emergency Department   Date of Visit:  2/27/2018           After Visit Summary Signature Page     I have received my discharge instructions, and my questions have been answered. I have discussed any challenges I see with this plan with the nurse or doctor.    ..........................................................................................................................................  Patient/Patient Representative Signature      ..........................................................................................................................................  Patient Representative Print Name and Relationship to Patient    ..................................................               ................................................  Date                                            Time    ..........................................................................................................................................  Reviewed by Signature/Title    ...................................................              ..............................................  Date                                                            Time

## 2018-02-27 NOTE — ED AVS SNAPSHOT
[facility]    [address] [address]    Phone:  [phone]    Fax:  [phone]                                       Elly Choe   MRN: [id number]    Department:  [facility]   Date of Visit:  2/27/2018           Patient Information     Date Of Birth          1935        Your diagnoses for this visit were:     Malaise     Generalized muscle weakness        You were seen by Virgil Plasencia MD.      Follow-up Information     Schedule an appointment as soon as possible for a visit with Joy Kam APRN CNP.    Specialty:  Nurse Practitioner - Family    Why:  As needed, If symptoms worsen    Contact information:    [address] [place] [address]  [phone]          Discharge Instructions       Push fluids, rest.  Pain medication as directed previously.  Return to the emergency department if worse or changes.  Follow-up in clinic as planned.    24 Hour Appointment Hotline       To make an appointment at any [facility], call [phone] ([phone]). If you don't have a family doctor or clinic, we will help you find one. [facility] are conveniently located to serve the needs of you and your family.             Review of your medicines      Our records show that you are taking the medicines listed below. If these are incorrect, please call your family doctor or clinic.        Dose / Directions Last dose taken    allopurinol 100 MG tablet   Commonly known as:  ZYLOPRIM   Quantity:  90 tablet        TAKE ONE TABLET BY MOUTH EVERY DAY   Refills:  3        aspirin 325 MG tablet   Dose:  325 mg   Quantity:  40 tablet        Take 1 tablet (325 mg) by mouth daily   Refills:  0        levothyroxine 75 MCG tablet   Commonly known as:  SYNTHROID/LEVOTHROID   Dose:  75 mcg   Quantity:  90 tablet        Take 1 tablet (75 mcg) by mouth daily   Refills:  3        multivitamin, therapeutic with minerals  Tabs tablet   Dose:  1 tablet   Quantity:  100 tablet        Take 1 tablet by mouth daily   Refills:  3        TYLENOL PO   Dose:  325-650 mg        Take 325-650 mg by mouth every 4 hours as needed for mild pain or fever   Refills:  0                Procedures and tests performed during your visit     CBC with platelets differential    CT Chest Pulmonary Embolism w Contrast    Comprehensive metabolic panel    EKG 12-lead, tracing only    INR    Lipase    Partial thromboplastin time    Troponin I    UA with Microscopic reflex to Culture      Orders Needing Specimen Collection     None      Pending Results     Date and Time Order Name Status Description    2/27/2018 1327 UA with Microscopic reflex to Culture In process             Pending Culture Results     Date and Time Order Name Status Description    2/27/2018 1327 UA with Microscopic reflex to Culture In process             Pending Results Instructions     If you had any lab results that were not finalized at the time of your Discharge, you can call the ED Lab Result RN at [phone]. You will be contacted by this team for any positive Lab results or changes in treatment. The nurses are available 7 days a week from 10A to 6:30P.  You can leave a message 24 hours per day and they will return your call.        Test Results From Your Hospital Stay        2/27/2018  2:47 PM      Component Results     Component Value Ref Range & Units Status    WBC 7.3 4.0 - 11.0 10e9/L Final    RBC Count 3.76 (L) 3.8 - 5.2 10e12/L Final    Hemoglobin 12.1 11.7 - 15.7 g/dL Final    Hematocrit 35.2 35.0 - 47.0 % Final    MCV 94 78 - 100 fl Final    MCH 32.2 26.5 - 33.0 pg Final    MCHC 34.4 31.5 - 36.5 g/dL Final    RDW 13.1 10.0 - 15.0 % Final    Platelet Count 210 150 - 450 10e9/L Final    Diff Method Automated Method  Final    % Neutrophils 73.8 % Final    % Lymphocytes 13.4 % Final    % Monocytes 10.4 % Final    % Eosinophils 1.9 % Final    % Basophils 0.4 % Final    % Immature  Granulocytes 0.1 % Final    Absolute Neutrophil 5.4 1.6 - 8.3 10e9/L Final    Absolute Lymphocytes 1.0 0.8 - 5.3 10e9/L Final    Absolute Monocytes 0.8 0.0 - 1.3 10e9/L Final    Absolute Eosinophils 0.1 0.0 - 0.7 10e9/L Final    Absolute Basophils 0.0 0.0 - 0.2 10e9/L Final    Abs Immature Granulocytes 0.0 0 - 0.4 10e9/L Final         2/27/2018  3:07 PM      Component Results     Component Value Ref Range & Units Status    INR 1.16 (H) 0.86 - 1.14 Final         2/27/2018  3:07 PM      Component Results     Component Value Ref Range & Units Status    PTT 38 (H) 22 - 37 sec Final         2/27/2018  3:14 PM      Component Results     Component Value Ref Range & Units Status    Sodium 127 (L) 133 - 144 mmol/L Final    Potassium 4.2 3.4 - 5.3 mmol/L Final    Chloride 92 (L) 94 - 109 mmol/L Final    Carbon Dioxide 26 20 - 32 mmol/L Final    Anion Gap 9 3 - 14 mmol/L Final    Glucose 108 (H) 70 - 99 mg/dL Final    Urea Nitrogen 11 7 - 30 mg/dL Final    Creatinine 1.12 (H) 0.52 - 1.04 mg/dL Final    GFR Estimate 47 (L) >60 mL/min/1.7m2 Final    Non  GFR Calc    GFR Estimate If Black 56 (L) >60 mL/min/1.7m2 Final    African American GFR Calc    Calcium 8.9 8.5 - 10.1 mg/dL Final    Bilirubin Total 1.1 0.2 - 1.3 mg/dL Final    Albumin 3.5 3.4 - 5.0 g/dL Final    Protein Total 6.6 (L) 6.8 - 8.8 g/dL Final    Alkaline Phosphatase 91 40 - 150 U/L Final    ALT 16 0 - 50 U/L Final    AST 19 0 - 45 U/L Final         2/27/2018  3:14 PM      Component Results     Component Value Ref Range & Units Status    Lipase 146 73 - 393 U/L Final         2/27/2018  4:06 PM         2/27/2018  3:23 PM      Narrative     CT CHEST PULMONARY EMBOLISM WITH CONTRAST February 27, 2018 3:04 PM     HISTORY: Soreness of breath, recent surgery, rule out pulmonary  embolism.     TECHNIQUE: CT of the chest was performed following the administration  of 91mL Isovue-370. Radiation dose for this scan was reduced using  automated exposure  "control, adjustment of the mA and/or kV according  to patient size, or iterative reconstruction technique.    COMPARISON: None.    FINDINGS: No evidence for a pulmonary embolism. Minimal bibasilar  atelectasis. No definite acute infiltrate. COPD changes. 5 mm nodule  in the right lower lobe on image 71 series 6.        Impression     IMPRESSION:  1. No evidence for pulmonary embolism.  2. Tiny lung nodule in the right lower lobe. This measures 5 mm.    Recommendations for one or multiple incidental lung nodules < 6mm :    Low risk patients: No routine follow-up.    High risk patients: Optional follow-up CT at 12 months; if  unchanged, no further follow-up.    *Low Risk: Minimal or absent history of smoking or other known risk  factors.  *Nonsolid (ground glass) or partly solid nodules may require longer  follow-up to exclude indolent adenocarcinoma.  *Recommendations based on Guidelines for the Management of Incidental  Pulmonary Nodules Detected at CT: From the Fleischner Society 2017,  Radiology 2017.    SKYLAR HAMMER MD         2/27/2018  3:14 PM      Component Results     Component Value Ref Range & Units Status    Troponin I ES <0.015 0.000 - 0.045 ug/L Final    The 99th percentile for upper reference range is 0.045 ug/L.  Troponin values   in the range of 0.045 - 0.120 ug/L may be associated with risks of adverse   clinical events.                  Thank you for choosing [facility]       Thank you for choosing [facility] for your care. Our goal is always to provide you with excellent care. Hearing back from our patients is one way we can continue to improve our services. Please take a few minutes to complete the written survey that you may receive in the mail after you visit with us. Thank you!        [employer] Information     [employer] lets you send messages to your doctor, view your test results, renew your prescriptions, schedule appointments and more. To sign up, go to [email or web address] . Click on \"Log " "in\" on the left side of the screen, which will take you to the Welcome page. Then click on \"Sign up Now\" on the right side of the page.     You will be asked to enter the access code listed below, as well as some personal information. Please follow the directions to create your username and password.     Your access code is: [email or web address]  Expires: 2018 11:23 AM     Your access code will  in 90 days. If you need help or a new code, please call your [place] clinic or [phone].        Care EveryWhere ID     This is your Care EveryWhere ID. This could be used by other organizations to access your [place] medical records  [id number]        Equal Access to Services     MASON VICENTE : Gianfranco Harrington, alejandro zhang, evan ramos, isidro finn . So [facility] [phone].    ATENCIÓN: Si habla español, tiene a willis disposición servicios gratuitos de asistencia lingüística. Llame al [phone].    We comply with applicable federal civil rights laws and [place] laws. We do not discriminate on the basis of race, color, national origin, age, disability, sex, sexual orientation, or gender identity.            After Visit Summary       This is your record. Keep this with you and show to your community pharmacist(s) and doctor(s) at your next visit.                  "

## 2018-02-27 NOTE — ED PROVIDER NOTES
History     Chief Complaint   Patient presents with     Generalized Weakness     HPI  Elly Choe is a 82 year old female, past medical history significant for postop anemia, status post right total hip replacement 1/31/18, osteopenia, rheumatoid arthritis, ASCVD, generalized anxiety disorder, osteoarthritis, hypothyroidism, stage III kidney disease, retention, hyperlipidemia, COPD, morbid obesity, GERD, presents the emergency department with concerns of generalized weakness.  Due to memory deficits on the part of the patient history taking is difficult and the patient often repeats or changes her story several times during history taking.  She states that she is felt very rundown, tired, with intermittent central chest discomfort not correlating with any attempted exercise or exertion over the past several days.  Anorexia, low-grade nausea.  No vomiting.  She cannot recall her last bowel movement.  She does not recall any difficulties passing urine.  She denied abdominal pain.  No fever chills or sweats.  No URI type symptoms.  The patient most recently has had right hip replacement on 1/31/18 and had been in TCU care up until 2/21/18 when she was discharged to a senior's apartment accommodation.        Problem List:    Patient Active Problem List    Diagnosis Date Noted     Postoperative anemia 02/02/2018     Priority: Medium     Status post total replacement of right hip 01/31/2018     Priority: Medium     Degenerative joint disease (DJD) of hip 01/31/2018     Priority: Medium     Osteopenia 03/03/2015     Priority: Medium     Feb 2015 dexa with mostly mild osteopenia.  Repeat in 3-5 yrs.       FH: rheumatoid arthritis 02/12/2015     Priority: Medium     Negative RF, DEVON, CCP 3/26/13  Never tested, but mother had RA and Elly has classic hand deformities.  She does not want to hear about it.       Coronary artery disease involving native coronary artery of native heart without angina pectoris  06/18/2014     Priority: Medium     4/15/2014  Cardiac catheterization-2 drug eluting stents placed in left main artery, moderated disease in LAD and circumflex, mild RCA, normal EF  On Brilinta for one year (dcd 4/28/15)           Health Care Home 04/17/2014     Priority: Medium       Status:  No Services Needed  Care Coordinator:  Yaa Nelson    See Letters for [facility] Care Plan  Date:  February 9, 2016               Generalized anxiety disorder 09/08/2013     Priority: Medium     Primary localized osteoarthrosis, lower leg 12/26/2012     Priority: Medium     DJD of shoulder 09/26/2012     Priority: Medium     Acquired hypothyroidism 09/12/2012     Priority: Medium     Advanced directives, counseling/discussion 04/12/2012     Priority: Medium     Advance Directive received and scanned. Click on Code in the patient header to view. 4/12/2012     DNI/DNR         CKD (chronic kidney disease) stage 3, GFR 30-59 ml/min 03/30/2011     Priority: Medium     Idiopathic chronic gout of multiple sites without tophus 06/03/2007     Priority: Medium     Josselin 3, 2007: right great toe with elevated uric acid, started on -ALLOPURINOL 100 MG daily. recheck uric acid 2 week after starting medication and titate as needed.       surgical menopause ([facility]) for non-cancerous reasons 01/08/2007     Priority: Medium     Age 41       Hemangioma 05/17/2005     Priority: Medium     Area on chest subcutaneous between breast tissue       Benign essential hypertension 05/17/2005     Priority: Medium     Hyperlipidemia LDL goal <100 10/31/2010     Priority: Low     Chronic airway obstruction (H) 01/13/2008     Priority: Low     9/22/2010:She has been told there is lung damage from smoking over the years but has not been limited by any respiratory symptoms.  Not using any inhalers.        Morbid obesity due to excess calories (H) 01/08/2007     Priority: Low     Problem list name updated by automated process. Provider to review        Gastroesophageal reflux disease without esophagitis 05/17/2005     Priority: Low        Past Medical History:    Past Medical History:   Diagnosis Date     ACS (acute coronary syndrome) (H) 4/14/2014     Acute dermatitis due to solar radiation 9/9/2010     Anemia 12/6/2012     Diverticulitis of colon 5/17/2005     HX OF COMPOUND HEMANGIOMA NOS [228.00]      HX OF SUPERFICIAL PHLEBITIS      Phlebitis and thrombophlebitis of superficial vessels of lower extremities 10/2/2012     S/P knee replacement left 1/2/2013     S/P PTCA (percutaneous transluminal coronary angioplasty) 6/18/2014     Status post shoulder joint replacement 10/5/2012     Status post total shoulder arthroplasty, left 5/4/2016       Past Surgical History:    Past Surgical History:   Procedure Laterality Date     ARTHROPLASTY HIP Right 1/31/2018    Procedure: ARTHROPLASTY HIP;  Right total hip arthroplasty. ;  Surgeon: Sen Parry MD;  Location: [facility]     ARTHROPLASTY KNEE  4/13/2011    Procedure:ARTHROPLASTY KNEE; Surgeon:SEN PARRY; Location:[facility]     ARTHROPLASTY KNEE  12/26/2012    Procedure: ARTHROPLASTY KNEE;  Left Total Knee Arthroplasty;  Surgeon: Sen Parry MD;  Location: [facility]     ARTHROPLASTY SHOULDER  9/26/2012    Procedure: ARTHROPLASTY SHOULDER;  Right total shoulder arthroplasty;  Surgeon: Sen Parry MD;  Location: [facility]     ARTHROPLASTY SHOULDER Left 5/4/2016    Procedure: ARTHROPLASTY SHOULDER;  Surgeon: Sen Parry MD;  Location: [facility]     ARTHROSCOPY KNEE IRRIGATION AND DEBRIDEMENT  12/10/2010    ARTHROSCOPY KNEE IRRIGATION AND DEBRIDEMENT performed by LEY, JEFFREY DUANE at [facility]     CATARACT IOL, RT/LT  4/2010    bilateral     COLONOSCOPY  2002     COLONOSCOPY  6/19/2014    Procedure: COLONOSCOPY;  Surgeon: Steve Deng MD;  Location: [facility]     CORONARY ANGIOGRAPHY ADULT ORDER       HYSTERECTOMY, SARAH  1976    Total abdominal hysterectomy & bilateral salpingectomy oophorectomy     TONSILLECTOMY  & ADENOIDECTOMY      as child       Family History:    Family History   Problem Relation Age of Onset     Alcohol/Drug Father      DIABETES Brother      DIABETES Brother      Arthritis Mother 20     Other - See Comments Daughter      Fallopian tube infection 06/2015       Social History:  Marital Status:  Single [1]  Social History   Substance Use Topics     Smoking status: Former Smoker     Quit date: 1/1/1970     Smokeless tobacco: Never Used      Comment: 20 year hx of smoking 3 packs of cigarettes daily; quit 1970     Alcohol use No        Medications:      aspirin 325 MG tablet   allopurinol (ZYLOPRIM) 100 MG tablet   Acetaminophen (TYLENOL PO)   levothyroxine (SYNTHROID/LEVOTHROID) 75 MCG tablet   multivitamin, therapeutic with minerals (MULTI-VITAMIN) TABS         Review of Systems   All other systems reviewed and are negative.      Physical Exam   BP: 146/75  Heart Rate: 79  Temp: 98.9  F (37.2  C)  Resp: 16  SpO2: 99 %      Physical Exam   Constitutional: She appears well-developed and well-nourished.   HENT:   Head: Normocephalic and atraumatic.   Right Ear: External ear normal.   Left Ear: External ear normal.   Nose: Nose normal.   Mouth/Throat: Oropharynx is clear and moist.   Eyes: Conjunctivae and EOM are normal. Pupils are equal, round, and reactive to light.   Neck: Normal range of motion. Neck supple.   Cardiovascular: Normal rate, regular rhythm, normal heart sounds and intact distal pulses.    Pulmonary/Chest: Effort normal and breath sounds normal.   Abdominal: Soft. Bowel sounds are normal.   Musculoskeletal: Normal range of motion.   Neurological: She is alert.   Skin: Skin is warm and dry.   Psychiatric: She has a normal mood and affect. Her behavior is normal.   Nursing note and vitals reviewed.      ED Course     ED Course     Procedures          EKG interpretation by Dr. Virgil Plasencia  Interpretation at 13:15 p.m.  Sinus rhythm 78 bpm no acute ST-T wave changes.  Normal axis normal  intervals.  Normal EKG.      Results for orders placed or performed during the hospital encounter of 02/27/18   CT Chest Pulmonary Embolism w Contrast    Narrative    CT CHEST PULMONARY EMBOLISM WITH CONTRAST February 27, 2018 3:04 PM     HISTORY: Soreness of breath, recent surgery, rule out pulmonary  embolism.     TECHNIQUE: CT of the chest was performed following the administration  of 91mL Isovue-370. Radiation dose for this scan was reduced using  automated exposure control, adjustment of the mA and/or kV according  to patient size, or iterative reconstruction technique.    COMPARISON: None.    FINDINGS: No evidence for a pulmonary embolism. Minimal bibasilar  atelectasis. No definite acute infiltrate. COPD changes. 5 mm nodule  in the right lower lobe on image 71 series 6.      Impression    IMPRESSION:  1. No evidence for pulmonary embolism.  2. Tiny lung nodule in the right lower lobe. This measures 5 mm.    Recommendations for one or multiple incidental lung nodules < 6mm :    Low risk patients: No routine follow-up.    High risk patients: Optional follow-up CT at 12 months; if  unchanged, no further follow-up.    *Low Risk: Minimal or absent history of smoking or other known risk  factors.  *Nonsolid (ground glass) or partly solid nodules may require longer  follow-up to exclude indolent adenocarcinoma.  *Recommendations based on Guidelines for the Management of Incidental  Pulmonary Nodules Detected at CT: From the Fleischner Society 2017,  Radiology 2017.    SKYLAR HAMMER MD       Critical Care time:  none               Labs Ordered and Resulted from Time of ED Arrival Up to the Time of Departure from the ED   CBC WITH PLATELETS DIFFERENTIAL - Abnormal; Notable for the following:        Result Value    RBC Count 3.76 (*)     All other components within normal limits   INR - Abnormal; Notable for the following:     INR 1.16 (*)     All other components within normal limits   PARTIAL THROMBOPLASTIN TIME  - Abnormal; Notable for the following:     PTT 38 (*)     All other components within normal limits   COMPREHENSIVE METABOLIC PANEL - Abnormal; Notable for the following:     Sodium 127 (*)     Chloride 92 (*)     Glucose 108 (*)     Creatinine 1.12 (*)     GFR Estimate 47 (*)     GFR Estimate If Black 56 (*)     Protein Total 6.6 (*)     All other components within normal limits   LIPASE   ROUTINE UA WITH MICROSCOPIC REFLEX TO CULTURE   TROPONIN I     4:35 PM lab diagnostics resulted thus far and imaging studies are reviewed with the patient and discussed.  The pending urinalysis.  The patient would like to go as she has a limited window of availability for a ride home from here.  She feels comfortable going home.  If her urine is resulted with significant findings she is requested prescription be sent to [facility] [place].  The patient's urinalysis was unfortunately contaminated with 3 squamous epithelial cells, there were no significant findings for infection in this context.  Doubt UTI.    Assessments & Plan (with Medical Decision Making)   82-year-old female past medical history reviewed as above who presents to the emergency department with concerns of generalized weakness, lack of energy as discussed in the HPI.  Physical exam finds her alert no acute distress.  No focal findings on exam.  Differential diagnosis of her entrance complaint and associated symptoms especially with the chest pain in the postoperative setting with her right hip prosthesis necessarily must include consideration of pulmonary emboli and for that reason the patient had CT of the chest performed which was negative for acute findings especially, or embolism.  EKG was normal and without evidence of ischemia or infarct.  Lab diagnostics show a low sodium of 127 low chloride of 92 glucose of 108 creatinine of 1.12.  Lipase negative, urinalysis was contaminated with squamous epithelial but the patient did not have any  urinary tract symptoms.  She remained comfortable and stable with respect to vital signs the entire time in the emergency department.  Do not find an obvious explanation for her delays and generalized muscle weakness other than being in a postoperative state with advanced age.  I reassured her this regard, she was amenable to home disposition and will be picked up by her friend.  Return if not improving or worse.  Otherwise follow-up in clinic as planned and with orthopedics as planned per    Disclaimer: This note consists of symbols derived from keyboarding, dictation and/or voice recognition software. As a result, there may be errors in the script that have gone undetected. Please consider this when interpreting information found in this chart.      I have reviewed the nursing notes.    I have reviewed the findings, diagnosis, plan and need for follow up with the patient.            New Prescriptions    No medications on file       Final diagnoses:   Malaise   Generalized muscle weakness       2/27/2018   [facility] EMERGENCY DEPARTMENT     Virgil Plasencia MD  02/28/18 [id number]

## 2018-02-27 NOTE — DISCHARGE INSTRUCTIONS
Push fluids, rest.  Pain medication as directed previously.  Return to the emergency department if worse or changes.  Follow-up in clinic as planned.

## 2018-02-27 NOTE — ED NOTES
"Pt brought to ED by EMS with concerns of feeling \"blah\". Pt had hip surgery on 1/31 and was released from TCU into independent living apartments about 1 week ago. Today she began feeling \"blah\" this consists of feeling weak, low energy, decreased appetite, some abdominal (epigastric) pain (worse last night).   "

## 2018-02-28 ENCOUNTER — TELEPHONE (OUTPATIENT)
Dept: FAMILY MEDICINE | Facility: CLINIC | Age: 83
End: 2018-02-28

## 2018-02-28 ENCOUNTER — DOCUMENTATION ONLY (OUTPATIENT)
Dept: CARE COORDINATION | Facility: CLINIC | Age: 83
End: 2018-02-28

## 2018-02-28 ENCOUNTER — CARE COORDINATION (OUTPATIENT)
Dept: CARE COORDINATION | Facility: CLINIC | Age: 83
End: 2018-02-28

## 2018-02-28 NOTE — PROGRESS NOTES
Clinic Care Coordination Contact  Care Team Conversations  D/I: Per chart review, note that patient is currently being followed by [facility]. This RN CC placed a call to RN who requested orders: Breonna Becker [phone]. Left VM asking for return call to this RN CC today or to Ambreen Bah RN CC who will be back tomorrow.  P: RN CC will await return call and follow up as needed.    Gee AVITIAN,RN- BC  Clinic Care Coordinator  [facility]  Phone: [phone]

## 2018-02-28 NOTE — PROGRESS NOTES
Clinic Care Coordination Contact  OUTREACH    Referral Information:  Referral Source: IP/TCU Report  Reason for Contact: ED F/U  Care Conference: No     Universal Utilization:   ED Visits in last year: 4  Hospital visits in last year: 1  Last PCP appointment: 02/26/18  Multiple Providers or Specialists: Orthopedic    Clinical Concerns:  Current Medical Concerns: Patient was hospitalized at [facility] from 1/31/18 to 2/3/18 with diagnosis of R MARY ANNE. Then went to TCU at the  [facility] from 2/3/18-2/21/18. HC ordered on DC from TCU. Her regular RN CC followed up with her after TCU discharge and no needs noted. Patient followed up with PCP on 2/26. On 2/27 was brought to ED by EMS with C/O malaise and weakness. Per ED provider note:  MELITON  Elly Choe is a 82 year old female, past medical history significant for postop anemia, status post right total hip replacement 1/31/18, osteopenia, rheumatoid arthritis, ASCVD, generalized anxiety disorder, osteoarthritis, hypothyroidism, stage III kidney disease, retention, hyperlipidemia, COPD, morbid obesity, GERD, presents the emergency department with concerns of generalized weakness.  Due to memory deficits on the part of the patient history taking is difficult and the patient often repeats or changes her story several times during history taking.  She states that she is felt very rundown, tired, with intermittent central chest discomfort not correlating with any attempted exercise or exertion over the past several days.  Anorexia, low-grade nausea.  No vomiting.  She cannot recall her last bowel movement.  She does not recall any difficulties passing urine.  She denied abdominal pain.  No fever chills or sweats.  No URI type symptoms.  The patient most recently has had right hip replacement on 1/31/18 and had been in TCU care up until 2/21/18 when she was discharged to a senior's apartment accommodation.     Assessments & Plan (with Medical Decision Making)   82-year-old  female past medical history reviewed as above who presents to the emergency department with concerns of generalized weakness, lack of energy as discussed in the HPI.  Physical exam finds her alert no acute distress.  No focal findings on exam.  Differential diagnosis of her entrance complaint and associated symptoms especially with the chest pain in the postoperative setting with her right hip prosthesis necessarily must include consideration of pulmonary emboli and for that reason the patient had CT of the chest performed which was negative for acute findings especially, or embolism.  EKG was normal and without evidence of ischemia or infarct.  Lab diagnostics show a low sodium of 127 low chloride of 92 glucose of 108 creatinine of 1.12.  Lipase negative, urinalysis was contaminated with squamous epithelial but the patient did not have any urinary tract symptoms.  She remained comfortable and stable with respect to vital signs the entire time in the emergency department.  Do not find an obvious explanation for her delays and generalized muscle weakness other than being in a postoperative state with advanced age.  I reassured her this regard, she was amenable to home disposition and will be picked up by her friend.  Return if not improving or worse.  Otherwise follow-up in clinic as planned and with orthopedics as planned per (note unfinished).    It appears that there were no significant findings so patient was discharged home in the company of a friend.However, provider did refer to confusion in the note.     Current Behavioral Concerns:None    Education Provided to patient: Patient would not allow-hung up    Medication Management:  Unable to discuss. Patient hung up     Functional Status:  Mobility Status: Independent w/Device  Equipment Currently Used at Home: grab bar, shower chair, walker, rolling  Transportation: a friend apparently drove her home from ED  Transportation Agency: [place]  [employer]  Transportation Contact Info: [employer] [phone]     Psychosocial:  Current living arrangement: I live alone  Financial/Insurance:No concerns     Resources and Interventions:  Current Resources: Home Care; Transportation Services, Home Care  Advanced Care Plans/Directives on file: No    Patient/Caregiver understanding: Patient reports she is \"hanging in there\". Did not want to discuss ED visit or current status. Stated, \" I am so tired of you people being after me like dogs.\" Asked it she would like to have us not call anymore. She paused and then said \"for now.\" Went on to say, \"I ate chocolate and now you know what I have to do.\" I said what is that and she said \"dig it out with my hands. That's the problem with you people you don't even know these things.\" Explained that other patients may do something different in this situation, such as vomit. She then said,\"Well I just don't vomit very easy like others.\"  Began to ask about symptoms she is having when she said, \"I'm done, Good Bye\" and hung up.    Frequency of Care Coordination: in 1 week  Upcoming appointment: None    Plan: This RN CC covering for patient's regular CC, Ambreen Bah. Will leave further outreach to her discretion.    Gee PICKETT,RN- BC  Clinic Care Coordinator  [facility]  Phone: [phone]            "

## 2018-02-28 NOTE — PROGRESS NOTES
[facility] now requests orders and shares plan of care/discharge summaries for some patients through [employer].  Please REPLY TO THIS MESSAGE in order to give authorization for orders when needed.  This is considered a verbal order, you will still receive a faxed copy of orders for signature.  Thank you for your assistance in improving collaboration for our patients.    ORDER    Skilled OT 1 w 1; 2 w 4 for ADL retraining, there ex and cognitive assessments for safety.

## 2018-03-06 ENCOUNTER — TELEPHONE (OUTPATIENT)
Dept: FAMILY MEDICINE | Facility: CLINIC | Age: 83
End: 2018-03-06

## 2018-03-08 ENCOUNTER — ALLIED HEALTH/NURSE VISIT (OUTPATIENT)
Dept: FAMILY MEDICINE | Facility: CLINIC | Age: 83
End: 2018-03-08
Payer: COMMERCIAL

## 2018-03-08 ENCOUNTER — DOCUMENTATION ONLY (OUTPATIENT)
Dept: CARE COORDINATION | Facility: CLINIC | Age: 83
End: 2018-03-08

## 2018-03-08 DIAGNOSIS — F41.1 GENERALIZED ANXIETY DISORDER: Primary | ICD-10-CM

## 2018-03-08 PROCEDURE — 99207 ZZC NO CHARGE NURSE ONLY: CPT | Performed by: NURSE PRACTITIONER

## 2018-03-08 NOTE — PROGRESS NOTES
[facility] now requests orders and shares plan of care/discharge summaries for some patients through [employer].  Please REPLY TO THIS MESSAGE in order to give authorization for orders when needed.  This is considered a verbal order, you will still receive a faxed copy of orders for signature.  Thank you for your assistance in improving collaboration for our patients.        MD SUMMARY/PLAN OF CARE    S: Home Care visit completed on 3/8/2018  B: Patient receives home care for post op orthopedic concerns  A: Patient has been refusing home care visits from PT, PTA, Nursing and OT. Stating I just want to be left alone. Today 3/8 RN finally was able to make a visit with the help and encouragement from PCA who was also present.  Patient reports since being home she has not felt like herself. Says everything is just terrible.  Unable to really describe what was wrong but just feels so off.  On 2/27 OT sent patient into ER with similar symptoms along with epigastric pain. Full workup was completed with no new dx. Patient states I am just so discouraged.  RN completed PHQ-9 as below  PHQ 9/PATIENT HEALTH QUESTIONNAIRE   Depression Screen     Over the last two weeks, how often have you been bothered by the following problems      0 if not at all/ 1 if several days/2 if more than half the days/3 if nearly every day    1.  Little interest or pleasure in doing things 3  2.  Feeling down, depressed or hopeless 3  3.  Trouble falling or staying asleep, or sleeping too much 3  4.  Feeling tired or having little energy 3  5.  Poor appetite or overeating 3  6.  Feeling bad about yourself, or that you are a failure or have let yourself or your family down 3  7.  Trouble concentrating on things, such as reading the newspaper or watching television 3  8.  Moving or speaking so slowly that other people could have noticed. Or the opposite  being so fidgety or restless that you have been moving around a lot more than unusual  3  9.  Thoughts that you would be better off dead, or of hurting yourself in some way 3  10. If you checked off any problems, how difficult have these problems made it for you to do your work, take care of things at home, or get along with others? Somewhat difficult    Not difficult at all    Somewhat difficult  Very difficult  Extremely difficult    Total Score... 27    Score of 1 to 4      minimal depression  Score of 5 to 9      mild depression  Score of 10 to 14 moderate depression  Score of 15 to 19 moderately severe depression  Score of 20 to 27 severe depression    Denies any thoughts or plans of self harm or suicide   R: What recommendations do you have for her? RN advised patient to be seen in clinic as soon as she is able.

## 2018-03-12 ENCOUNTER — OFFICE VISIT (OUTPATIENT)
Dept: FAMILY MEDICINE | Facility: CLINIC | Age: 83
End: 2018-03-12
Payer: COMMERCIAL

## 2018-03-12 ENCOUNTER — TELEPHONE (OUTPATIENT)
Dept: FAMILY MEDICINE | Facility: CLINIC | Age: 83
End: 2018-03-12

## 2018-03-12 VITALS
TEMPERATURE: 98.3 F | WEIGHT: 221 LBS | HEIGHT: 63 IN | HEART RATE: 76 BPM | DIASTOLIC BLOOD PRESSURE: 72 MMHG | SYSTOLIC BLOOD PRESSURE: 118 MMHG | OXYGEN SATURATION: 95 % | BODY MASS INDEX: 39.16 KG/M2

## 2018-03-12 DIAGNOSIS — F43.21 ADJUSTMENT DISORDER WITH DEPRESSED MOOD: Primary | ICD-10-CM

## 2018-03-12 PROCEDURE — 99213 OFFICE O/P EST LOW 20 MIN: CPT | Performed by: NURSE PRACTITIONER

## 2018-03-12 ASSESSMENT — ANXIETY QUESTIONNAIRES
6. BECOMING EASILY ANNOYED OR IRRITABLE: SEVERAL DAYS
2. NOT BEING ABLE TO STOP OR CONTROL WORRYING: SEVERAL DAYS
7. FEELING AFRAID AS IF SOMETHING AWFUL MIGHT HAPPEN: NOT AT ALL
3. WORRYING TOO MUCH ABOUT DIFFERENT THINGS: SEVERAL DAYS
1. FEELING NERVOUS, ANXIOUS, OR ON EDGE: SEVERAL DAYS
5. BEING SO RESTLESS THAT IT IS HARD TO SIT STILL: NOT AT ALL
GAD7 TOTAL SCORE: 4

## 2018-03-12 ASSESSMENT — PATIENT HEALTH QUESTIONNAIRE - PHQ9: 5. POOR APPETITE OR OVEREATING: NOT AT ALL

## 2018-03-12 NOTE — PROGRESS NOTES
SUBJECTIVE:   Elly Choe is a 82 year old female who presents to clinic today for the following health issues:      Abnormal Mood Symptoms  Onset: 2 weeks     Description:   Depression: YES - feeling discouraged   Anxiety: YES    Accompanying Signs & Symptoms:  Still participating in activities that you used to enjoy: no  Fatigue: YES  Irritability: no  Difficulty concentrating: YES  Changes in appetite: YES   Problems with sleep: YES- sleeping more  Heart racing/beating fast : no  Thoughts of hurting yourself or others: none    History:   Recent stress: YES- hip surgery   Prior depression hospitalization: None  Family history of depression: no  Family history of anxiety: no    Precipitating factors:   Alcohol/drug use: no    Alleviating factors:  None     Therapies Tried and outcome: None     PHQ-9 SCORE 3/1/2016 8/16/2016 3/12/2018   Total Score - - -   Total Score 17 3 11     HELIO-7 SCORE 2/11/2014 3/1/2016 3/12/2018   Total Score 0 - -   Total Score - 10 4     Was sick and feeling really bad for a few weeks-now improving along with moods  States that she has always been reagan but was much worse will illness and transitioning back home from [facility].  Has a lot of support with neighbor who is present today-she also reports feeling that she is doing much better over the past week  Having trouble getting out of her chair and couch-per home care.  Has raisers on her furniture but they are unstable and not helping.  Home care has serious safety concerns about these.  Has a friend that lives next door that can help her but she is not always available.  No family in the area for support.      Problem list and histories reviewed & adjusted, as indicated.  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     Chronic airway obstruction (H)     Hyperlipidemia LDL goal <100     CKD (chronic kidney disease) stage 3, GFR 30-59 ml/min     Advanced directives, counseling/discussion     Acquired hypothyroidism     DJD of shoulder     Primary localized osteoarthrosis, lower leg     Generalized anxiety disorder     Health Care Home     Coronary artery disease involving native coronary artery of native heart without angina pectoris     FH: rheumatoid arthritis     Osteopenia     Status post total replacement of right hip     Degenerative joint disease (DJD) of hip     Postoperative anemia     Past Surgical History:   Procedure Laterality Date     ARTHROPLASTY HIP Right 1/31/2018    Procedure: ARTHROPLASTY HIP;  Right total hip arthroplasty. ;  Surgeon: Sen Parry MD;  Location: [facility]     ARTHROPLASTY KNEE  4/13/2011    Procedure:ARTHROPLASTY KNEE; Surgeon:SEN PARRY; Location:[facility]     ARTHROPLASTY KNEE  12/26/2012    Procedure: ARTHROPLASTY KNEE;  Left Total Knee Arthroplasty;  Surgeon: Sen Parry MD;  Location: [facility]     ARTHROPLASTY SHOULDER  9/26/2012    Procedure: ARTHROPLASTY SHOULDER;  Right total shoulder arthroplasty;  Surgeon: Sen Parry MD;  Location: [facility]     ARTHROPLASTY SHOULDER Left 5/4/2016    Procedure: ARTHROPLASTY SHOULDER;  Surgeon: Sen Parry MD;  Location: [facility]     ARTHROSCOPY KNEE IRRIGATION AND DEBRIDEMENT  12/10/2010    ARTHROSCOPY KNEE IRRIGATION AND DEBRIDEMENT performed by LEY, JEFFREY DUANE at [facility]     CATARACT IOL, RT/LT  4/2010    bilateral     COLONOSCOPY  2002     COLONOSCOPY  6/19/2014    Procedure: COLONOSCOPY;  Surgeon: Steve Deng MD;  Location: [facility]     CORONARY ANGIOGRAPHY ADULT ORDER       HYSTERECTOMY, SARAH  1976    Total abdominal hysterectomy & bilateral salpingectomy oophorectomy     TONSILLECTOMY & ADENOIDECTOMY      as child       Social History  "  Substance Use Topics     Smoking status: Former Smoker     Quit date: 1/1/1970     Smokeless tobacco: Never Used      Comment: 20 year hx of smoking 3 packs of cigarettes daily; quit 1970     Alcohol use No     Family History   Problem Relation Age of Onset     Alcohol/Drug Father      DIABETES Brother      DIABETES Brother      Arthritis Mother 20     Other - See Comments Daughter      Fallopian tube infection 06/2015         Current Outpatient Prescriptions   Medication Sig Dispense Refill     aspirin 325 MG tablet Take 1 tablet (325 mg) by mouth daily 40 tablet 0     allopurinol (ZYLOPRIM) 100 MG tablet TAKE ONE TABLET BY MOUTH EVERY DAY 90 tablet 3     Acetaminophen (TYLENOL PO) Take 325-650 mg by mouth every 4 hours as needed for mild pain or fever        levothyroxine (SYNTHROID/LEVOTHROID) 75 MCG tablet Take 1 tablet (75 mcg) by mouth daily 90 tablet 3     multivitamin, therapeutic with minerals (MULTI-VITAMIN) TABS Take 1 tablet by mouth daily 100 tablet 3     Allergies   Allergen Reactions     Sulfa Drugs Other (See Comments)     Causes burning when urinates. Causes yeast infections.     Labs reviewed in EPIC    Reviewed and updated as needed this visit by clinical staff  Tobacco  Allergies  Med Hx  Surg Hx  Fam Hx  Soc Hx      Reviewed and updated as needed this visit by Provider         ROS:  Constitutional, HEENT, cardiovascular, pulmonary, gi and gu systems are negative, except as otherwise noted.    OBJECTIVE:     /72 (Cuff Size: Adult Regular)  Pulse 76  Temp 98.3  F (36.8  C) (Tympanic)  Ht 5' 3\" (1.6 m)  Wt 221 lb (100.2 kg)  LMP 01/14/2013  SpO2 95%  BMI 39.15 kg/m2  Body mass index is 39.15 kg/(m^2).  GENERAL: healthy, alert and no distress  RESP: lungs clear to auscultation - no rales, rhonchi or wheezes  CV: regular rate and rhythm, normal S1 S2, no S3 or S4, no murmur, click or rub  ABDOMEN: soft, nontender, and bowel sounds normal  PSYCH: mentation appears normal, affect " normal/bright    Diagnostic Test Results:  none     ASSESSMENT/PLAN:     1. Adjustment disorder with depressed mood  Improving over the past week with improvement in health.  Will recheck in 2-4 weeks, consider starting a medication if symptoms not continuing to improve.    Home care instructions were reviewed with the patient. The risks, benefits and treatment options of prescribed medications or other treatments have been discussed with the patient. The patient verbalized their understanding and should call or follow up if no improvement or if they develop further problems.      ROSA Patel [facility]

## 2018-03-12 NOTE — MR AVS SNAPSHOT
"              After Visit Summary   3/12/2018    Elly Choe    MRN: [id number]           Patient Information     Date Of Birth          1935        Visit Information        Provider Department      3/12/2018 10:00 AM Joy Kam APRN CNP [facility]        Today's Diagnoses     Adjustment disorder with depressed mood    -  1       Follow-ups after your visit        Who to contact     If you have questions or need follow up information about today's clinic visit or your schedule please contact [facility] directly at [phone].  Normal or non-critical lab and imaging results will be communicated to you by Goojitsuhart, letter or phone within 4 business days after the clinic has received the results. If you do not hear from us within 7 days, please contact the clinic through Goojitsuhart or phone. If you have a critical or abnormal lab result, we will notify you by phone as soon as possible.  Submit refill requests through Rawbots or call your pharmacy and they will forward the refill request to us. Please allow 3 business days for your refill to be completed.          Additional Information About Your Visit        MyChart Information     Rawbots lets you send messages to your doctor, view your test results, renew your prescriptions, schedule appointments and more. To sign up, go to [email or web address] . Click on \"Log in\" on the left side of the screen, which will take you to the Welcome page. Then click on \"Sign up Now\" on the right side of the page.     You will be asked to enter the access code listed below, as well as some personal information. Please follow the directions to create your username and password.     Your access code is: [email or web address]  Expires: 2018 12:23 PM     Your access code will  in 90 days. If you need help or a new code, please call your [facility] or [phone].        Care EveryWhere ID     This is your Care " "EveryWhere ID. This could be used by other organizations to access your [facility] medical records  [id number]        Your Vitals Were     Pulse Temperature Height Last Period Pulse Oximetry BMI (Body Mass Index)    76 98.3  F (36.8  C) (Tympanic) 5' 3\" (1.6 m) 01/14/2013 95% 39.15 kg/m2       Blood Pressure from Last 3 Encounters:   03/12/18 118/72   02/27/18 (!) 155/112   02/26/18 112/72    Weight from Last 3 Encounters:   03/12/18 221 lb (100.2 kg)   02/26/18 227 lb (103 kg)   02/19/18 238 lb (108 kg)              Today, you had the following     No orders found for display       Primary Care Provider Office Phone # Fax #    Joy Kam ROSA [phone] [address] [address]        Equal Access to Services     MASON VICENTE : Hadii wendy araizao Soalbina, waaxda luqadaha, qaybta kaalmada adeegyada, isidro finn . So [facility] [phone].    ATENCIÓN: Si huyenla español, tiene a willis disposición servicios gratuitos de asistencia lingüística. Llame al [phone].    We comply with applicable federal civil rights laws and [place] laws. We do not discriminate on the basis of race, color, national origin, age, disability, sex, sexual orientation, or gender identity.            Thank you!     Thank you for choosing [facility]  for your care. Our goal is always to provide you with excellent care. Hearing back from our patients is one way we can continue to improve our services. Please take a few minutes to complete the written survey that you may receive in the mail after your visit with us. Thank you!             Your Updated Medication List - Protect others around you: Learn how to safely use, store and throw away your medicines at www.disposemymeds.org.          This list is accurate as of 3/12/18 12:18 PM.  Always use your most recent med list.                   Brand Name Dispense Instructions for use Diagnosis    " allopurinol 100 MG tablet    ZYLOPRIM    90 tablet    TAKE ONE TABLET BY MOUTH EVERY DAY    Gouty arthropathy       aspirin 325 MG tablet     40 tablet    Take 1 tablet (325 mg) by mouth daily    Primary localized osteoarthrosis of right lower leg       levothyroxine 75 MCG tablet    SYNTHROID/LEVOTHROID    90 tablet    Take 1 tablet (75 mcg) by mouth daily    Acquired hypothyroidism       multivitamin, therapeutic with minerals Tabs tablet     100 tablet    Take 1 tablet by mouth daily    Anemia, unspecified anemia type       TYLENOL PO      Take 325-650 mg by mouth every 4 hours as needed for mild pain or fever

## 2018-03-13 ASSESSMENT — ANXIETY QUESTIONNAIRES: GAD7 TOTAL SCORE: 4

## 2018-03-13 ASSESSMENT — PATIENT HEALTH QUESTIONNAIRE - PHQ9
SUM OF ALL RESPONSES TO PHQ QUESTIONS 1-9: 27
SUM OF ALL RESPONSES TO PHQ QUESTIONS 1-9: 11

## 2018-03-15 ENCOUNTER — TELEPHONE (OUTPATIENT)
Dept: FAMILY MEDICINE | Facility: CLINIC | Age: 83
End: 2018-03-15

## 2018-03-15 ENCOUNTER — TRANSFERRED RECORDS (OUTPATIENT)
Dept: HEALTH INFORMATION MANAGEMENT | Facility: CLINIC | Age: 83
End: 2018-03-15

## 2018-03-19 ENCOUNTER — MEDICAL CORRESPONDENCE (OUTPATIENT)
Dept: HEALTH INFORMATION MANAGEMENT | Facility: CLINIC | Age: 83
End: 2018-03-19

## 2018-03-20 ENCOUNTER — TELEPHONE (OUTPATIENT)
Dept: FAMILY MEDICINE | Facility: CLINIC | Age: 83
End: 2018-03-20

## 2018-03-20 PROCEDURE — G0180 MD CERTIFICATION HHA PATIENT: HCPCS | Performed by: FAMILY MEDICINE

## 2018-03-20 NOTE — TELEPHONE ENCOUNTER
Home Care [facility] Home Health Certification and Plan of Care to Dr Anaya to sign in Dr Lofton's absence.

## 2018-04-12 ENCOUNTER — TRANSFERRED RECORDS (OUTPATIENT)
Dept: HEALTH INFORMATION MANAGEMENT | Facility: CLINIC | Age: 83
End: 2018-04-12

## 2018-04-12 NOTE — PROGRESS NOTES
SUBJECTIVE:   Elly Choe is a 82 year old female who presents for Preventive Visit.    Are you in the first 12 months of your Medicare Part B coverage?  No    Healthy Habits:    Do you get at least three servings of calcium containing foods daily (dairy, green leafy vegetables, etc.)? yes    Amount of exercise or daily activities, outside of work: 0 day(s) per week    Problems taking medications regularly No    Medication side effects: No    Have you had an eye exam in the past two years? no    Do you see a dentist twice per year? no    Do you have sleep apnea, excessive snoring or daytime drowsiness?no      Ability to successfully perform activities of daily living: No, needs assistance with: transportation, housework and laundry    Home safety:  none identified     Hearing impairment: No    Fall risk:  Fall Risk Assessment not completed.    COGNITIVE SCREEN  1) Repeat 3 items (Banana, Sunrise, Chair)    2) Clock draw: NORMAL  3) 3 item recall: Recalls 3 objects  Results: 3 items recalled: COGNITIVE IMPAIRMENT LESS LIKELY    Mini-CogTM Copyright CIARA Martin. Licensed by the author for use in [facility]; reprinted with permission (soob@[facility]). All rights reserved.        Joint Pain    Onset: 2 days     Description:   Location: back  Character: Sharp    Intensity: mild    Progression of Symptoms: same    Accompanying Signs & Symptoms:  Other symptoms: none    History:   Previous similar pain: YES      Precipitating factors:   Trauma or overuse: no     Alleviating factors:  Improved by: nothing    Therapies Tried and outcome: none    Improving, does not hurt as long as she is talking.    Reviewed and updated as needed this visit by clinical staff  Tobacco  Allergies  Meds  Med Hx  Surg Hx  Fam Hx  Soc Hx        Reviewed and updated as needed this visit by Provider        Social History   Substance Use Topics     Smoking status: Former Smoker     Quit date: 1/1/1970     Smokeless  "tobacco: Never Used      Comment: 20 year hx of smoking 3 packs of cigarettes daily; quit 1970     Alcohol use No       If you drink alcohol do you typically have >3 drinks per day or >7 drinks per week? No                        Today's PHQ-2 Score:   PHQ-2 ( 1999 Pf[employer]) 4/13/2018 3/1/2016   Q1: Little interest or pleasure in doing things 0 0   Q2: Feeling down, depressed or hopeless 0 0   PHQ-2 Score 0 0       Do you feel safe in your environment - Yes    Do you have a Health Care Directive?: No: Advance care planning was reviewed with patient; patient declined at this time.    Current providers sharing in care for this patient include:   Patient Care Team:  Joy Kam APRN CNP as PCP - General (Nurse Practitioner - Family)  Ambreen Bah RN as Clinic Care Coordinator  [facility]  Sen Parry MD as MD (Orthopedics)    The following health maintenance items are reviewed in Epic and correct as of today:  Health Maintenance   Topic Date Due     COPD ACTION PLAN Q1 YR  01/18/1936     DEPRESSION ACTION PLAN Q1 YR  02/11/2015     ADVANCE DIRECTIVE PLANNING Q5 YRS  04/12/2017     FALL RISK ASSESSMENT  05/23/2018     MICROALBUMIN Q1 YEAR  05/23/2018     LIPID MONITORING Q1 YEAR  06/01/2018     INFLUENZA VACCINE (SYSTEM ASSIGNED)  09/01/2018     PHQ-9 Q6 MONTHS  09/12/2018     TSH Q1 YEAR  12/19/2018     TETANUS IMMUNIZATION (SYSTEM ASSIGNED)  12/29/2018     BMP Q1 YR  02/27/2019     HEMOGLOBIN Q1 YR  02/27/2019     SPIROMETRY ONETIME  Completed     DEXA SCAN SCREENING (SYSTEM ASSIGNED)  Completed     PNEUMOCOCCAL  Completed     Labs reviewed in EPIC      ROS:  Constitutional, HEENT, cardiovascular, pulmonary, GI, , musculoskeletal, neuro, skin, endocrine and psych systems are negative, except as otherwise noted.    OBJECTIVE:   Temp 98  F (36.7  C) (Tympanic)  Ht 5' 3\" (1.6 m)  Wt 223 lb (101.2 kg)  LMP 01/14/2013 " " BMI 39.5 kg/m2 Estimated body mass index is 39.5 kg/(m^2) as calculated from the following:    Height as of this encounter: 5' 3\" (1.6 m).    Weight as of this encounter: 223 lb (101.2 kg).  EXAM:   GENERAL APPEARANCE: healthy, alert and no distress  EYES: Eyes grossly normal to inspection, PERRL and conjunctivae and sclerae normal  HENT: ear canals and TM's normal, nose and mouth without ulcers or lesions, oropharynx clear and oral mucous membranes moist  NECK: no adenopathy, no asymmetry, masses, or scars and thyroid normal to palpation  RESP: lungs clear to auscultation - no rales, rhonchi or wheezes  BREAST: normal without masses, tenderness or nipple discharge and no palpable axillary masses or adenopathy  CV: regular rate and rhythm, normal S1 S2, no S3 or S4, no murmur, click or rub, no peripheral edema and peripheral pulses strong  ABDOMEN: soft, nontender, no hepatosplenomegaly, no masses and bowel sounds normal  MS: no musculoskeletal defects are noted, tenderness to palpation at right para lumbar spinal area  SKIN: no suspicious lesions or rashes  NEURO: Normal strength and tone, sensory exam grossly normal, mentation intact and speech normal  PSYCH: mentation appears normal and affect normal/bright    ASSESSMENT / PLAN:   1. Routine general medical examination at a health care facility  No concerns today.      2. Lumbar paraspinal muscle spasm  Minimal symptoms present in clinic.  Symptomatic care and follow up discussed.      End of Life Planning:  Patient currently has an advanced directive: DNR/DNI.  Practitioner is supportive of decision.    COUNSELING:  Reviewed preventive health counseling, as reflected in patient instructions        Estimated body mass index is 39.5 kg/(m^2) as calculated from the following:    Height as of this encounter: 5' 3\" (1.6 m).    Weight as of this encounter: 223 lb (101.2 kg).  Weight management plan: Discussed healthy diet and exercise guidelines and patient will " follow up in 12 months in clinic to re-evaluate.     reports that she quit smoking about 48 years ago. She has never used smokeless tobacco.      Appropriate preventive services were discussed with this patient, including applicable screening as appropriate for cardiovascular disease, diabetes, osteopenia/osteoporosis, and glaucoma.  As appropriate for age/gender, discussed screening for colorectal cancer, prostate cancer, breast cancer, and cervical cancer. Checklist reviewing preventive services available has been given to the patient.    Reviewed patients plan of care and provided an AVS. The Basic Care Plan (routine screening as documented in Health Maintenance) for Elly meets the Care Plan requirement. This Care Plan has been established and reviewed with the Patient.    Counseling Resources:  ATP IV Guidelines  Pooled Cohorts Equation Calculator  Breast Cancer Risk Calculator  FRAX Risk Assessment  ICSI Preventive Guidelines  Dietary Guidelines for Americans, 2010  USDA's MyPlate  ASA Prophylaxis  Lung CA Screening    ROSA Patel [facility]

## 2018-04-13 ENCOUNTER — OFFICE VISIT (OUTPATIENT)
Dept: FAMILY MEDICINE | Facility: CLINIC | Age: 83
End: 2018-04-13
Payer: COMMERCIAL

## 2018-04-13 VITALS
BODY MASS INDEX: 39.51 KG/M2 | WEIGHT: 223 LBS | HEIGHT: 63 IN | TEMPERATURE: 98 F | DIASTOLIC BLOOD PRESSURE: 78 MMHG | SYSTOLIC BLOOD PRESSURE: 110 MMHG | HEART RATE: 70 BPM

## 2018-04-13 DIAGNOSIS — Z00.00 ROUTINE GENERAL MEDICAL EXAMINATION AT A HEALTH CARE FACILITY: Primary | ICD-10-CM

## 2018-04-13 DIAGNOSIS — M62.830 LUMBAR PARASPINAL MUSCLE SPASM: ICD-10-CM

## 2018-04-13 PROCEDURE — 99397 PER PM REEVAL EST PAT 65+ YR: CPT | Performed by: NURSE PRACTITIONER

## 2018-04-13 NOTE — PATIENT INSTRUCTIONS
Preventive Health Recommendations  Female Ages 65 +    Yearly exam:     See your health care provider every year in order to  o Review health changes.   o Discuss preventive care.    o Review your medicines if your doctor has prescribed any.      You no longer need a yearly Pap test unless you've had an abnormal Pap test in the past 10 years. If you have vaginal symptoms, such as bleeding or discharge, be sure to talk with your provider about a Pap test.      Every 1 to 2 years, have a mammogram.  If you are over 69, talk with your health care provider about whether or not you want to continue having screening mammograms.      Every 10 years, have a colonoscopy. Or, have a yearly FIT test (stool test). These exams will check for colon cancer.       Have a cholesterol test every 5 years, or more often if your doctor advises it.       Have a diabetes test (fasting glucose) every three years. If you are at risk for diabetes, you should have this test more often.       At age 65, have a bone density scan (DEXA) to check for osteoporosis (brittle bone disease).    Shots:    Get a flu shot each year.    Get a tetanus shot every 10 years.    Talk to your doctor about your pneumonia vaccines. There are now two you should receive - Pneumovax (PPSV 23) and Prevnar (PCV 13).    Talk to your doctor about the shingles vaccine.    Talk to your doctor about the hepatitis B vaccine.    Nutrition:     Eat at least 5 servings of fruits and vegetables each day.      Eat whole-grain bread, whole-wheat pasta and brown rice instead of white grains and rice.      Talk to your provider about Calcium and Vitamin D.     Lifestyle    Exercise at least 150 minutes a week (30 minutes a day, 5 days a week). This will help you control your weight and prevent disease.      Limit alcohol to one drink per day.      No smoking.       Wear sunscreen to prevent skin cancer.       See your dentist twice a year for an exam and cleaning.    See your eye  doctor every 1 to 2 years to screen for conditions such as glaucoma, macular degeneration, cataracts, etc   Back Sprain or Strain    Injury to the muscles (strain) or ligaments (sprain) around the spine can be troubling. Injury may occur after a sudden forceful twisting or bending force such as in a car accident, after a simple awkward movement, or after lifting something heavy with poor body positioning. In any case, muscle spasm is often present and adds to the pain.  Thankfully, most people feel better in 1 to 2 weeks, and most of the rest in 1 to 2 months. Most people can remain active. Unless you had a forceful or traumatic physical injury such as a car accident or fall, X-rays may not be ordered for the first evaluation of a back sprain or strain. If pain continues and does not respond to medical treatment, your healthcare provider may then order X-rays and other tests.  Home care  The following guidelines will help you care for your injury at home:  When in bed, try to find a comfortable position. A firm mattress is best. Try lying flat on your back with pillows under your knees. You can also try lying on your side with your knees bent up toward your chest and a pillow between your knees.  Don't sit for long periods. Try not to take long car rides or take other trips that have you sitting for a long time. This puts more stress on the lower back than standing or walking.  During the first 24 to 72 hours after an injury or flare-up, apply an ice pack to the painful area for 20 minutes. Then remove it for 20 minutes. Do this for 60 to 90 minutes, or several times a day. This will reduce swelling and pain. Be sure to wrap the ice pack in a thin towel or plastic to protect your skin.  You can start with ice, then switch to heat. Heat from a hot shower, hot bath, or heating pad reduces pain and works well for muscle spasms. Put heat on the painful area for 20 minutes, then remove for 20 minutes. Do this for 60 to  90 minutes, or several times a day. Do not use a heating pad while sleeping. It can burn the skin.  You can alternate the ice and heat. Talk with your healthcare provider to find out the best treatment or therapy for your back pain.  Therapeutic massage will help relax the back muscles without stretching them.  Be aware of safe lifting methods. Do not lift anything over 15 pounds until all of the pain is gone.  Medicines  Talk to your healthcare provider before using medicines, especially if you have other health problems or are taking other medicines.  You may use acetaminophen or ibuprofen to control pain, unless another pain medicine was prescribed. If you have chronic conditions like diabetes, liver or kidney disease, stomach ulcers, or gastrointestinal bleeding, or are taking blood-thinner medicines, talk with your doctor before taking any medicines.  Be careful if you are given prescription medicines, narcotics, or medicine for muscle spasm. They can cause drowsiness, and affect your coordination, reflexes, and judgment. Do not drive or operate heavy machinery when taking these types of medicines. Only take pain medicine as prescribed by your healthcare provider.  Follow-up care  Follow up with your healthcare provider, or as advised. You may need physical therapy or more tests if your symptoms get worse.  If you had X-rays your healthcare provider may be checking for any broken bones, breaks, or fractures. Bruises and sprains can sometimes hurt as much as a fracture. These injuries can take time to heal completely. If your symptoms don t improve or they get worse, talk with your healthcare provider. You may need a repeat X-ray or other tests.  Call 911  Call for emergency care if any of the following occur:  Trouble breathing  Confused  Very drowsy or trouble awakening  Fainting or loss of consciousness  Rapid or very slow heart rate  Loss of bowel or bladder control  When to seek medical advice  Call your  healthcare provider right away if any of the following occur:  Pain gets worse or spreads to your arms or legs  Weakness or numbness in one or both arms or legs  Numbness in the groin or genital area  Date Last Reviewed: 6/1/2016 2000-2017 [employer]. [address] [place] [address]. All rights reserved. This information is not intended as a substitute for professional medical care. Always follow your healthcare professional's instructions.

## 2018-04-13 NOTE — MR AVS SNAPSHOT
After Visit Summary   4/13/2018    Elly Choe    MRN: [id number]           Patient Information     Date Of Birth          1935        Visit Information        Provider Department      4/13/2018 11:00 AM Joy Kam APRN CNP [facility]        Today's Diagnoses     Routine general medical examination at a health care facility    -  1      Care Instructions      Preventive Health Recommendations  Female Ages 65 +    Yearly exam:     See your health care provider every year in order to  o Review health changes.   o Discuss preventive care.    o Review your medicines if your doctor has prescribed any.      You no longer need a yearly Pap test unless you've had an abnormal Pap test in the past 10 years. If you have vaginal symptoms, such as bleeding or discharge, be sure to talk with your provider about a Pap test.      Every 1 to 2 years, have a mammogram.  If you are over 69, talk with your health care provider about whether or not you want to continue having screening mammograms.      Every 10 years, have a colonoscopy. Or, have a yearly FIT test (stool test). These exams will check for colon cancer.       Have a cholesterol test every 5 years, or more often if your doctor advises it.       Have a diabetes test (fasting glucose) every three years. If you are at risk for diabetes, you should have this test more often.       At age 65, have a bone density scan (DEXA) to check for osteoporosis (brittle bone disease).    Shots:    Get a flu shot each year.    Get a tetanus shot every 10 years.    Talk to your doctor about your pneumonia vaccines. There are now two you should receive - Pneumovax (PPSV 23) and Prevnar (PCV 13).    Talk to your doctor about the shingles vaccine.    Talk to your doctor about the hepatitis B vaccine.    Nutrition:     Eat at least 5 servings of fruits and vegetables each day.      Eat whole-grain bread, whole-wheat pasta and brown  rice instead of white grains and rice.      Talk to your provider about Calcium and Vitamin D.     Lifestyle    Exercise at least 150 minutes a week (30 minutes a day, 5 days a week). This will help you control your weight and prevent disease.      Limit alcohol to one drink per day.      No smoking.       Wear sunscreen to prevent skin cancer.       See your dentist twice a year for an exam and cleaning.    See your eye doctor every 1 to 2 years to screen for conditions such as glaucoma, macular degeneration, cataracts, etc   Back Sprain or Strain    Injury to the muscles (strain) or ligaments (sprain) around the spine can be troubling. Injury may occur after a sudden forceful twisting or bending force such as in a car accident, after a simple awkward movement, or after lifting something heavy with poor body positioning. In any case, muscle spasm is often present and adds to the pain.  Thankfully, most people feel better in 1 to 2 weeks, and most of the rest in 1 to 2 months. Most people can remain active. Unless you had a forceful or traumatic physical injury such as a car accident or fall, X-rays may not be ordered for the first evaluation of a back sprain or strain. If pain continues and does not respond to medical treatment, your healthcare provider may then order X-rays and other tests.  Home care  The following guidelines will help you care for your injury at home:  When in bed, try to find a comfortable position. A firm mattress is best. Try lying flat on your back with pillows under your knees. You can also try lying on your side with your knees bent up toward your chest and a pillow between your knees.  Don't sit for long periods. Try not to take long car rides or take other trips that have you sitting for a long time. This puts more stress on the lower back than standing or walking.  During the first 24 to 72 hours after an injury or flare-up, apply an ice pack to the painful area for 20 minutes. Then  remove it for 20 minutes. Do this for 60 to 90 minutes, or several times a day. This will reduce swelling and pain. Be sure to wrap the ice pack in a thin towel or plastic to protect your skin.  You can start with ice, then switch to heat. Heat from a hot shower, hot bath, or heating pad reduces pain and works well for muscle spasms. Put heat on the painful area for 20 minutes, then remove for 20 minutes. Do this for 60 to 90 minutes, or several times a day. Do not use a heating pad while sleeping. It can burn the skin.  You can alternate the ice and heat. Talk with your healthcare provider to find out the best treatment or therapy for your back pain.  Therapeutic massage will help relax the back muscles without stretching them.  Be aware of safe lifting methods. Do not lift anything over 15 pounds until all of the pain is gone.  Medicines  Talk to your healthcare provider before using medicines, especially if you have other health problems or are taking other medicines.  You may use acetaminophen or ibuprofen to control pain, unless another pain medicine was prescribed. If you have chronic conditions like diabetes, liver or kidney disease, stomach ulcers, or gastrointestinal bleeding, or are taking blood-thinner medicines, talk with your doctor before taking any medicines.  Be careful if you are given prescription medicines, narcotics, or medicine for muscle spasm. They can cause drowsiness, and affect your coordination, reflexes, and judgment. Do not drive or operate heavy machinery when taking these types of medicines. Only take pain medicine as prescribed by your healthcare provider.  Follow-up care  Follow up with your healthcare provider, or as advised. You may need physical therapy or more tests if your symptoms get worse.  If you had X-rays your healthcare provider may be checking for any broken bones, breaks, or fractures. Bruises and sprains can sometimes hurt as much as a fracture. These injuries can take  "time to heal completely. If your symptoms don t improve or they get worse, talk with your healthcare provider. You may need a repeat X-ray or other tests.  Call 911  Call for emergency care if any of the following occur:  Trouble breathing  Confused  Very drowsy or trouble awakening  Fainting or loss of consciousness  Rapid or very slow heart rate  Loss of bowel or bladder control  When to seek medical advice  Call your healthcare provider right away if any of the following occur:  Pain gets worse or spreads to your arms or legs  Weakness or numbness in one or both arms or legs  Numbness in the groin or genital area  Date Last Reviewed: 6/1/2016 2000-2017 [employer]. [address] [place] [address]. All rights reserved. This information is not intended as a substitute for professional medical care. Always follow your healthcare professional's instructions.                  Follow-ups after your visit        Who to contact     If you have questions or need follow up information about today's clinic visit or your schedule please contact [facility] directly at [phone].  Normal or non-critical lab and imaging results will be communicated to you by [employer], letter or phone within 4 business days after the clinic has received the results. If you do not hear from us within 7 days, please contact the clinic through [employer] or phone. If you have a critical or abnormal lab result, we will notify you by phone as soon as possible.  Submit refill requests through [employer] or call your pharmacy and they will forward the refill request to us. Please allow 3 business days for your refill to be completed.          Additional Information About Your Visit        Megadynehart Information     [employer] lets you send messages to your doctor, view your test results, renew your prescriptions, schedule appointments and more. To sign up, go to [email or web address] . Click on \"Log in\" on the " "left side of the screen, which will take you to the Welcome page. Then click on \"Sign up Now\" on the right side of the page.     You will be asked to enter the access code listed below, as well as some personal information. Please follow the directions to create your username and password.     Your access code is: [email or web address]  Expires: 2018 11:06 AM     Your access code will  in 90 days. If you need help or a new code, please call your [facility] clinic or [phone].        Care EveryWhere ID     This is your Care EveryWhere ID. This could be used by other organizations to access your [facility] medical records  [id number]        Your Vitals Were     Pulse Temperature Height Last Period BMI (Body Mass Index)       70 98  F (36.7  C) (Tympanic) 5' 3\" (1.6 m) 2013 39.5 kg/m2        Blood Pressure from Last 3 Encounters:   18 110/78   18 118/72   18 (!) 155/112    Weight from Last 3 Encounters:   18 223 lb (101.2 kg)   18 221 lb (100.2 kg)   18 227 lb (103 kg)              Today, you had the following     No orders found for display       Primary Care Provider Office Phone # Fax #    Joy Kam ROSA [phone]       [address] [id number]        Equal Access to Services     JACOB VICENTE : Hadii aad ku hadasho Sominhali, waaxda luqadaha, qaybta kaalmada adeconstanza, isidro finn . So [facility] [phone].    ATENCIÓN: Si huyenla gregorio, tiene a willis disposición servicios gratuitos de asistencia lingüística. Josue al [phone].    We comply with applicable federal civil rights laws and [place] laws. We do not discriminate on the basis of race, color, national origin, age, disability, sex, sexual orientation, or gender identity.            Thank you!     Thank you for choosing [facility]  for your care. Our goal is always to provide you with excellent care. Hearing back from our " patients is one way we can continue to improve our services. Please take a few minutes to complete the written survey that you may receive in the mail after your visit with us. Thank you!             Your Updated Medication List - Protect others around you: Learn how to safely use, store and throw away your medicines at www.disposemymeds.org.          This list is accurate as of 4/13/18 11:06 AM.  Always use your most recent med list.                   Brand Name Dispense Instructions for use Diagnosis    allopurinol 100 MG tablet    ZYLOPRIM    90 tablet    TAKE ONE TABLET BY MOUTH EVERY DAY    Gouty arthropathy       levothyroxine 75 MCG tablet    SYNTHROID/LEVOTHROID    90 tablet    Take 1 tablet (75 mcg) by mouth daily    Acquired hypothyroidism       multivitamin, therapeutic with minerals Tabs tablet     100 tablet    Take 1 tablet by mouth daily    Anemia, unspecified anemia type       TYLENOL PO      Take 325-650 mg by mouth every 4 hours as needed for mild pain or fever

## 2018-04-13 NOTE — NURSING NOTE
"Chief Complaint   Patient presents with     Shingles     Physical       Initial /78  Pulse 70  Temp 98  F (36.7  C) (Tympanic)  Ht 5' 3\" (1.6 m)  Wt 223 lb (101.2 kg)  LMP 01/14/2013  BMI 39.5 kg/m2 Estimated body mass index is 39.5 kg/(m^2) as calculated from the following:    Height as of this encounter: 5' 3\" (1.6 m).    Weight as of this encounter: 223 lb (101.2 kg).  Medication Reconciliation: complete   DIOGENES Tolliver      "

## 2018-04-19 ENCOUNTER — PATIENT OUTREACH (OUTPATIENT)
Dept: CARE COORDINATION | Facility: CLINIC | Age: 83
End: 2018-04-19

## 2018-04-19 ASSESSMENT — ACTIVITIES OF DAILY LIVING (ADL): DEPENDENT_IADLS:: CLEANING;TRANSPORTATION

## 2018-04-19 NOTE — PROGRESS NOTES
Clinic Care Coordination Contact    OUTREACH    Referral Information:  Referral Source: IP Report    Primary Diagnosis: Orthopedic    Chief Complaint   Patient presents with     Clinic Care Coordination - Follow-up     Nurse: f/u        Universal Utilization:   Utilization    Last refreshed: 4/19/2018  9:51 AM:  No Show Count (past year) 0       Last refreshed: 4/19/2018  9:51 AM:  ED visits 3       Last refreshed: 4/19/2018  9:51 AM:  Hospital admissions 1          Current as of: 4/19/2018  9:51 AM             Clinical Concerns:  Current Medical Concerns:  RN NIRANJAN spoke with patient, she states she is doing fine. She states she is packing because she is moving to [place] to live with her daughter and son-in-law. She states she is very excited about this but will miss everyone here. She denies having any questions or concerns for RN CC.    Current Behavioral Concerns: No concerns at this time.    Education Provided to patient: n/a   Clinical Pathway Name: None  Health Maintenance Reviewed: Due/Overdue   Health Maintenance Due   Topic Date Due     COPD ACTION PLAN Q1 YR  01/18/1936     DEPRESSION ACTION PLAN Q1 YR  02/11/2015     ADVANCE DIRECTIVE PLANNING Q5 YRS  04/12/2017     INFLUENZA VACCINE (1) 09/01/2017         Medication Management:  Patient is independent in medication management. Pt. verbalized adherence and understanding of medication regimen, side effects, purposes.     Functional Status:  Mobility Status: Independent w/Device  Equipment Currently Used at Home: grab bar, shower chair, walker, rolling  Transportation:  Transportation means:: Friend, Regular car, Public transportation     Psychosocial:  Current living arrangement:: I live in a private home  Type of residence::  (Lives in Elderly house with other adults)  Financial/Insurance: No concerns at this time.       Resources and Interventions:  Current Resources: Home Health Aid; Housekeeping/Chore Agency  Advanced Care Plans/Directives on file::  No        Goals:   n/a      Patient/Caregiver understanding: na/          Plan:   Patient will continue to follow treatment plan as directed and follow up with PCP with concerns ongoing.   RN CC will f/u in 2-3 weeks since she said she might be moving the 3rd week in May.    Ambreen Bah RN, [facility]  RN Care Coordinator  [facility]  Phone # [phone]  4/19/2018 11:37 AM

## 2018-04-30 ENCOUNTER — OFFICE VISIT (OUTPATIENT)
Dept: FAMILY MEDICINE | Facility: CLINIC | Age: 83
End: 2018-04-30
Payer: COMMERCIAL

## 2018-04-30 VITALS
BODY MASS INDEX: 39.51 KG/M2 | HEIGHT: 63 IN | HEART RATE: 72 BPM | SYSTOLIC BLOOD PRESSURE: 140 MMHG | TEMPERATURE: 98.6 F | DIASTOLIC BLOOD PRESSURE: 72 MMHG | WEIGHT: 223 LBS

## 2018-04-30 DIAGNOSIS — N89.8 VAGINAL DISCHARGE: ICD-10-CM

## 2018-04-30 DIAGNOSIS — B96.89 BACTERIAL VAGINOSIS: Primary | ICD-10-CM

## 2018-04-30 DIAGNOSIS — R39.89 URINARY PROBLEM: ICD-10-CM

## 2018-04-30 DIAGNOSIS — N76.0 BACTERIAL VAGINOSIS: Primary | ICD-10-CM

## 2018-04-30 LAB
ALBUMIN UR-MCNC: NEGATIVE MG/DL
APPEARANCE UR: CLEAR
BILIRUB UR QL STRIP: NEGATIVE
COLOR UR AUTO: YELLOW
GLUCOSE UR STRIP-MCNC: NEGATIVE MG/DL
HGB UR QL STRIP: NEGATIVE
KETONES UR STRIP-MCNC: ABNORMAL MG/DL
LEUKOCYTE ESTERASE UR QL STRIP: NEGATIVE
NITRATE UR QL: NEGATIVE
PH UR STRIP: 6.5 PH (ref 5–7)
SOURCE: ABNORMAL
SP GR UR STRIP: 1.01 (ref 1–1.03)
SPECIMEN SOURCE: ABNORMAL
UROBILINOGEN UR STRIP-ACNC: 0.2 EU/DL (ref 0.2–1)
WET PREP SPEC: ABNORMAL

## 2018-04-30 PROCEDURE — 99213 OFFICE O/P EST LOW 20 MIN: CPT | Performed by: NURSE PRACTITIONER

## 2018-04-30 PROCEDURE — 87210 SMEAR WET MOUNT SALINE/INK: CPT | Performed by: NURSE PRACTITIONER

## 2018-04-30 PROCEDURE — 81003 URINALYSIS AUTO W/O SCOPE: CPT | Performed by: NURSE PRACTITIONER

## 2018-04-30 RX ORDER — METRONIDAZOLE 500 MG/1
500 TABLET ORAL 2 TIMES DAILY
Qty: 14 TABLET | Refills: 0 | Status: SHIPPED | OUTPATIENT
Start: 2018-04-30

## 2018-04-30 NOTE — PATIENT INSTRUCTIONS
Flagyl sent to the pharmacy for symptoms.    Symptomatic care and follow up discussed  Vaginal Infection: Bacterial Vaginosis  Both good and bad bacteria are present in a healthy vagina. Bacterial vaginosis (BV) occurs when these bacteria get out of balance. The numbers of good bacteria decrease. This allows the numbers of bad bacteria to increase and cause BV. In most cases, BV is not a serious problem.  Causes of bacterial vaginosis  The cause of BV is not clear. Douching may lead to it. Having sex with a new partner or more than 1 partner makes it more likely.  Symptoms of bacterial vaginosis  Symptoms of BV vary for each woman. Some women have few symptoms or none at all. If symptoms are present, they can include:    Thin, milky white or gray or sometimes green discharge    Unpleasant  fishy  odor    Irritation, itching, and burning at opening of vagina which may indicate mixed vaginitis      Burning or irritation with sex or urination which may indicate mixed vaginitis  Diagnosing bacterial vaginosis  Your healthcare provider will ask about your symptoms and health history. He or she will also do a pelvic exam. This is an exam of your vagina and cervix. A sample of vaginal fluid or discharge may be taken. This sample is checked for signs of BV.  Treating bacterial vaginosis  BV is often treated with antibiotics. They may be given in oral pill form or as a vaginal cream. To use these medicines:    Be sure to take all of your medicine, even if your symptoms go away.    If you re taking antibiotic pills, do not drink alcohol until you re finished with all of your medicine.    If you re using vaginal cream, apply it as directed. Be aware that the cream may make condoms and diaphragms less effective.    Call your healthcare provider if symptoms do not go away within 4 days of starting treatment. Also call if you have a reaction to the medicine.  Why treatment matters  Even if you have no symptoms or your symptoms  are mild, BV should be treated. Untreated BV can lead to health problems such as:    Increased risk of  delivery if you re pregnant    Increased risk of complications after surgery on the reproductive organs    Possible increased risk of pelvic inflammatory disease (PID)   Date Last Reviewed: 3/1/2017    4235-5636 [employer]. [address] [place] [address]. All rights reserved. This information is not intended as a substitute for professional medical care. Always follow your healthcare professional's instructions.

## 2018-04-30 NOTE — MR AVS SNAPSHOT
After Visit Summary   4/30/2018    Elly Choe    MRN: [id number]           Patient Information     Date Of Birth          1935        Visit Information        Provider Department      4/30/2018 2:00 PM Joy Kam APRN [facility]        Today's Diagnoses     Urinary problem    -  1    Vaginal discharge        Bacterial vaginosis          Care Instructions    Flagyl sent to the pharmacy for symptoms.    Symptomatic care and follow up discussed  Vaginal Infection: Bacterial Vaginosis  Both good and bad bacteria are present in a healthy vagina. Bacterial vaginosis (BV) occurs when these bacteria get out of balance. The numbers of good bacteria decrease. This allows the numbers of bad bacteria to increase and cause BV. In most cases, BV is not a serious problem.  Causes of bacterial vaginosis  The cause of BV is not clear. Douching may lead to it. Having sex with a new partner or more than 1 partner makes it more likely.  Symptoms of bacterial vaginosis  Symptoms of BV vary for each woman. Some women have few symptoms or none at all. If symptoms are present, they can include:    Thin, milky white or gray or sometimes green discharge    Unpleasant  fishy  odor    Irritation, itching, and burning at opening of vagina which may indicate mixed vaginitis      Burning or irritation with sex or urination which may indicate mixed vaginitis  Diagnosing bacterial vaginosis  Your healthcare provider will ask about your symptoms and health history. He or she will also do a pelvic exam. This is an exam of your vagina and cervix. A sample of vaginal fluid or discharge may be taken. This sample is checked for signs of BV.  Treating bacterial vaginosis  BV is often treated with antibiotics. They may be given in oral pill form or as a vaginal cream. To use these medicines:    Be sure to take all of your medicine, even if your symptoms go away.    If you re taking  antibiotic pills, do not drink alcohol until you re finished with all of your medicine.    If you re using vaginal cream, apply it as directed. Be aware that the cream may make condoms and diaphragms less effective.    Call your healthcare provider if symptoms do not go away within 4 days of starting treatment. Also call if you have a reaction to the medicine.  Why treatment matters  Even if you have no symptoms or your symptoms are mild, BV should be treated. Untreated BV can lead to health problems such as:    Increased risk of  delivery if you re pregnant    Increased risk of complications after surgery on the reproductive organs    Possible increased risk of pelvic inflammatory disease (PID)   Date Last Reviewed: 3/1/2017    8620-5023 [employer]. [address] [place] [address], [place] [address]. All rights reserved. This information is not intended as a substitute for professional medical care. Always follow your healthcare professional's instructions.                Follow-ups after your visit        Who to contact     If you have questions or need follow up information about today's clinic visit or your schedule please contact [facility] directly at [phone].  Normal or non-critical lab and imaging results will be communicated to you by [email or web address], letter or phone within 4 business days after the clinic has received the results. If you do not hear from us within 7 days, please contact the clinic through [facility] or phone. If you have a critical or abnormal lab result, we will notify you by phone as soon as possible.  Submit refill requests through [employer] or call your pharmacy and they will forward the refill request to us. Please allow 3 business days for your refill to be completed.          Additional Information About Your Visit        [employer] Information     [employer] lets you send messages to your doctor, view your test results, renew your prescriptions, schedule  "appointments and more. To sign up, go to [email or web address] . Click on \"Log in\" on the left side of the screen, which will take you to the Welcome page. Then click on \"Sign up Now\" on the right side of the page.     You will be asked to enter the access code listed below, as well as some personal information. Please follow the directions to create your username and password.     Your access code is: [email or web address]  Expires: 2018 11:06 AM     Your access code will  in 90 days. If you need help or a new code, please call your [facility] clinic or [phone].        Care EveryWhere ID     This is your Care EveryWhere ID. This could be used by other organizations to access your [facility] medical records  [id number]        Your Vitals Were     Pulse Temperature Height Last Period BMI (Body Mass Index)       72 98.6  F (37  C) (Tympanic) 5' 3\" (1.6 m) 2013 39.5 kg/m2        Blood Pressure from Last 3 Encounters:   18 140/72   18 110/78   18 118/72    Weight from Last 3 Encounters:   18 223 lb (101.2 kg)   18 223 lb (101.2 kg)   18 221 lb (100.2 kg)              We Performed the Following     *UA reflex to Microscopic and Culture ([place] and [facility] (except [place] and [place])     Wet prep          Today's Medication Changes          These changes are accurate as of 18  2:43 PM.  If you have any questions, ask your nurse or doctor.               Start taking these medicines.        Dose/Directions    metroNIDAZOLE 500 MG tablet   Commonly known as:  FLAGYL   Used for:  Bacterial vaginosis   Started by:  Joy Kam APRN CNP        Dose:  500 mg   Take 1 tablet (500 mg) by mouth 2 times daily   Quantity:  14 tablet   Refills:  0            Where to get your medicines      These medications were sent to [facility] [address] [address]     Phone:  [phone]    "  metroNIDAZOLE 500 MG tablet                Primary Care Provider Office Phone # Fax #    Joy Kam, ROSA [phone] [address] [place] [id number]        Equal Access to Services     MASON VICENTE : Hadii aad ku hadstaceyo Sominhali, waaxda luqadaha, qaybta kaalmada adetonnyda, isidro bernardannette gibsonmirta mack aakash fritz. So [facility] [phone].    ATENCIÓN: Si habla español, tiene a willis disposición servicios gratuitos de asistencia lingüística. Llame al [phone].    We comply with applicable federal civil rights laws and [place] laws. We do not discriminate on the basis of race, color, national origin, age, disability, sex, sexual orientation, or gender identity.            Thank you!     Thank you for choosing [facility]  for your care. Our goal is always to provide you with excellent care. Hearing back from our patients is one way we can continue to improve our services. Please take a few minutes to complete the written survey that you may receive in the mail after your visit with us. Thank you!             Your Updated Medication List - Protect others around you: Learn how to safely use, store and throw away your medicines at www.disposemymeds.org.          This list is accurate as of 4/30/18  2:43 PM.  Always use your most recent med list.                   Brand Name Dispense Instructions for use Diagnosis    allopurinol 100 MG tablet    ZYLOPRIM    90 tablet    TAKE ONE TABLET BY MOUTH EVERY DAY    Gouty arthropathy       levothyroxine 75 MCG tablet    SYNTHROID/LEVOTHROID    90 tablet    Take 1 tablet (75 mcg) by mouth daily    Acquired hypothyroidism       metroNIDAZOLE 500 MG tablet    FLAGYL    14 tablet    Take 1 tablet (500 mg) by mouth 2 times daily    Bacterial vaginosis       multivitamin, therapeutic with minerals Tabs tablet     100 tablet    Take 1 tablet by mouth daily    Anemia, unspecified anemia type       TYLENOL PO      Take 325-650 mg  by mouth every 4 hours as needed for mild pain or fever

## 2018-04-30 NOTE — PROGRESS NOTES
SUBJECTIVE:   Elly Choe is a 82 year old female who presents to clinic today for the following health issues:      URINARY TRACT SYMPTOMS      Duration: 2 weeks     Description  Burning     Intensity:  moderate    Accompanying signs and symptoms:  Fever/chills: no   Flank pain no   Nausea and vomiting: no   Vaginal symptoms: none  Abdominal/Pelvic Pain: no     History  History of frequent UTI's: YES  History of kidney stones: no   Sexually Active: no   Possibility of pregnancy: No    Precipitating or alleviating factors: None    Therapies tried and outcome: none   Outcome: none     Slippery vaginal discharge    Problem list and histories reviewed & adjusted, as indicated.  Additional history: as documented    Patient Active Problem List   Diagnosis     Gastroesophageal reflux disease without esophagitis     Hemangioma     Benign essential hypertension     surgical menopause (SARAH BSO) for non-cancerous reasons     Morbid obesity due to excess calories (H)     Idiopathic chronic gout of multiple sites without tophus     Chronic airway obstruction (H)     Hyperlipidemia LDL goal <100     CKD (chronic kidney disease) stage 3, GFR 30-59 ml/min     Advanced directives, counseling/discussion     Acquired hypothyroidism     DJD of shoulder     Primary localized osteoarthrosis, lower leg     Generalized anxiety disorder     Health Care Home     Coronary artery disease involving native coronary artery of native heart without angina pectoris     FH: rheumatoid arthritis     Osteopenia     Status post total replacement of right hip     Degenerative joint disease (DJD) of hip     Postoperative anemia     Past Surgical History:   Procedure Laterality Date     ARTHROPLASTY HIP Right 1/31/2018    Procedure: ARTHROPLASTY HIP;  Right total hip arthroplasty. ;  Surgeon: Sen Parry MD;  Location: [facility]     ARTHROPLASTY KNEE  4/13/2011    Procedure:ARTHROPLASTY KNEE; Surgeon:SEN PARRY; Location:[facility]      ARTHROPLASTY KNEE  12/26/2012    Procedure: ARTHROPLASTY KNEE;  Left Total Knee Arthroplasty;  Surgeon: Sen Parry MD;  Location: [facility]     ARTHROPLASTY SHOULDER  9/26/2012    Procedure: ARTHROPLASTY SHOULDER;  Right total shoulder arthroplasty;  Surgeon: Sen Parry MD;  Location: [facility]     ARTHROPLASTY SHOULDER Left 5/4/2016    Procedure: ARTHROPLASTY SHOULDER;  Surgeon: Sen Parry MD;  Location: [facility]     ARTHROSCOPY KNEE IRRIGATION AND DEBRIDEMENT  12/10/2010    ARTHROSCOPY KNEE IRRIGATION AND DEBRIDEMENT performed by LEY, JEFFREY DUANE at [facility]     CATARACT IOL, RT/LT  4/2010    bilateral     COLONOSCOPY  2002     COLONOSCOPY  6/19/2014    Procedure: COLONOSCOPY;  Surgeon: Steve Deng MD;  Location: [facility]     CORONARY ANGIOGRAPHY ADULT ORDER       HYSTERECTOMY, SARAH  1976    Total abdominal hysterectomy & bilateral salpingectomy oophorectomy     TONSILLECTOMY & ADENOIDECTOMY      as child       Social History   Substance Use Topics     Smoking status: Former Smoker     Quit date: 1/1/1970     Smokeless tobacco: Never Used      Comment: 20 year hx of smoking 3 packs of cigarettes daily; quit 1970     Alcohol use No     Family History   Problem Relation Age of Onset     Alcohol/Drug Father      DIABETES Brother      DIABETES Brother      Arthritis Mother 20     Other - See Comments Daughter      Fallopian tube infection 06/2015         Current Outpatient Prescriptions   Medication Sig Dispense Refill     Acetaminophen (TYLENOL PO) Take 325-650 mg by mouth every 4 hours as needed for mild pain or fever        allopurinol (ZYLOPRIM) 100 MG tablet TAKE ONE TABLET BY MOUTH EVERY DAY 90 tablet 3     levothyroxine (SYNTHROID/LEVOTHROID) 75 MCG tablet Take 1 tablet (75 mcg) by mouth daily 90 tablet 3     metroNIDAZOLE (FLAGYL) 500 MG tablet Take 1 tablet (500 mg) by mouth 2 times daily 14 tablet 0     multivitamin, therapeutic with minerals (MULTI-VITAMIN) TABS Take 1 tablet by mouth daily 100  "tablet 3     Allergies   Allergen Reactions     Sulfa Drugs Other (See Comments)     Causes burning when urinates. Causes yeast infections.     Labs reviewed in EPIC    Reviewed and updated as needed this visit by clinical staff  Tobacco  Allergies  Meds  Med Hx  Surg Hx  Fam Hx  Soc Hx      Reviewed and updated as needed this visit by Provider         ROS:  Constitutional, HEENT, cardiovascular, pulmonary, gi and gu systems are negative, except as otherwise noted.    OBJECTIVE:     /72  Pulse 72  Temp 98.6  F (37  C) (Tympanic)  Ht 5' 3\" (1.6 m)  Wt 223 lb (101.2 kg)  LMP 01/14/2013  BMI 39.5 kg/m2  Body mass index is 39.5 kg/(m^2).  GENERAL: healthy, alert and no distress  RESP: lungs clear to auscultation - no rales, rhonchi or wheezes  CV: regular rate and rhythm, normal S1 S2, no S3 or S4, no murmur, click or rub  ABDOMEN: soft, nontender, and bowel sounds normal  PSYCH: mentation appears normal, affect normal/bright    Diagnostic Test Results:  Results for orders placed or performed in visit on 04/30/18 (from the past 24 hour(s))   *UA reflex to Microscopic and Culture ([place] and [facility] (except [place] and [place])   Result Value Ref Range    Color Urine Yellow     Appearance Urine Clear     Glucose Urine Negative NEG^Negative mg/dL    Bilirubin Urine Negative NEG^Negative    Ketones Urine Trace (A) NEG^Negative mg/dL    Specific Gravity Urine 1.015 1.003 - 1.035    Blood Urine Negative NEG^Negative    pH Urine 6.5 5.0 - 7.0 pH    Protein Albumin Urine Negative NEG^Negative mg/dL    Urobilinogen Urine 0.2 0.2 - 1.0 EU/dL    Nitrite Urine Negative NEG^Negative    Leukocyte Esterase Urine Negative NEG^Negative    Source Midstream Urine    Wet prep   Result Value Ref Range    Specimen Description Vagina     Wet Prep No yeast seen     Wet Prep Clue cells seen (A)     Wet Prep No Trichomonas seen        ASSESSMENT/PLAN:     1. Bacterial vaginosis  BV on labs.  Flagyl sent to the " pharmacy for symptoms.  Symptomatic care and follow up discussed.  - metroNIDAZOLE (FLAGYL) 500 MG tablet; Take 1 tablet (500 mg) by mouth 2 times daily  Dispense: 14 tablet; Refill: 0    2. Urinary problem    - *UA reflex to Microscopic and Culture ([place] and [facility] (except [place] and [place])    3. Vaginal discharge    - Wet prep    Home care instructions were reviewed with the patient. The risks, benefits and treatment options of prescribed medications or other treatments have been discussed with the patient. The patient verbalized their understanding and should call or follow up if no improvement or if they develop further problems.    Patient Instructions     Flagyl sent to the pharmacy for symptoms.    Symptomatic care and follow up discussed  Vaginal Infection: Bacterial Vaginosis  Both good and bad bacteria are present in a healthy vagina. Bacterial vaginosis (BV) occurs when these bacteria get out of balance. The numbers of good bacteria decrease. This allows the numbers of bad bacteria to increase and cause BV. In most cases, BV is not a serious problem.  Causes of bacterial vaginosis  The cause of BV is not clear. Douching may lead to it. Having sex with a new partner or more than 1 partner makes it more likely.  Symptoms of bacterial vaginosis  Symptoms of BV vary for each woman. Some women have few symptoms or none at all. If symptoms are present, they can include:    Thin, milky white or gray or sometimes green discharge    Unpleasant  fishy  odor    Irritation, itching, and burning at opening of vagina which may indicate mixed vaginitis      Burning or irritation with sex or urination which may indicate mixed vaginitis  Diagnosing bacterial vaginosis  Your healthcare provider will ask about your symptoms and health history. He or she will also do a pelvic exam. This is an exam of your vagina and cervix. A sample of vaginal fluid or discharge may be taken. This sample is checked for  signs of BV.  Treating bacterial vaginosis  BV is often treated with antibiotics. They may be given in oral pill form or as a vaginal cream. To use these medicines:    Be sure to take all of your medicine, even if your symptoms go away.    If you re taking antibiotic pills, do not drink alcohol until you re finished with all of your medicine.    If you re using vaginal cream, apply it as directed. Be aware that the cream may make condoms and diaphragms less effective.    Call your healthcare provider if symptoms do not go away within 4 days of starting treatment. Also call if you have a reaction to the medicine.  Why treatment matters  Even if you have no symptoms or your symptoms are mild, BV should be treated. Untreated BV can lead to health problems such as:    Increased risk of  delivery if you re pregnant    Increased risk of complications after surgery on the reproductive organs    Possible increased risk of pelvic inflammatory disease (PID)   Date Last Reviewed: 3/1/2017    2445-0283 [employer]. [address] [place] [address]. All rights reserved. This information is not intended as a substitute for professional medical care. Always follow your healthcare professional's instructions.            ROSA Patel [facility]

## 2018-05-14 DIAGNOSIS — M10.9 GOUTY ARTHROPATHY: ICD-10-CM

## 2018-05-14 RX ORDER — ALLOPURINOL 100 MG/1
TABLET ORAL
Qty: 90 TABLET | Refills: 1 | Status: SHIPPED | OUTPATIENT
Start: 2018-05-14

## 2018-05-14 NOTE — TELEPHONE ENCOUNTER
"Requested Prescriptions   Pending Prescriptions Disp Refills     allopurinol (ZYLOPRIM) 100 MG tablet [Pharmacy Med Name: ALLOPURINOL 100MG TABS] 90 tablet 3     Sig: TAKE ONE TABLET BY MOUTH EVERY DAY    Gout Agents Protocol Failed    5/14/2018 10:17 AM       Failed - Uric acid greater than or equal to 6 on file in past 12 months    Recent Labs   Lab Test  05/02/16   1451   URIC  5.3     If level is 6mg/dL or greater, ok to refill one time and refer to provider.          Failed - Normal serum creatinine on file in the past 12 months    Recent Labs   Lab Test  02/27/18   1410   CR  1.12*            Passed - CBC on file in past 12 months    Recent Labs   Lab Test  02/27/18   1410   WBC  7.3   RBC  3.76*   HGB  12.1   HCT  35.2   PLT  210            Passed - ALT on file in past 12 months    Recent Labs   Lab Test  02/27/18   1410   ALT  16            Passed - Recent (12 mo) or future (30 days) visit within the authorizing provider's specialty    Patient had office visit in the last 12 months or has a visit in the next 30 days with authorizing provider or within the authorizing provider's specialty.  See \"Patient Info\" tab in inbasket, or \"Choose Columns\" in Meds & Orders section of the refill encounter.           Passed - Patient is age 18 or older       Passed - No active pregnancy on record       Passed - No positive pregnancy test in past year        allopurinol (ZYLOPRIM) 100 MG tablet  Last Written Prescription Date:  12/14/2017  Last Fill Quantity: 90 tablet,  # refills: 3   Last office visit: 4/30/2018 with prescribing provider:  HOME Kam   Future Office Visit:      Gregoria Martinez RT (R) (M)    "

## 2018-05-21 ENCOUNTER — OFFICE VISIT (OUTPATIENT)
Dept: FAMILY MEDICINE | Facility: CLINIC | Age: 83
End: 2018-05-21
Payer: COMMERCIAL

## 2018-05-21 VITALS
OXYGEN SATURATION: 98 % | BODY MASS INDEX: 40.75 KG/M2 | SYSTOLIC BLOOD PRESSURE: 132 MMHG | HEART RATE: 74 BPM | TEMPERATURE: 98.1 F | WEIGHT: 230 LBS | HEIGHT: 63 IN | DIASTOLIC BLOOD PRESSURE: 84 MMHG

## 2018-05-21 DIAGNOSIS — L30.9 DERMATITIS: Primary | ICD-10-CM

## 2018-05-21 PROCEDURE — 99213 OFFICE O/P EST LOW 20 MIN: CPT | Performed by: NURSE PRACTITIONER

## 2018-05-21 RX ORDER — PREDNISONE 20 MG/1
20 TABLET ORAL 2 TIMES DAILY
Qty: 10 TABLET | Refills: 0 | Status: SHIPPED | OUTPATIENT
Start: 2018-05-21

## 2018-05-21 NOTE — MR AVS SNAPSHOT
After Visit Summary   5/21/2018    Elly Choe    MRN: [id number]           Patient Information     Date Of Birth          1935        Visit Information        Provider Department      5/21/2018 1:40 PM Joy Kam APRN [facility]        Today's Diagnoses     Dermatitis    -  1      Care Instructions    Prednisone sent to the pharmacy for symptoms    Follow up if symptoms do not improve or worsen.    Erythema  Erythema means a reddening of the skin. If the condition is just in one area of your body, it can mean that you have inflammation, irritation, or infection of the skin. Erythema over a joint can be a sign of joint infection. When erythema is spread over most of your body, like a rash, it is usually a sign of a more general problem. This could be an allergic reaction, viral or bacterial infection, or an immune system disease.  The cause of your condition is not clear. It may be hard to diagnose the exact cause of an illness in its early stages. More time may be needed before doctors can make a diagnosis.  Home care  Follow these guidelines when caring for yourself at home:    Watch for any new symptoms. Tell your healthcare provider about any that show up.    You may use acetaminophen or ibuprofen to control pain, unless another medicine was prescribed. If you have chronic liver or kidney disease, talk with your provider before using these medicines. Also talk with your provider if you ve had a stomach ulcer or gastrointestinal bleeding. Don t give aspirin to anyone under 18 years of age who is ill with a fever.    Have anyone who touches your skin wash his or her hands with soap and water.    Don t share towels or clothes.    Keep the affected area clean and dry. Raising the affected area above the level of your heart may help ease swelling.  Follow-up care  Follow up with your healthcare provider, or as advised.  When to seek medical  "advice  Call your healthcare provider right away if any of these occur:    The redness does not go away within 2 to 3 days    Pain or redness that gets worse    Fluid or pus drains from the reddened area    New joint pain    New rash    Fever of 100.4 F (38 C) or higher, or as directed by your healthcare provider    Severe headache, neck pain, drowsiness, or confusion    Weakness, dizziness, repeated vomiting, or diarrhea   Date Last Reviewed: 9/1/2016 2000-2017 [employer]. [address] [place] [address]. All rights reserved. This information is not intended as a substitute for professional medical care. Always follow your healthcare professional's instructions.                Follow-ups after your visit        Who to contact     If you have questions or need follow up information about today's clinic visit or your schedule please contact [facility] directly at [phone].  Normal or non-critical lab and imaging results will be communicated to you by MyChart, letter or phone within 4 business days after the clinic has received the results. If you do not hear from us within 7 days, please contact the clinic through [employer] or phone. If you have a critical or abnormal lab result, we will notify you by phone as soon as possible.  Submit refill requests through [employer] or call your pharmacy and they will forward the refill request to us. Please allow 3 business days for your refill to be completed.          Additional Information About Your Visit        [employer] Information     [employer] lets you send messages to your doctor, view your test results, renew your prescriptions, schedule appointments and more. To sign up, go to [email or web address] . Click on \"Log in\" on the left side of the screen, which will take you to the Welcome page. Then click on \"Sign up Now\" on the right side of the page.     You will be asked to enter the access code listed below, as well as some " "personal information. Please follow the directions to create your username and password.     Your access code is: [email or web address]  Expires: 2018 11:06 AM     Your access code will  in 90 days. If you need help or a new code, please call your [facility] clinic or [phone].        Care EveryWhere ID     This is your Care EveryWhere ID. This could be used by other organizations to access your [facility] medical records  [id number]        Your Vitals Were     Pulse Temperature Height Last Period Pulse Oximetry BMI (Body Mass Index)    74 98.1  F (36.7  C) (Tympanic) 5' 3\" (1.6 m) 2013 98% 40.74 kg/m2       Blood Pressure from Last 3 Encounters:   18 132/84   18 140/72   18 110/78    Weight from Last 3 Encounters:   18 230 lb (104.3 kg)   18 223 lb (101.2 kg)   18 223 lb (101.2 kg)              Today, you had the following     No orders found for display         Today's Medication Changes          These changes are accurate as of 18  2:01 PM.  If you have any questions, ask your nurse or doctor.               Start taking these medicines.        Dose/Directions    predniSONE 20 MG tablet   Commonly known as:  DELTASONE   Used for:  Dermatitis   Started by:  Joy Kam APRN CNP        Dose:  20 mg   Take 1 tablet (20 mg) by mouth 2 times daily   Quantity:  10 tablet   Refills:  0            Where to get your medicines      These medications were sent to [facility] [place] [address]     Phone:  [phone]     predniSONE 20 MG tablet                Primary Care Provider Office Phone # Fax #    ROSA Steven [phone]       [address] [place], [place] [address]        Equal Access to Services     MASON VICENTE AH: Gianfranco araizao Soalbina, waaxda luqadaha, qaybta kaalmada jose de jesusyakurt, isidro firtz. So [facility] " [phone].    ATENCIÓN: Si latrell perkins, tiene a willis disposición servicios gratuitos de asistencia lingüística. Josue truong [phone].    We comply with applicable federal civil rights laws and [place] laws. We do not discriminate on the basis of race, color, national origin, age, disability, sex, sexual orientation, or gender identity.            Thank you!     Thank you for choosing [facility]  for your care. Our goal is always to provide you with excellent care. Hearing back from our patients is one way we can continue to improve our services. Please take a few minutes to complete the written survey that you may receive in the mail after your visit with us. Thank you!             Your Updated Medication List - Protect others around you: Learn how to safely use, store and throw away your medicines at www.disposemymeds.org.          This list is accurate as of 5/21/18  2:01 PM.  Always use your most recent med list.                   Brand Name Dispense Instructions for use Diagnosis    * allopurinol 100 MG tablet    ZYLOPRIM    90 tablet    TAKE ONE TABLET BY MOUTH EVERY DAY    Gouty arthropathy       * allopurinol 100 MG tablet    ZYLOPRIM    90 tablet    TAKE ONE TABLET BY MOUTH EVERY DAY    Gouty arthropathy       levothyroxine 75 MCG tablet    SYNTHROID/LEVOTHROID    90 tablet    Take 1 tablet (75 mcg) by mouth daily    Acquired hypothyroidism       metroNIDAZOLE 500 MG tablet    FLAGYL    14 tablet    Take 1 tablet (500 mg) by mouth 2 times daily    Bacterial vaginosis       multivitamin, therapeutic with minerals Tabs tablet     100 tablet    Take 1 tablet by mouth daily    Anemia, unspecified anemia type       predniSONE 20 MG tablet    DELTASONE    10 tablet    Take 1 tablet (20 mg) by mouth 2 times daily    Dermatitis       TYLENOL PO      Take 325-650 mg by mouth every 4 hours as needed for mild pain or fever        * Notice:  This list has 2 medication(s) that are the same as  other medications prescribed for you. Read the directions carefully, and ask your doctor or other care provider to review them with you.

## 2018-05-21 NOTE — PROGRESS NOTES
SUBJECTIVE:   Elly Choe is a 82 year old female who presents to clinic today for the following health issues:    Rash      Duration: Yesterday     Description  Location: stomach/ legs   Itching: moderate    Intensity:  moderate    Accompanying signs and symptoms: warm to touch     History (similar episodes/previous evaluation): None    Precipitating or alleviating factors:  New exposures:  None  Recent travel: no      Therapies tried and outcome: vinegar     No fever, chills or body aches  No new products or medication  Significant itching present    Problem list and histories reviewed & adjusted, as indicated.  Additional history: as documented    Patient Active Problem List   Diagnosis     Gastroesophageal reflux disease without esophagitis     Hemangioma     Benign essential hypertension     surgical menopause (SARAH BSO) for non-cancerous reasons     Morbid obesity due to excess calories (H)     Idiopathic chronic gout of multiple sites without tophus     Chronic airway obstruction (H)     Hyperlipidemia LDL goal <100     CKD (chronic kidney disease) stage 3, GFR 30-59 ml/min     Advanced directives, counseling/discussion     Acquired hypothyroidism     DJD of shoulder     Primary localized osteoarthrosis, lower leg     Generalized anxiety disorder     Health Care Home     Coronary artery disease involving native coronary artery of native heart without angina pectoris     FH: rheumatoid arthritis     Osteopenia     Status post total replacement of right hip     Degenerative joint disease (DJD) of hip     Postoperative anemia     Past Surgical History:   Procedure Laterality Date     ARTHROPLASTY HIP Right 1/31/2018    Procedure: ARTHROPLASTY HIP;  Right total hip arthroplasty. ;  Surgeon: Sen Parry MD;  Location: [facility]     ARTHROPLASTY KNEE  4/13/2011    Procedure:ARTHROPLASTY KNEE; Surgeon:SEN PARRY; Location:[facility]     ARTHROPLASTY KNEE  12/26/2012    Procedure: ARTHROPLASTY KNEE;   Left Total Knee Arthroplasty;  Surgeon: Sen Parry MD;  Location: [facility]     ARTHROPLASTY SHOULDER  9/26/2012    Procedure: ARTHROPLASTY SHOULDER;  Right total shoulder arthroplasty;  Surgeon: Sen Parry MD;  Location: [facility]     ARTHROPLASTY SHOULDER Left 5/4/2016    Procedure: ARTHROPLASTY SHOULDER;  Surgeon: Sen Parry MD;  Location: [facility]     ARTHROSCOPY KNEE IRRIGATION AND DEBRIDEMENT  12/10/2010    ARTHROSCOPY KNEE IRRIGATION AND DEBRIDEMENT performed by LEY, JEFFREY DUANE at [facility]     CATARACT IOL, RT/LT  4/2010    bilateral     COLONOSCOPY  2002     COLONOSCOPY  6/19/2014    Procedure: COLONOSCOPY;  Surgeon: Steve Deng MD;  Location: [facility]     CORONARY ANGIOGRAPHY ADULT ORDER       HYSTERECTOMY, SARAH  1976    Total abdominal hysterectomy & bilateral salpingectomy oophorectomy     TONSILLECTOMY & ADENOIDECTOMY      as child       Social History   Substance Use Topics     Smoking status: Former Smoker     Quit date: 1/1/1970     Smokeless tobacco: Never Used      Comment: 20 year hx of smoking 3 packs of cigarettes daily; quit 1970     Alcohol use No     Family History   Problem Relation Age of Onset     Alcohol/Drug Father      DIABETES Brother      DIABETES Brother      Arthritis Mother 20     Other - See Comments Daughter      Fallopian tube infection 06/2015         Current Outpatient Prescriptions   Medication Sig Dispense Refill     Acetaminophen (TYLENOL PO) Take 325-650 mg by mouth every 4 hours as needed for mild pain or fever        allopurinol (ZYLOPRIM) 100 MG tablet TAKE ONE TABLET BY MOUTH EVERY DAY 90 tablet 3     allopurinol (ZYLOPRIM) 100 MG tablet TAKE ONE TABLET BY MOUTH EVERY DAY 90 tablet 1     levothyroxine (SYNTHROID/LEVOTHROID) 75 MCG tablet Take 1 tablet (75 mcg) by mouth daily 90 tablet 3     metroNIDAZOLE (FLAGYL) 500 MG tablet Take 1 tablet (500 mg) by mouth 2 times daily 14 tablet 0     multivitamin, therapeutic with minerals (MULTI-VITAMIN) TABS Take 1  "tablet by mouth daily 100 tablet 3     predniSONE (DELTASONE) 20 MG tablet Take 1 tablet (20 mg) by mouth 2 times daily 10 tablet 0     Allergies   Allergen Reactions     Sulfa Drugs Other (See Comments)     Causes burning when urinates. Causes yeast infections.     Labs reviewed in EPIC    Reviewed and updated as needed this visit by clinical staff       Reviewed and updated as needed this visit by Provider         ROS:  Constitutional, HEENT, cardiovascular, pulmonary, gi and gu systems are negative, except as otherwise noted.    OBJECTIVE:     /84 (Cuff Size: Adult Small)  Pulse 74  Temp 98.1  F (36.7  C) (Tympanic)  Ht 5' 3\" (1.6 m)  Wt 230 lb (104.3 kg)  LMP 01/14/2013  SpO2 98%  BMI 40.74 kg/m2  Body mass index is 40.74 kg/(m^2).  GENERAL: healthy, alert and no distress  SKIN: erythematous patches on bilateral upper extremities, bilateral LE, abdomen and right breast  NEURO: Normal strength and tone, mentation intact and speech normal  PSYCH: mentation appears normal, affect normal/bright    Diagnostic Test Results:  none     ASSESSMENT/PLAN:     1. Dermatitis  Prednisone sent to the pharmacy for symptoms.  Follow up if not improving or worsening symptoms.  Symptomatic care and follow up discussed.  - predniSONE (DELTASONE) 20 MG tablet; Take 1 tablet (20 mg) by mouth 2 times daily  Dispense: 10 tablet; Refill: 0    Home care instructions were reviewed with the patient. The risks, benefits and treatment options of prescribed medications or other treatments have been discussed with the patient. The patient verbalized their understanding and should call or follow up if no improvement or if they develop further problems.    Patient Instructions   Prednisone sent to the pharmacy for symptoms    Follow up if symptoms do not improve or worsen.    Erythema  Erythema means a reddening of the skin. If the condition is just in one area of your body, it can mean that you have inflammation, irritation, or " infection of the skin. Erythema over a joint can be a sign of joint infection. When erythema is spread over most of your body, like a rash, it is usually a sign of a more general problem. This could be an allergic reaction, viral or bacterial infection, or an immune system disease.  The cause of your condition is not clear. It may be hard to diagnose the exact cause of an illness in its early stages. More time may be needed before doctors can make a diagnosis.  Home care  Follow these guidelines when caring for yourself at home:    Watch for any new symptoms. Tell your healthcare provider about any that show up.    You may use acetaminophen or ibuprofen to control pain, unless another medicine was prescribed. If you have chronic liver or kidney disease, talk with your provider before using these medicines. Also talk with your provider if you ve had a stomach ulcer or gastrointestinal bleeding. Don t give aspirin to anyone under 18 years of age who is ill with a fever.    Have anyone who touches your skin wash his or her hands with soap and water.    Don t share towels or clothes.    Keep the affected area clean and dry. Raising the affected area above the level of your heart may help ease swelling.  Follow-up care  Follow up with your healthcare provider, or as advised.  When to seek medical advice  Call your healthcare provider right away if any of these occur:    The redness does not go away within 2 to 3 days    Pain or redness that gets worse    Fluid or pus drains from the reddened area    New joint pain    New rash    Fever of 100.4 F (38 C) or higher, or as directed by your healthcare provider    Severe headache, neck pain, drowsiness, or confusion    Weakness, dizziness, repeated vomiting, or diarrhea   Date Last Reviewed: 9/1/2016 2000-2017 [employer]. [address] [place] [address], [place] [address]. All rights reserved. This information is not intended as a substitute for professional medical  care. Always follow your healthcare professional's instructions.            ROSA Patel [facility]

## 2018-05-21 NOTE — PATIENT INSTRUCTIONS
Prednisone sent to the pharmacy for symptoms    Follow up if symptoms do not improve or worsen.    Erythema  Erythema means a reddening of the skin. If the condition is just in one area of your body, it can mean that you have inflammation, irritation, or infection of the skin. Erythema over a joint can be a sign of joint infection. When erythema is spread over most of your body, like a rash, it is usually a sign of a more general problem. This could be an allergic reaction, viral or bacterial infection, or an immune system disease.  The cause of your condition is not clear. It may be hard to diagnose the exact cause of an illness in its early stages. More time may be needed before doctors can make a diagnosis.  Home care  Follow these guidelines when caring for yourself at home:    Watch for any new symptoms. Tell your healthcare provider about any that show up.    You may use acetaminophen or ibuprofen to control pain, unless another medicine was prescribed. If you have chronic liver or kidney disease, talk with your provider before using these medicines. Also talk with your provider if you ve had a stomach ulcer or gastrointestinal bleeding. Don t give aspirin to anyone under 18 years of age who is ill with a fever.    Have anyone who touches your skin wash his or her hands with soap and water.    Don t share towels or clothes.    Keep the affected area clean and dry. Raising the affected area above the level of your heart may help ease swelling.  Follow-up care  Follow up with your healthcare provider, or as advised.  When to seek medical advice  Call your healthcare provider right away if any of these occur:    The redness does not go away within 2 to 3 days    Pain or redness that gets worse    Fluid or pus drains from the reddened area    New joint pain    New rash    Fever of 100.4 F (38 C) or higher, or as directed by your healthcare provider    Severe headache, neck pain, drowsiness, or  confusion    Weakness, dizziness, repeated vomiting, or diarrhea   Date Last Reviewed: 9/1/2016 2000-2017 [employer]. [address] [place] [address], [place] [address]. All rights reserved. This information is not intended as a substitute for professional medical care. Always follow your healthcare professional's instructions.

## 2018-06-15 ENCOUNTER — TELEPHONE (OUTPATIENT)
Dept: FAMILY MEDICINE | Facility: CLINIC | Age: 83
End: 2018-06-15

## 2018-06-15 DIAGNOSIS — E03.9 ACQUIRED HYPOTHYROIDISM: Chronic | ICD-10-CM

## 2018-06-15 RX ORDER — LEVOTHYROXINE SODIUM 75 UG/1
75 TABLET ORAL DAILY
Qty: 30 TABLET | Refills: 0 | Status: SHIPPED | OUTPATIENT
Start: 2018-06-15

## 2018-06-15 NOTE — TELEPHONE ENCOUNTER
Medication is being filled for 1 time refill only due to:  Patient needs to be seen because due for medication review.  Reviewed TSH lab.   SONG Rios

## 2018-06-15 NOTE — TELEPHONE ENCOUNTER
Aurora from [employer]. Pt moved to [facility] and does not have a doctor yet, she needs a refill, she is out of her thyroid med. Pt got online to verify med needed. She will be establishing care there soon.  Melinda Ulrich  CSS

## 2018-07-15 ENCOUNTER — PATIENT OUTREACH (OUTPATIENT)
Dept: CARE COORDINATION | Facility: CLINIC | Age: 83
End: 2018-07-15

## 2018-07-15 NOTE — PROGRESS NOTES
Clinic Care Coordination Contact    Situation: Patient chart reviewed by care sterling.    Background: Follow up to how patient doing.    Assessment: Per chart review via telephone encounter 6/15/18 refill request, patient requesting refill on her Levothyroxine. She states she has moved to [place] with family but has not found a PCP yet. A one time refill given, patient to establish care with provider in [place] for further medication refills.    Plan/Recommendations: No further Care Coordination needs identified at this time. Patient may be referred to Care Coordination in the future if additional needs arise or moves back to [place].  Pt encouraged to contact Care Coordinator through the clinic if situation changes and assistance is needed. No follow-up planned.      Ambreen Bah RN, [facility]  RN Care Coordinator  [facility], [facility]  Phone # [phone]  7/15/2018 12:02 PM

## 2022-08-23 NOTE — MR AVS SNAPSHOT
After Visit Summary   12/19/2017    Elly Choe    MRN: [id number]           Patient Information     Date Of Birth          1935        Visit Information        Provider Department      12/19/2017 10:40 AM Joy Kam APRN [facility]        Today's Diagnoses     Preop general physical exam    -  1    CKD (chronic kidney disease) stage 3, GFR 30-59 ml/min        Acquired hypothyroidism        Coronary artery disease involving native coronary artery of native heart without angina pectoris        Benign essential hypertension        Morbid obesity due to excess calories (H)        Chronic obstructive pulmonary disease, unspecified COPD type (H)        Urinary problem          Care Instructions    Hold your glucosamine and Aspirin 1 week prior to surgery  Before Your Surgery      Call your surgeon if there is any change in your health. This includes signs of a cold or flu (such as a sore throat, runny nose, cough, rash or fever).    Do not smoke, drink alcohol or take over the counter medicine (unless your surgeon or primary care doctor tells you to) for the 24 hours before and after surgery.    If you take prescribed drugs: Follow your doctor s orders about which medicines to take and which to stop until after surgery.    Eating and drinking prior to surgery: follow the instructions from your surgeon    Take a shower or bath the night before surgery. Use the soap your surgeon gave you to gently clean your skin. If you do not have soap from your surgeon, use your regular soap. Do not shave or scrub the surgery site.  Wear clean pajamas and have clean sheets on your bed.           Follow-ups after your visit        Your next 10 appointments already scheduled     Edgar 10, 2018   Procedure with Sen Parry MD   [facility] (--)    [address] [place] [id number] [phone]           [facility] is located at [address]  Final Anesthesia Post-op Assessment    Patient: Bebeto Lo  Procedure(s) Performed: TRANSCATHETER AORTIC VALVE REPLACEMENT (TAVR) ILIOFEMORAL-CV; REPLACEMENT, AORTIC VALVE, TRANSCATHETER, FEMORAL APPROACH  Anesthesia type: MAC    Vitals Value Taken Time   Temp 36 08/23/22 1347   Pulse 59 08/23/22 1335   Resp 15 08/23/22 1335   SpO2 97 % 08/23/22 1335   /72 08/23/22 1335         Patient Location: PACU Phase 1  Post-op Vital Signs:stable  Level of Consciousness: awake and alert  Respiratory Status: spontaneous ventilation  Cardiovascular stable  Hydration: euvolemic  Pain Management: adequately controlled  Handoff: Handoff to receiving clinician was performed and questions were answered  Vomiting: none  Nausea: None  Airway Patency:patent  Post-op Assessment: no complications and patient tolerated procedure well with no complications      There were no known complications for this encounter.    "[facility]. (between [address] and [address] in [place], [place] north of [place]).              Who to contact     If you have questions or need follow up information about today's clinic visit or your schedule please contact [facility] directly at [phone].  Normal or non-critical lab and imaging results will be communicated to you by MyChart, letter or phone within 4 business days after the clinic has received the results. If you do not hear from us within 7 days, please contact the clinic through MyChart or phone. If you have a critical or abnormal lab result, we will notify you by phone as soon as possible.  Submit refill requests through [employer] or call your pharmacy and they will forward the refill request to us. Please allow 3 business days for your refill to be completed.          Additional Information About Your Visit        MyChart Information     [employer] lets you send messages to your doctor, view your test results, renew your prescriptions, schedule appointments and more. To sign up, go to [email or web address] . Click on \"Log in\" on the left side of the screen, which will take you to the Welcome page. Then click on \"Sign up Now\" on the right side of the page.     You will be asked to enter the access code listed below, as well as some personal information. Please follow the directions to create your username and password.     Your access code is: [email or web address]  Expires: 2017 11:27 AM     Your access code will  in 90 days. If you need help or a new code, please call your [facility] or [phone].        Care EveryWhere ID     This is your Care EveryWhere ID. This could be used by other organizations to access your [facility] medical records  [id number]        Your Vitals Were     Pulse Temperature Height Last Period Pulse Oximetry BMI (Body Mass Index)    67 98.4  F (36.9  C) (Tympanic) 5' 3\" (1.6 m) 1977 97% 41.1 kg/m2       Blood Pressure from " Last 3 Encounters:   12/19/17 112/62   11/03/17 118/70   10/27/17 134/72    Weight from Last 3 Encounters:   12/19/17 232 lb (105.2 kg)   11/03/17 231 lb (104.8 kg)   07/26/17 223 lb (101.2 kg)              We Performed the Following     *UA reflex to Microscopic and Culture ([place] and [facility] (except [place] and [place])     CBC with platelets     Comprehensive metabolic panel     EKG 12-lead complete w/read - Clinics     Methicillin resistant staph aureus cult     TSH with free T4 reflex          Today's Medication Changes          These changes are accurate as of: 12/19/17 11:39 AM.  If you have any questions, ask your nurse or doctor.               Stop taking these medicines if you haven't already. Please contact your care team if you have questions.     ciprofloxacin 250 MG tablet   Commonly known as:  CIPRO   Stopped by:  Joy Kam APRN CNP                    Primary Care Provider Office Phone # Fax #    Mirela Madelin, [facility] [phone]       [facility] [address] [place] [address]        Equal Access to Services     [facility] : Hadii wendy ku hadasho Soomaali, waaxda luqadaha, qaybta kaalmada ademirtayada, isidro finn . So [facility] [phone].    ATENCIÓN: Si habla español, tiene a willis disposición servicios gratuitos de asistencia lingüística. Llame al [phone].    We comply with applicable federal civil rights laws and [place] laws. We do not discriminate on the basis of race, color, national origin, age, disability, sex, sexual orientation, or gender identity.            Thank you!     Thank you for choosing [facility]  for your care. Our goal is always to provide you with excellent care. Hearing back from our patients is one way we can continue to improve our services. Please take a few minutes to complete the written survey that you may receive in the mail after your visit with us. Thank  you!             Your Updated Medication List - Protect others around you: Learn how to safely use, store and throw away your medicines at www.disposemymeds.org.          This list is accurate as of: 12/19/17 11:39 AM.  Always use your most recent med list.                   Brand Name Dispense Instructions for use Diagnosis    allopurinol 100 MG tablet    ZYLOPRIM    90 tablet    TAKE ONE TABLET BY MOUTH EVERY DAY    Gouty arthropathy       aspirin 81 MG EC tablet      Take 1 tablet (81 mg) by mouth daily    CAD (coronary artery disease)       GLUCOSAMINE-CHONDROITIN PO      Take 1 tablet by mouth 2 times daily        levothyroxine 75 MCG tablet    SYNTHROID/LEVOTHROID    90 tablet    Take 1 tablet (75 mcg) by mouth daily    Acquired hypothyroidism       multivitamin, therapeutic with minerals Tabs tablet     100 tablet    Take 1 tablet by mouth daily    Anemia, unspecified anemia type       TYLENOL PO      Take 325 mg by mouth every 4 hours as needed for mild pain or fever

## (undated) DEVICE — GOWN IMPERVIOUS SPECIALTY XLG/XLONG 32474

## (undated) DEVICE — GLOVE PROTEXIS BLUE W/NEU-THERA 7.5  2D73EB75

## (undated) DEVICE — BLADE SAW SAGITTAL STRK 19.5X90X1.27MM 2108-109-000S11

## (undated) DEVICE — SU VICRYL 2-0 CT-1 36" UND J945H

## (undated) DEVICE — CATH TRAY FOLEY 16FR SILICONE 907416

## (undated) DEVICE — PACK TOTAL HIP W/POUCH RIVERSIDE LATEX FREE

## (undated) DEVICE — GOWN XLG DISP 9545

## (undated) DEVICE — BLADE KNIFE SURG 15 371115

## (undated) DEVICE — SU VICRYL 1 CT-1 36" UND J947H

## (undated) DEVICE — SYR 50ML LL W/O NDL 309653

## (undated) DEVICE — SOL WATER IRRIG 1000ML BOTTLE 07139-09

## (undated) DEVICE — HOOD T4 PROTECTIVE STERI FACE SHIELD 400-800

## (undated) DEVICE — DRAPE SHEET REV FOLD 3/4 9349

## (undated) DEVICE — SU FIBERWIRE 2 38" T-8 NDL  AR-7206

## (undated) DEVICE — GLOVE PROTEXIS BLUE W/NEU-THERA 8.5  2D73EB85

## (undated) DEVICE — ESU ELEC BLADE 4" COATED

## (undated) DEVICE — IMM PILLOW ABDUCT HIP MED 0814-8033

## (undated) DEVICE — SU DERMABOND PRINEO CLR602US

## (undated) DEVICE — PREP DURAPREP 26ML APL 8630

## (undated) DEVICE — KIT DRAIN CLOSED WOUND SUCTION MED 400ML RESVR

## (undated) DEVICE — SU ETHIBOND 1 CT-1 30" X425H

## (undated) DEVICE — NDL 18GA 1.5" 305196

## (undated) DEVICE — GLOVE PROTEXIS W/NEU-THERA 7.5  2D73TE75

## (undated) DEVICE — GLOVE PROTEXIS W/NEU-THERA 8.0  2D73TE80

## (undated) DEVICE — SOL NACL 0.9% IRRIG 1000ML BOTTLE 07138-09

## (undated) DEVICE — SU WND CLOSURE VLOC 90 ABS 3-0 18" P-14 VLOCM0124

## (undated) DEVICE — DEVICE RETRIEVER HEWSON 71111579

## (undated) DEVICE — SU STRATAFIX PDS PLUS 1 CT-1 18" SXPP1A404

## (undated) DEVICE — SUCTION IRR SYSTEM W/O TIP INTERPULSE HANDPIECE 0210-100-000

## (undated) DEVICE — SPONGE LAP 18X18" X8435

## (undated) DEVICE — BONE CLEANING TIP INTERPULSE  0210-010-000

## (undated) RX ORDER — FENTANYL CITRATE 50 UG/ML
INJECTION, SOLUTION INTRAMUSCULAR; INTRAVENOUS
Status: DISPENSED
Start: 2018-01-31

## (undated) RX ORDER — PHENYLEPHRINE HCL IN 0.9% NACL 1 MG/10 ML
SYRINGE (ML) INTRAVENOUS
Status: DISPENSED
Start: 2018-01-31

## (undated) RX ORDER — EPHEDRINE SULFATE 50 MG/ML
INJECTION, SOLUTION INTRAVENOUS
Status: DISPENSED
Start: 2018-01-31

## (undated) RX ORDER — ACETAMINOPHEN 500 MG
TABLET ORAL
Status: DISPENSED
Start: 2018-01-31

## (undated) RX ORDER — GABAPENTIN 100 MG/1
CAPSULE ORAL
Status: DISPENSED
Start: 2018-01-31